# Patient Record
Sex: FEMALE | Race: WHITE | Employment: OTHER | URBAN - NONMETROPOLITAN AREA
[De-identification: names, ages, dates, MRNs, and addresses within clinical notes are randomized per-mention and may not be internally consistent; named-entity substitution may affect disease eponyms.]

---

## 2017-01-17 ENCOUNTER — TELEPHONE (OUTPATIENT)
Dept: PRIMARY CARE CLINIC | Age: 52
End: 2017-01-17

## 2017-01-19 ENCOUNTER — OFFICE VISIT (OUTPATIENT)
Dept: PRIMARY CARE CLINIC | Age: 52
End: 2017-01-19
Payer: COMMERCIAL

## 2017-01-19 VITALS
RESPIRATION RATE: 20 BRPM | HEART RATE: 99 BPM | DIASTOLIC BLOOD PRESSURE: 79 MMHG | HEIGHT: 64 IN | OXYGEN SATURATION: 98 % | SYSTOLIC BLOOD PRESSURE: 127 MMHG | WEIGHT: 161.2 LBS | BODY MASS INDEX: 27.52 KG/M2 | TEMPERATURE: 98 F

## 2017-01-19 DIAGNOSIS — R05.9 COUGH: ICD-10-CM

## 2017-01-19 DIAGNOSIS — F41.9 ANXIETY: ICD-10-CM

## 2017-01-19 DIAGNOSIS — K92.1 BLOODY STOOL: Primary | ICD-10-CM

## 2017-01-19 DIAGNOSIS — R06.2 WHEEZING: ICD-10-CM

## 2017-01-19 DIAGNOSIS — K62.5 RECTAL BLEEDING: ICD-10-CM

## 2017-01-19 DIAGNOSIS — Z12.11 SCREENING FOR COLON CANCER: Primary | ICD-10-CM

## 2017-01-19 PROCEDURE — 90471 IMMUNIZATION ADMIN: CPT | Performed by: NURSE PRACTITIONER

## 2017-01-19 PROCEDURE — 99214 OFFICE O/P EST MOD 30 MIN: CPT | Performed by: NURSE PRACTITIONER

## 2017-01-19 PROCEDURE — 90686 IIV4 VACC NO PRSV 0.5 ML IM: CPT | Performed by: NURSE PRACTITIONER

## 2017-01-19 RX ORDER — GABAPENTIN 600 MG/1
600 TABLET ORAL 3 TIMES DAILY
COMMUNITY
End: 2017-06-02 | Stop reason: SDUPTHER

## 2017-01-19 RX ORDER — PROMETHAZINE HYDROCHLORIDE 6.25 MG/5ML
6.25 SYRUP ORAL 4 TIMES DAILY PRN
Qty: 1 BOTTLE | Refills: 1 | Status: SHIPPED | OUTPATIENT
Start: 2017-01-19 | End: 2017-01-26

## 2017-01-29 ASSESSMENT — ENCOUNTER SYMPTOMS
SINUS PRESSURE: 1
CHEST TIGHTNESS: 0
BLOOD IN STOOL: 1
WHEEZING: 1
RHINORRHEA: 1
BACK PAIN: 1
APNEA: 0
COUGH: 1
DIARRHEA: 1

## 2017-01-31 ENCOUNTER — OFFICE VISIT (OUTPATIENT)
Dept: GASTROENTEROLOGY | Age: 52
End: 2017-01-31
Payer: COMMERCIAL

## 2017-01-31 VITALS
WEIGHT: 164 LBS | HEART RATE: 68 BPM | OXYGEN SATURATION: 90 % | DIASTOLIC BLOOD PRESSURE: 80 MMHG | HEIGHT: 63 IN | BODY MASS INDEX: 29.06 KG/M2 | SYSTOLIC BLOOD PRESSURE: 126 MMHG

## 2017-01-31 DIAGNOSIS — K59.09 CHRONIC CONSTIPATION: ICD-10-CM

## 2017-01-31 DIAGNOSIS — K64.8 INTERNAL HEMORRHOID, BLEEDING: ICD-10-CM

## 2017-01-31 DIAGNOSIS — K59.00 DIFFICULT BOWEL MOVEMENTS: ICD-10-CM

## 2017-01-31 DIAGNOSIS — R10.9 ABDOMINAL CRAMPING: ICD-10-CM

## 2017-01-31 DIAGNOSIS — K62.89 RECTAL PAIN: ICD-10-CM

## 2017-01-31 DIAGNOSIS — K62.5 RECTAL BLEEDING: Primary | ICD-10-CM

## 2017-01-31 PROCEDURE — 99214 OFFICE O/P EST MOD 30 MIN: CPT | Performed by: NURSE PRACTITIONER

## 2017-01-31 RX ORDER — LACTULOSE 10 G/15ML
10 SOLUTION ORAL 2 TIMES DAILY
Qty: 946 ML | Refills: 3 | Status: ON HOLD | OUTPATIENT
Start: 2017-01-31 | End: 2017-02-06 | Stop reason: HOSPADM

## 2017-01-31 ASSESSMENT — ENCOUNTER SYMPTOMS
BLOOD IN STOOL: 0
EYES NEGATIVE: 1
BACK PAIN: 0
CONSTIPATION: 1
COUGH: 0
ALLERGIC/IMMUNOLOGIC NEGATIVE: 1
NAUSEA: 0
CHEST TIGHTNESS: 0
DIARRHEA: 0
VOICE CHANGE: 0
RECTAL PAIN: 0
ANAL BLEEDING: 1
SHORTNESS OF BREATH: 0
ABDOMINAL PAIN: 1
SORE THROAT: 0
VOMITING: 0

## 2017-02-02 ENCOUNTER — ANESTHESIA EVENT (OUTPATIENT)
Dept: OPERATING ROOM | Age: 52
End: 2017-02-02

## 2017-02-06 ENCOUNTER — ANESTHESIA (OUTPATIENT)
Dept: OPERATING ROOM | Age: 52
End: 2017-02-06
Payer: COMMERCIAL

## 2017-02-06 ENCOUNTER — HOSPITAL ENCOUNTER (OUTPATIENT)
Age: 52
Setting detail: OUTPATIENT SURGERY
Discharge: HOME | End: 2017-02-06
Attending: INTERNAL MEDICINE | Admitting: INTERNAL MEDICINE
Payer: COMMERCIAL

## 2017-02-06 ENCOUNTER — SURGERY (OUTPATIENT)
Age: 52
End: 2017-02-06

## 2017-02-06 VITALS
BODY MASS INDEX: 28 KG/M2 | DIASTOLIC BLOOD PRESSURE: 73 MMHG | OXYGEN SATURATION: 95 % | SYSTOLIC BLOOD PRESSURE: 107 MMHG | TEMPERATURE: 98 F | WEIGHT: 164 LBS | RESPIRATION RATE: 16 BRPM | HEART RATE: 78 BPM | HEIGHT: 64 IN

## 2017-02-06 VITALS — SYSTOLIC BLOOD PRESSURE: 93 MMHG | OXYGEN SATURATION: 89 % | DIASTOLIC BLOOD PRESSURE: 64 MMHG

## 2017-02-06 PROCEDURE — 00810 PR ANESTH,INTESTINE,SCOPE,LOW: CPT | Performed by: NURSE ANESTHETIST, CERTIFIED REGISTERED

## 2017-02-06 PROCEDURE — 45378 DIAGNOSTIC COLONOSCOPY: CPT

## 2017-02-06 PROCEDURE — G8907 PT DOC NO EVENTS ON DISCHARG: HCPCS

## 2017-02-06 PROCEDURE — 45378 DIAGNOSTIC COLONOSCOPY: CPT | Performed by: INTERNAL MEDICINE

## 2017-02-06 PROCEDURE — G8918 PT W/O PREOP ORDER IV AB PRO: HCPCS

## 2017-02-06 RX ORDER — MEPERIDINE HYDROCHLORIDE 25 MG/ML
12.5 INJECTION INTRAMUSCULAR; INTRAVENOUS; SUBCUTANEOUS EVERY 5 MIN PRN
Status: DISCONTINUED | OUTPATIENT
Start: 2017-02-06 | End: 2017-02-06 | Stop reason: HOSPADM

## 2017-02-06 RX ORDER — HYDROMORPHONE HCL 110MG/55ML
0.25 PATIENT CONTROLLED ANALGESIA SYRINGE INTRAVENOUS EVERY 5 MIN PRN
Status: DISCONTINUED | OUTPATIENT
Start: 2017-02-06 | End: 2017-02-06 | Stop reason: HOSPADM

## 2017-02-06 RX ORDER — PROMETHAZINE HYDROCHLORIDE 25 MG/ML
12.5 INJECTION, SOLUTION INTRAMUSCULAR; INTRAVENOUS
Status: DISCONTINUED | OUTPATIENT
Start: 2017-02-06 | End: 2017-02-06 | Stop reason: HOSPADM

## 2017-02-06 RX ORDER — OXYCODONE HYDROCHLORIDE AND ACETAMINOPHEN 5; 325 MG/1; MG/1
2 TABLET ORAL PRN
Status: DISCONTINUED | OUTPATIENT
Start: 2017-02-06 | End: 2017-02-06 | Stop reason: HOSPADM

## 2017-02-06 RX ORDER — HYDRALAZINE HYDROCHLORIDE 20 MG/ML
5 INJECTION INTRAMUSCULAR; INTRAVENOUS EVERY 10 MIN PRN
Status: DISCONTINUED | OUTPATIENT
Start: 2017-02-06 | End: 2017-02-06 | Stop reason: HOSPADM

## 2017-02-06 RX ORDER — LABETALOL HYDROCHLORIDE 5 MG/ML
5 INJECTION, SOLUTION INTRAVENOUS EVERY 10 MIN PRN
Status: DISCONTINUED | OUTPATIENT
Start: 2017-02-06 | End: 2017-02-06 | Stop reason: HOSPADM

## 2017-02-06 RX ORDER — FENTANYL CITRATE 50 UG/ML
INJECTION, SOLUTION INTRAMUSCULAR; INTRAVENOUS PRN
Status: DISCONTINUED | OUTPATIENT
Start: 2017-02-06 | End: 2017-02-06 | Stop reason: SDUPTHER

## 2017-02-06 RX ORDER — OXYCODONE HYDROCHLORIDE AND ACETAMINOPHEN 5; 325 MG/1; MG/1
1 TABLET ORAL PRN
Status: DISCONTINUED | OUTPATIENT
Start: 2017-02-06 | End: 2017-02-06 | Stop reason: HOSPADM

## 2017-02-06 RX ORDER — SODIUM CHLORIDE 9 MG/ML
INJECTION, SOLUTION INTRAVENOUS CONTINUOUS
Status: DISCONTINUED | OUTPATIENT
Start: 2017-02-06 | End: 2017-02-06 | Stop reason: HOSPADM

## 2017-02-06 RX ORDER — FENTANYL CITRATE 50 UG/ML
50 INJECTION, SOLUTION INTRAMUSCULAR; INTRAVENOUS EVERY 5 MIN PRN
Status: DISCONTINUED | OUTPATIENT
Start: 2017-02-06 | End: 2017-02-06 | Stop reason: HOSPADM

## 2017-02-06 RX ORDER — PROPOFOL 10 MG/ML
INJECTION, EMULSION INTRAVENOUS PRN
Status: DISCONTINUED | OUTPATIENT
Start: 2017-02-06 | End: 2017-02-06 | Stop reason: SDUPTHER

## 2017-02-06 RX ORDER — MIDAZOLAM HYDROCHLORIDE 1 MG/ML
INJECTION INTRAMUSCULAR; INTRAVENOUS PRN
Status: DISCONTINUED | OUTPATIENT
Start: 2017-02-06 | End: 2017-02-06 | Stop reason: SDUPTHER

## 2017-02-06 RX ORDER — LIDOCAINE HYDROCHLORIDE 10 MG/ML
1 INJECTION, SOLUTION EPIDURAL; INFILTRATION; INTRACAUDAL; PERINEURAL
Status: DISCONTINUED | OUTPATIENT
Start: 2017-02-06 | End: 2017-02-06 | Stop reason: HOSPADM

## 2017-02-06 RX ORDER — LUBIPROSTONE 24 UG/1
24 CAPSULE, GELATIN COATED ORAL 2 TIMES DAILY WITH MEALS
Qty: 60 CAPSULE | Refills: 5 | Status: SHIPPED | OUTPATIENT
Start: 2017-02-06 | End: 2017-06-02 | Stop reason: SDUPTHER

## 2017-02-06 RX ORDER — DIPHENHYDRAMINE HYDROCHLORIDE 50 MG/ML
12.5 INJECTION INTRAMUSCULAR; INTRAVENOUS
Status: DISCONTINUED | OUTPATIENT
Start: 2017-02-06 | End: 2017-02-06 | Stop reason: HOSPADM

## 2017-02-06 RX ORDER — ONDANSETRON 2 MG/ML
4 INJECTION INTRAMUSCULAR; INTRAVENOUS
Status: DISCONTINUED | OUTPATIENT
Start: 2017-02-06 | End: 2017-02-06 | Stop reason: HOSPADM

## 2017-02-06 RX ORDER — SODIUM CHLORIDE, SODIUM LACTATE, POTASSIUM CHLORIDE, CALCIUM CHLORIDE 600; 310; 30; 20 MG/100ML; MG/100ML; MG/100ML; MG/100ML
INJECTION, SOLUTION INTRAVENOUS CONTINUOUS
Status: DISCONTINUED | OUTPATIENT
Start: 2017-02-06 | End: 2017-02-06 | Stop reason: HOSPADM

## 2017-02-06 RX ADMIN — FENTANYL CITRATE 50 MCG: 50 INJECTION, SOLUTION INTRAMUSCULAR; INTRAVENOUS at 08:07

## 2017-02-06 RX ADMIN — SODIUM CHLORIDE: 9 INJECTION, SOLUTION INTRAVENOUS at 07:01

## 2017-02-06 RX ADMIN — PROPOFOL 250 MG: 10 INJECTION, EMULSION INTRAVENOUS at 08:07

## 2017-02-06 RX ADMIN — MIDAZOLAM HYDROCHLORIDE 1 MG: 1 INJECTION INTRAMUSCULAR; INTRAVENOUS at 08:07

## 2017-02-06 ASSESSMENT — PAIN SCALES - GENERAL
PAINLEVEL_OUTOF10: 0
PAINLEVEL_OUTOF10: 0

## 2017-03-30 ENCOUNTER — TELEPHONE (OUTPATIENT)
Dept: PRIMARY CARE CLINIC | Age: 52
End: 2017-03-30

## 2017-04-18 RX ORDER — FAMOTIDINE 20 MG/1
20 TABLET, FILM COATED ORAL 2 TIMES DAILY
Qty: 60 TABLET | Refills: 3 | Status: SHIPPED | OUTPATIENT
Start: 2017-04-18 | End: 2017-06-02 | Stop reason: ALTCHOICE

## 2017-06-02 ENCOUNTER — HOSPITAL ENCOUNTER (OUTPATIENT)
Dept: GENERAL RADIOLOGY | Age: 52
Discharge: HOME | End: 2017-06-02
Payer: COMMERCIAL

## 2017-06-02 ENCOUNTER — OFFICE VISIT (OUTPATIENT)
Dept: PRIMARY CARE CLINIC | Age: 52
End: 2017-06-02
Payer: COMMERCIAL

## 2017-06-02 VITALS
TEMPERATURE: 97.8 F | WEIGHT: 160 LBS | HEART RATE: 110 BPM | SYSTOLIC BLOOD PRESSURE: 136 MMHG | RESPIRATION RATE: 18 BRPM | DIASTOLIC BLOOD PRESSURE: 98 MMHG | BODY MASS INDEX: 27.46 KG/M2 | OXYGEN SATURATION: 98 %

## 2017-06-02 DIAGNOSIS — F41.9 ANXIETY: ICD-10-CM

## 2017-06-02 DIAGNOSIS — K21.9 GASTROESOPHAGEAL REFLUX DISEASE WITHOUT ESOPHAGITIS: ICD-10-CM

## 2017-06-02 DIAGNOSIS — J40 BRONCHITIS: ICD-10-CM

## 2017-06-02 DIAGNOSIS — R91.1 LUNG NODULE: ICD-10-CM

## 2017-06-02 DIAGNOSIS — R91.1 LUNG NODULE: Primary | ICD-10-CM

## 2017-06-02 DIAGNOSIS — M54.2 NECK PAIN: Chronic | ICD-10-CM

## 2017-06-02 PROCEDURE — 71020 XR CHEST STANDARD TWO VW: CPT

## 2017-06-02 PROCEDURE — 99214 OFFICE O/P EST MOD 30 MIN: CPT | Performed by: NURSE PRACTITIONER

## 2017-06-02 RX ORDER — GABAPENTIN 600 MG/1
600 TABLET ORAL 3 TIMES DAILY
Qty: 90 TABLET | Refills: 2 | Status: SHIPPED | OUTPATIENT
Start: 2017-06-02 | End: 2017-11-01 | Stop reason: SDUPTHER

## 2017-06-02 RX ORDER — CITALOPRAM 40 MG/1
TABLET ORAL
Qty: 30 TABLET | Refills: 11 | Status: SHIPPED | OUTPATIENT
Start: 2017-06-02 | End: 2018-05-30 | Stop reason: SDUPTHER

## 2017-06-02 RX ORDER — AMITRIPTYLINE HYDROCHLORIDE 50 MG/1
TABLET, FILM COATED ORAL
Qty: 30 TABLET | Refills: 3 | Status: SHIPPED | OUTPATIENT
Start: 2017-06-02 | End: 2017-11-17 | Stop reason: SDUPTHER

## 2017-06-02 RX ORDER — CELECOXIB 200 MG/1
CAPSULE ORAL
Qty: 30 CAPSULE | Refills: 3 | Status: SHIPPED | OUTPATIENT
Start: 2017-06-02 | End: 2017-11-17 | Stop reason: SDUPTHER

## 2017-06-02 RX ORDER — TOPIRAMATE 50 MG/1
TABLET, FILM COATED ORAL
Qty: 60 TABLET | Refills: 3 | Status: SHIPPED | OUTPATIENT
Start: 2017-06-02 | End: 2017-06-16 | Stop reason: DRUGHIGH

## 2017-06-02 RX ORDER — OMEPRAZOLE 40 MG/1
CAPSULE, DELAYED RELEASE ORAL
Qty: 30 CAPSULE | Refills: 11 | Status: SHIPPED | OUTPATIENT
Start: 2017-06-02 | End: 2018-05-30 | Stop reason: SDUPTHER

## 2017-06-02 RX ORDER — OXYCODONE AND ACETAMINOPHEN 7.5; 325 MG/1; MG/1
1 TABLET ORAL EVERY 8 HOURS PRN
Qty: 90 TABLET | Refills: 0 | Status: CANCELLED | OUTPATIENT
Start: 2017-06-02

## 2017-06-02 RX ORDER — TIZANIDINE 4 MG/1
4 TABLET ORAL EVERY 6 HOURS PRN
COMMUNITY
End: 2018-05-30 | Stop reason: SDUPTHER

## 2017-06-02 RX ORDER — LUBIPROSTONE 24 UG/1
24 CAPSULE, GELATIN COATED ORAL 2 TIMES DAILY WITH MEALS
Qty: 60 CAPSULE | Refills: 3 | Status: SHIPPED | OUTPATIENT
Start: 2017-06-02 | End: 2017-12-13 | Stop reason: SDUPTHER

## 2017-06-02 RX ORDER — OXYCODONE AND ACETAMINOPHEN 7.5; 325 MG/1; MG/1
1 TABLET ORAL 2 TIMES DAILY PRN
COMMUNITY
Start: 2017-04-02 | End: 2018-05-30

## 2017-06-02 RX ORDER — FLUTICASONE PROPIONATE 50 MCG
1 SPRAY, SUSPENSION (ML) NASAL DAILY
Qty: 1 BOTTLE | Refills: 3 | Status: SHIPPED | OUTPATIENT
Start: 2017-06-02 | End: 2017-11-17 | Stop reason: SDUPTHER

## 2017-06-02 ASSESSMENT — ENCOUNTER SYMPTOMS
BACK PAIN: 1
EYES NEGATIVE: 1
RESPIRATORY NEGATIVE: 1
ABDOMINAL PAIN: 1

## 2017-06-16 ENCOUNTER — HOSPITAL ENCOUNTER (OUTPATIENT)
Dept: GENERAL RADIOLOGY | Age: 52
Discharge: HOME | End: 2017-06-16
Payer: COMMERCIAL

## 2017-06-16 ENCOUNTER — OFFICE VISIT (OUTPATIENT)
Dept: PRIMARY CARE CLINIC | Age: 52
End: 2017-06-16
Payer: COMMERCIAL

## 2017-06-16 VITALS
OXYGEN SATURATION: 95 % | TEMPERATURE: 97.6 F | WEIGHT: 169.4 LBS | HEART RATE: 88 BPM | SYSTOLIC BLOOD PRESSURE: 136 MMHG | BODY MASS INDEX: 29.08 KG/M2 | DIASTOLIC BLOOD PRESSURE: 84 MMHG

## 2017-06-16 DIAGNOSIS — R10.9 ABDOMINAL CRAMPING: ICD-10-CM

## 2017-06-16 DIAGNOSIS — R14.0 BLOATING: ICD-10-CM

## 2017-06-16 DIAGNOSIS — F41.9 ANXIETY: ICD-10-CM

## 2017-06-16 DIAGNOSIS — K59.00 CONSTIPATION, UNSPECIFIED CONSTIPATION TYPE: Primary | ICD-10-CM

## 2017-06-16 DIAGNOSIS — K59.00 CONSTIPATION, UNSPECIFIED CONSTIPATION TYPE: ICD-10-CM

## 2017-06-16 PROCEDURE — 74000 XR ABDOMEN LIMITED (KUB): CPT

## 2017-06-16 PROCEDURE — 99214 OFFICE O/P EST MOD 30 MIN: CPT | Performed by: NURSE PRACTITIONER

## 2017-06-16 RX ORDER — TOPIRAMATE 100 MG/1
100 TABLET, FILM COATED ORAL 2 TIMES DAILY
Qty: 60 TABLET | Refills: 3 | Status: SHIPPED | OUTPATIENT
Start: 2017-06-16 | End: 2017-07-06 | Stop reason: ALTCHOICE

## 2017-06-16 ASSESSMENT — ENCOUNTER SYMPTOMS
EYES NEGATIVE: 1
RESPIRATORY NEGATIVE: 1
ALLERGIC/IMMUNOLOGIC NEGATIVE: 1
CONSTIPATION: 1

## 2017-06-26 ENCOUNTER — OFFICE VISIT (OUTPATIENT)
Dept: GASTROENTEROLOGY | Age: 52
End: 2017-06-26
Payer: COMMERCIAL

## 2017-06-26 VITALS
WEIGHT: 169 LBS | BODY MASS INDEX: 29.95 KG/M2 | HEIGHT: 63 IN | OXYGEN SATURATION: 95 % | HEART RATE: 78 BPM | DIASTOLIC BLOOD PRESSURE: 82 MMHG | SYSTOLIC BLOOD PRESSURE: 126 MMHG

## 2017-06-26 DIAGNOSIS — K59.00 DIFFICULT BOWEL MOVEMENTS: ICD-10-CM

## 2017-06-26 DIAGNOSIS — K59.09 CHRONIC CONSTIPATION: Primary | ICD-10-CM

## 2017-06-26 DIAGNOSIS — K64.8 INTERNAL HEMORRHOID, BLEEDING: ICD-10-CM

## 2017-06-26 PROCEDURE — 99214 OFFICE O/P EST MOD 30 MIN: CPT | Performed by: NURSE PRACTITIONER

## 2017-06-26 ASSESSMENT — ENCOUNTER SYMPTOMS
EYES NEGATIVE: 1
VOICE CHANGE: 0
DIARRHEA: 0
CHEST TIGHTNESS: 0
CONSTIPATION: 1
SHORTNESS OF BREATH: 0
ALLERGIC/IMMUNOLOGIC NEGATIVE: 1
BACK PAIN: 0
SORE THROAT: 0
VOMITING: 0
COUGH: 0
NAUSEA: 0
RECTAL PAIN: 0
BLOOD IN STOOL: 0
ANAL BLEEDING: 1
ABDOMINAL PAIN: 1

## 2017-07-06 ENCOUNTER — OFFICE VISIT (OUTPATIENT)
Dept: PRIMARY CARE CLINIC | Age: 52
End: 2017-07-06
Payer: COMMERCIAL

## 2017-07-06 VITALS
WEIGHT: 163.6 LBS | TEMPERATURE: 98 F | BODY MASS INDEX: 27.26 KG/M2 | DIASTOLIC BLOOD PRESSURE: 86 MMHG | HEIGHT: 65 IN | SYSTOLIC BLOOD PRESSURE: 124 MMHG

## 2017-07-06 DIAGNOSIS — G43.809 OTHER TYPE OF MIGRAINE: Primary | ICD-10-CM

## 2017-07-06 DIAGNOSIS — M54.2 NECK PAIN: Chronic | ICD-10-CM

## 2017-07-06 DIAGNOSIS — R11.0 NAUSEA: ICD-10-CM

## 2017-07-06 PROCEDURE — 99214 OFFICE O/P EST MOD 30 MIN: CPT | Performed by: NURSE PRACTITIONER

## 2017-07-06 PROCEDURE — 96372 THER/PROPH/DIAG INJ SC/IM: CPT | Performed by: NURSE PRACTITIONER

## 2017-07-06 RX ORDER — PROMETHAZINE HYDROCHLORIDE 25 MG/ML
25 INJECTION, SOLUTION INTRAMUSCULAR; INTRAVENOUS ONCE
Status: COMPLETED | OUTPATIENT
Start: 2017-07-06 | End: 2017-07-06

## 2017-07-06 RX ORDER — SUMATRIPTAN 6 MG/.5ML
6 INJECTION, SOLUTION SUBCUTANEOUS ONCE
Status: COMPLETED | OUTPATIENT
Start: 2017-07-06 | End: 2017-07-06

## 2017-07-06 RX ORDER — SUMATRIPTAN 6 MG/.5ML
6 INJECTION, SOLUTION SUBCUTANEOUS
Qty: 0.5 ML | Refills: 0 | Status: SHIPPED | OUTPATIENT
Start: 2017-07-06 | End: 2017-11-30 | Stop reason: CLARIF

## 2017-07-06 RX ORDER — PROPRANOLOL HYDROCHLORIDE 10 MG/1
10 TABLET ORAL 3 TIMES DAILY
Qty: 90 TABLET | Refills: 3 | Status: SHIPPED | OUTPATIENT
Start: 2017-07-06 | End: 2018-01-10 | Stop reason: SDUPTHER

## 2017-07-06 RX ADMIN — SUMATRIPTAN 6 MG: 6 INJECTION, SOLUTION SUBCUTANEOUS at 16:14

## 2017-07-06 RX ADMIN — PROMETHAZINE HYDROCHLORIDE 25 MG: 25 INJECTION, SOLUTION INTRAMUSCULAR; INTRAVENOUS at 13:54

## 2017-07-06 ASSESSMENT — ENCOUNTER SYMPTOMS
NAUSEA: 1
EYES NEGATIVE: 1
RESPIRATORY NEGATIVE: 1

## 2017-07-07 ENCOUNTER — TELEPHONE (OUTPATIENT)
Dept: GASTROENTEROLOGY | Age: 52
End: 2017-07-07

## 2017-09-19 ENCOUNTER — TELEPHONE (OUTPATIENT)
Dept: PRIMARY CARE CLINIC | Age: 52
End: 2017-09-19

## 2017-11-01 ENCOUNTER — TELEPHONE (OUTPATIENT)
Dept: PRIMARY CARE CLINIC | Age: 52
End: 2017-11-01

## 2017-11-01 DIAGNOSIS — F41.9 ANXIETY: ICD-10-CM

## 2017-11-01 RX ORDER — GABAPENTIN 600 MG/1
600 TABLET ORAL 3 TIMES DAILY
Qty: 90 TABLET | Refills: 1 | OUTPATIENT
Start: 2017-11-01 | End: 2017-11-30 | Stop reason: SDUPTHER

## 2017-11-10 ENCOUNTER — OFFICE VISIT (OUTPATIENT)
Dept: PRIMARY CARE CLINIC | Age: 52
End: 2017-11-10
Payer: COMMERCIAL

## 2017-11-10 VITALS
RESPIRATION RATE: 18 BRPM | TEMPERATURE: 97.8 F | HEIGHT: 65 IN | BODY MASS INDEX: 26.92 KG/M2 | WEIGHT: 161.6 LBS | DIASTOLIC BLOOD PRESSURE: 80 MMHG | OXYGEN SATURATION: 96 % | HEART RATE: 97 BPM | SYSTOLIC BLOOD PRESSURE: 112 MMHG

## 2017-11-10 DIAGNOSIS — Z23 NEEDS FLU SHOT: ICD-10-CM

## 2017-11-10 DIAGNOSIS — G43.809 OTHER MIGRAINE WITHOUT STATUS MIGRAINOSUS, NOT INTRACTABLE: ICD-10-CM

## 2017-11-10 DIAGNOSIS — E89.41 HOT FLASHES DUE TO SURGICAL MENOPAUSE: Primary | ICD-10-CM

## 2017-11-10 DIAGNOSIS — R25.2 SPASM: ICD-10-CM

## 2017-11-10 LAB
ALBUMIN SERPL-MCNC: 3.7 G/DL (ref 3.5–5.2)
ALP BLD-CCNC: 73 U/L (ref 35–104)
ALT SERPL-CCNC: 9 U/L (ref 5–33)
ANION GAP SERPL CALCULATED.3IONS-SCNC: 11 MMOL/L (ref 7–19)
AST SERPL-CCNC: 19 U/L (ref 5–32)
BILIRUB SERPL-MCNC: <0.2 MG/DL (ref 0.2–1.2)
BUN BLDV-MCNC: 14 MG/DL (ref 6–20)
CALCIUM SERPL-MCNC: 8.9 MG/DL (ref 8.6–10)
CHLORIDE BLD-SCNC: 103 MMOL/L (ref 98–111)
CO2: 27 MMOL/L (ref 22–29)
CREAT SERPL-MCNC: 0.8 MG/DL (ref 0.5–0.9)
GFR NON-AFRICAN AMERICAN: >60
GLUCOSE BLD-MCNC: 80 MG/DL (ref 74–109)
HCT VFR BLD CALC: 42.2 % (ref 37–47)
HEMOGLOBIN: 13.7 G/DL (ref 12–16)
MAGNESIUM: 2 MG/DL (ref 1.6–2.6)
MCH RBC QN AUTO: 32.5 PG (ref 27–31)
MCHC RBC AUTO-ENTMCNC: 32.5 G/DL (ref 33–37)
MCV RBC AUTO: 100 FL (ref 81–99)
PDW BLD-RTO: 11.8 % (ref 11.5–14.5)
PLATELET # BLD: 254 K/UL (ref 130–400)
PMV BLD AUTO: 9 FL (ref 9.4–12.3)
POTASSIUM SERPL-SCNC: 4.2 MMOL/L (ref 3.5–5)
RBC # BLD: 4.22 M/UL (ref 4.2–5.4)
SODIUM BLD-SCNC: 141 MMOL/L (ref 136–145)
TOTAL PROTEIN: 6.7 G/DL (ref 6.6–8.7)
WBC # BLD: 6.1 K/UL (ref 4.8–10.8)

## 2017-11-10 PROCEDURE — 99214 OFFICE O/P EST MOD 30 MIN: CPT | Performed by: NURSE PRACTITIONER

## 2017-11-10 PROCEDURE — 4004F PT TOBACCO SCREEN RCVD TLK: CPT | Performed by: NURSE PRACTITIONER

## 2017-11-10 PROCEDURE — 3014F SCREEN MAMMO DOC REV: CPT | Performed by: NURSE PRACTITIONER

## 2017-11-10 PROCEDURE — 90471 IMMUNIZATION ADMIN: CPT | Performed by: NURSE PRACTITIONER

## 2017-11-10 PROCEDURE — 3017F COLORECTAL CA SCREEN DOC REV: CPT | Performed by: NURSE PRACTITIONER

## 2017-11-10 PROCEDURE — 90688 IIV4 VACCINE SPLT 0.5 ML IM: CPT | Performed by: NURSE PRACTITIONER

## 2017-11-10 PROCEDURE — G8427 DOCREV CUR MEDS BY ELIG CLIN: HCPCS | Performed by: NURSE PRACTITIONER

## 2017-11-10 PROCEDURE — G8484 FLU IMMUNIZE NO ADMIN: HCPCS | Performed by: NURSE PRACTITIONER

## 2017-11-10 PROCEDURE — G8417 CALC BMI ABV UP PARAM F/U: HCPCS | Performed by: NURSE PRACTITIONER

## 2017-11-10 RX ORDER — SUMATRIPTAN 6 MG/.5ML
6 INJECTION, SOLUTION SUBCUTANEOUS
Qty: 0.5 ML | Refills: 0 | Status: CANCELLED | OUTPATIENT
Start: 2017-11-10 | End: 2017-11-10

## 2017-11-10 RX ORDER — TOPIRAMATE 50 MG/1
50 TABLET, FILM COATED ORAL 2 TIMES DAILY
Qty: 60 TABLET | Refills: 3 | Status: SHIPPED | OUTPATIENT
Start: 2017-11-10 | End: 2018-03-08 | Stop reason: SDUPTHER

## 2017-11-10 ASSESSMENT — ENCOUNTER SYMPTOMS
GASTROINTESTINAL NEGATIVE: 1
RESPIRATORY NEGATIVE: 1
PHOTOPHOBIA: 1

## 2017-11-10 NOTE — PROGRESS NOTES
propranolol (INDERAL) 10 MG tablet Take 1 tablet by mouth 3 times daily 90 tablet 3    SUMAtriptan Succinate 6 MG/0.5ML SOSY injection Inject 0.5 mLs into the skin once as needed for Migraine 0.5 mL 0    oxyCODONE-acetaminophen (PERCOCET) 7.5-325 MG per tablet Take 1 tablet by mouth 2 times daily as needed  .  tiZANidine (ZANAFLEX) 4 MG tablet Take 4 mg by mouth every 6 hours as needed      citalopram (CELEXA) 40 MG tablet TAKE ONE TABLET BY MOUTH ONCE DAILY 30 tablet 11    amitriptyline (ELAVIL) 50 MG tablet TAKE ONE TABLET BY MOUTH NIGHTLY 30 tablet 3    celecoxib (CELEBREX) 200 MG capsule TAKE ONE CAPSULE BY MOUTH ONCE DAILY 30 capsule 3    fluticasone (FLONASE) 50 MCG/ACT nasal spray 1 spray by Nasal route daily 1 Bottle 3    lubiprostone (AMITIZA) 24 MCG capsule Take 1 capsule by mouth 2 times daily (with meals) 60 capsule 3    omeprazole (PRILOSEC) 40 MG delayed release capsule TAKE ONE CAPSULE BY MOUTH ONCE DAILY 30 capsule 11    oxaprozin (DAYPRO) 600 MG tablet Take 1 tablet by mouth daily 30 tablet 11     No current facility-administered medications for this visit. Allergies   Allergen Reactions    Ventolin [Albuterol] Swelling       Family History   Problem Relation Age of Onset    Colon Cancer Maternal Aunt     Colon Polyps Maternal Aunt     Liver Cancer Neg Hx     Liver Disease Neg Hx     Rectal Cancer Neg Hx     Stomach Cancer Neg Hx          Subjective:      Review of Systems   Constitutional: Negative. HENT: Negative. Eyes: Positive for photophobia. Respiratory: Negative. Cardiovascular: Negative. Gastrointestinal: Negative. Endocrine: Negative. Genitourinary:        Hot flashes and night sweats   Musculoskeletal: Negative. Skin: Negative. Neurological: Positive for headaches. Hematological: Negative. Psychiatric/Behavioral: Negative. Objective:     Physical Exam   Constitutional: She is oriented to person, place, and time.  Vital signs at 9:18 PM

## 2017-11-16 ENCOUNTER — TELEPHONE (OUTPATIENT)
Dept: PRIMARY CARE CLINIC | Age: 52
End: 2017-11-16

## 2017-11-17 RX ORDER — AMITRIPTYLINE HYDROCHLORIDE 50 MG/1
TABLET, FILM COATED ORAL
Qty: 30 TABLET | Refills: 3 | Status: SHIPPED | OUTPATIENT
Start: 2017-11-17 | End: 2018-03-08 | Stop reason: SDUPTHER

## 2017-11-17 RX ORDER — FLUTICASONE PROPIONATE 50 MCG
SPRAY, SUSPENSION (ML) NASAL
Qty: 1 BOTTLE | Refills: 3 | Status: SHIPPED | OUTPATIENT
Start: 2017-11-17 | End: 2018-04-06 | Stop reason: SDUPTHER

## 2017-11-17 RX ORDER — CELECOXIB 200 MG/1
CAPSULE ORAL
Qty: 30 CAPSULE | Refills: 3 | Status: SHIPPED | OUTPATIENT
Start: 2017-11-17 | End: 2018-03-08 | Stop reason: SDUPTHER

## 2017-11-30 ENCOUNTER — OFFICE VISIT (OUTPATIENT)
Dept: PRIMARY CARE CLINIC | Age: 52
End: 2017-11-30
Payer: COMMERCIAL

## 2017-11-30 VITALS
OXYGEN SATURATION: 98 % | DIASTOLIC BLOOD PRESSURE: 86 MMHG | WEIGHT: 160 LBS | HEIGHT: 65 IN | HEART RATE: 84 BPM | TEMPERATURE: 97.9 F | SYSTOLIC BLOOD PRESSURE: 122 MMHG | BODY MASS INDEX: 26.66 KG/M2

## 2017-11-30 DIAGNOSIS — Z79.899 DRUG THERAPY: ICD-10-CM

## 2017-11-30 DIAGNOSIS — J34.89 LESION OF NOSE: ICD-10-CM

## 2017-11-30 DIAGNOSIS — G43.809 OTHER MIGRAINE WITHOUT STATUS MIGRAINOSUS, NOT INTRACTABLE: Primary | ICD-10-CM

## 2017-11-30 DIAGNOSIS — Z71.6 TOBACCO ABUSE COUNSELING: ICD-10-CM

## 2017-11-30 DIAGNOSIS — F41.9 ANXIETY: ICD-10-CM

## 2017-11-30 DIAGNOSIS — Z72.0 TOBACCO ABUSE: ICD-10-CM

## 2017-11-30 LAB
AMPHETAMINE SCREEN, URINE: NORMAL
BARBITURATE SCREEN, URINE: NORMAL
BENZODIAZEPINE SCREEN, URINE: NORMAL
COCAINE METABOLITE SCREEN URINE: NORMAL
MDMA URINE: NORMAL
METHADONE SCREEN, URINE: NORMAL
METHAMPHETAMINE, URINE: NORMAL
OPIATE SCREEN URINE: NORMAL
OXYCODONE SCREEN URINE: NORMAL
PHENCYCLIDINE SCREEN URINE: NORMAL
PROPOXYPHENE SCREEN, URINE: NORMAL
THC: NORMAL
TRICYCLIC ANTIDEPRESSANTS, UR: NORMAL

## 2017-11-30 PROCEDURE — 3017F COLORECTAL CA SCREEN DOC REV: CPT | Performed by: NURSE PRACTITIONER

## 2017-11-30 PROCEDURE — 99406 BEHAV CHNG SMOKING 3-10 MIN: CPT | Performed by: NURSE PRACTITIONER

## 2017-11-30 PROCEDURE — 3014F SCREEN MAMMO DOC REV: CPT | Performed by: NURSE PRACTITIONER

## 2017-11-30 PROCEDURE — G8427 DOCREV CUR MEDS BY ELIG CLIN: HCPCS | Performed by: NURSE PRACTITIONER

## 2017-11-30 PROCEDURE — G8484 FLU IMMUNIZE NO ADMIN: HCPCS | Performed by: NURSE PRACTITIONER

## 2017-11-30 PROCEDURE — 99214 OFFICE O/P EST MOD 30 MIN: CPT | Performed by: NURSE PRACTITIONER

## 2017-11-30 PROCEDURE — G8417 CALC BMI ABV UP PARAM F/U: HCPCS | Performed by: NURSE PRACTITIONER

## 2017-11-30 PROCEDURE — 4004F PT TOBACCO SCREEN RCVD TLK: CPT | Performed by: NURSE PRACTITIONER

## 2017-11-30 PROCEDURE — 80305 DRUG TEST PRSMV DIR OPT OBS: CPT | Performed by: NURSE PRACTITIONER

## 2017-11-30 RX ORDER — GABAPENTIN 600 MG/1
600 TABLET ORAL 3 TIMES DAILY
Qty: 90 TABLET | Refills: 1 | Status: SHIPPED | OUTPATIENT
Start: 2017-11-30 | End: 2018-03-12 | Stop reason: SDUPTHER

## 2017-11-30 RX ORDER — SUMATRIPTAN 25 MG/1
25 TABLET, FILM COATED ORAL
Qty: 15 TABLET | Refills: 0 | Status: SHIPPED | OUTPATIENT
Start: 2017-11-30 | End: 2018-03-12 | Stop reason: SDUPTHER

## 2017-11-30 NOTE — PROGRESS NOTES
SUMAtriptan (IMITREX) 25 MG tablet Take 1 tablet by mouth once as needed for Migraine 15 tablet 0    mupirocin (BACTROBAN) 2 % ointment Apply 3 times daily. 15 g 1    amitriptyline (ELAVIL) 50 MG tablet TAKE ONE TABLET BY MOUTH ONCE NIGHTLY 30 tablet 3    fluticasone (FLONASE) 50 MCG/ACT nasal spray USE ONE SPRAY(S) IN EACH NOSTRIL ONCE DAILY 1 Bottle 3    celecoxib (CELEBREX) 200 MG capsule TAKE ONE CAPSULE BY MOUTH ONCE DAILY 30 capsule 3    topiramate (TOPAMAX) 50 MG tablet Take 1 tablet by mouth 2 times daily 60 tablet 3    propranolol (INDERAL) 10 MG tablet Take 1 tablet by mouth 3 times daily 90 tablet 3    oxyCODONE-acetaminophen (PERCOCET) 7.5-325 MG per tablet Take 1 tablet by mouth 2 times daily as needed  .  tiZANidine (ZANAFLEX) 4 MG tablet Take 4 mg by mouth every 6 hours as needed      citalopram (CELEXA) 40 MG tablet TAKE ONE TABLET BY MOUTH ONCE DAILY 30 tablet 11    omeprazole (PRILOSEC) 40 MG delayed release capsule TAKE ONE CAPSULE BY MOUTH ONCE DAILY 30 capsule 11    oxaprozin (DAYPRO) 600 MG tablet Take 1 tablet by mouth daily 30 tablet 11    lubiprostone (AMITIZA) 24 MCG capsule Take 1 capsule by mouth 2 times daily (with meals) 60 capsule 3     No current facility-administered medications for this visit. Allergies   Allergen Reactions    Ventolin [Albuterol] Swelling       Family History   Problem Relation Age of Onset    Colon Cancer Maternal Aunt     Colon Polyps Maternal Aunt     Liver Cancer Neg Hx     Liver Disease Neg Hx     Rectal Cancer Neg Hx     Stomach Cancer Neg Hx          Subjective:      Review of Systems   Constitutional: Negative. HENT: Negative for congestion, mouth sores, sinus pain, sinus pressure and sore throat. Sore in nares   Eyes: Negative. Respiratory: Negative. Cardiovascular: Negative. Gastrointestinal: Negative. Endocrine: Negative. Genitourinary: Negative. Musculoskeletal: Negative. Skin: Negative. Neurological: Positive for headaches. Negative for dizziness and weakness. Hematological: Negative. Psychiatric/Behavioral: Negative. Objective:     Physical Exam   Constitutional: She is oriented to person, place, and time. Vital signs are normal. She appears well-developed and well-nourished. HENT:   Head: Normocephalic and atraumatic. Right Ear: Hearing, tympanic membrane, external ear and ear canal normal.   Left Ear: Hearing, tympanic membrane, external ear and ear canal normal.   Nose: Nose normal.       Mouth/Throat: Uvula is midline, oropharynx is clear and moist and mucous membranes are normal.   Eyes: Conjunctivae, EOM and lids are normal. Pupils are equal, round, and reactive to light. Neck: Trachea normal and normal range of motion. Neck supple. No thyroid mass and no thyromegaly present. Cardiovascular: Normal rate, regular rhythm, normal heart sounds and normal pulses. Pulmonary/Chest: Effort normal and breath sounds normal.   Abdominal: Soft. Normal appearance and bowel sounds are normal.   Musculoskeletal: Normal range of motion. Cervical back: Normal. She exhibits normal range of motion and no tenderness. Thoracic back: Normal. She exhibits normal range of motion and no tenderness. Lumbar back: Normal. She exhibits normal range of motion and no tenderness. Neurological: She is alert and oriented to person, place, and time. She has normal strength. Skin: Skin is warm, dry and intact. Psychiatric: She has a normal mood and affect. Her speech is normal and behavior is normal. Judgment and thought content normal. Cognition and memory are normal.   Nursing note and vitals reviewed. /86   Pulse 84   Temp 97.9 °F (36.6 °C)   Ht 5' 5\" (1.651 m)   Wt 160 lb (72.6 kg)   SpO2 98%   BMI 26.63 kg/m²     Assessment:     1.  Other migraine without status migrainosus, not intractable  SUMAtriptan (IMITREX) 25 MG tablet    [facility] Neurology - Horacio Shock, JERILYN Blount   2. Anxiety  gabapentin (NEURONTIN) 600 MG tablet   3. Drug therapy  POCT Rapid Drug Screen   4. Lesion of nose  mupirocin (BACTROBAN) 2 % ointment   5. Tobacco abuse     6. Tobacco abuse counseling         Results for orders placed or performed in visit on 11/30/17   POCT Rapid Drug Screen   Result Value Ref Range    Amphetamine Screen, Urine -     Barbiturate Screen, Urine -     Benzodiazepine Screen, Urine +     COCAINE METABOLITE SCREEN URINE -     THC -     MDMA URINE -     Methadone Screen, Urine -     Opiate Scrn, Ur -     Oxycodone Screen, Ur -     PCP Scrn, Ur -     Propoxyphene Screen, Urine -     Tricyclic Antidepressants, Ur -     Methamphetamine, Urine -        Plan:     Patient was positive for benzo per UDS, but is currently on oxaprozin which can cause false positive. I am referring to neuro due to increased migraines. I am prescribing oral imitrex to take as directed. Bactroban to be applied to nares. Approximately 5 minutes of education was provided about quit smoking and the harms of tobacco.  Patient does show understanding. Patient does not have  the desire to quit smoking in the future. No Follow-up on file. Orders Placed This Encounter   Procedures   [facility] Neurology - JERILYN Eastman     Referral Priority:   Routine     Referral Type:   Consult for Advice and Opinion     Referral Reason:   Specialty Services Required     Referred to Provider:   Shai Serra RN     Requested Specialty:   Neurology     Number of Visits Requested:   1    POCT Rapid Drug Screen       Orders Placed This Encounter   Medications    gabapentin (NEURONTIN) 600 MG tablet     Sig: Take 1 tablet by mouth 3 times daily     Dispense:  90 tablet     Refill:  1    SUMAtriptan (IMITREX) 25 MG tablet     Sig: Take 1 tablet by mouth once as needed for Migraine     Dispense:  15 tablet     Refill:  0    mupirocin (BACTROBAN) 2 % ointment     Sig: Apply 3 times daily. Dispense:  15 g     Refill:  1        Patient given educational materials - see patient instructions. Discussed use, benefit, and side effects of prescribed medications. All patient questions answered. Pt voiced understanding. Reviewed health maintenance. Instructed to continue current medications, diet and exercise. Patient agreed with treatment plan. Follow up as directed.            Electronically signed by JERILYN Gutierrez on 12/1/2017 at 3:36 PM

## 2017-12-01 ASSESSMENT — ENCOUNTER SYMPTOMS
SINUS PRESSURE: 0
SORE THROAT: 0
EYES NEGATIVE: 1
GASTROINTESTINAL NEGATIVE: 1
SINUS PAIN: 0
RESPIRATORY NEGATIVE: 1

## 2017-12-04 ENCOUNTER — TELEPHONE (OUTPATIENT)
Dept: NEUROLOGY | Age: 52
End: 2017-12-04

## 2018-01-04 ENCOUNTER — CARE COORDINATOR VISIT (OUTPATIENT)
Dept: CARE COORDINATION | Age: 53
End: 2018-01-04

## 2018-01-04 ENCOUNTER — OFFICE VISIT (OUTPATIENT)
Dept: PRIMARY CARE CLINIC | Age: 53
End: 2018-01-04
Payer: COMMERCIAL

## 2018-01-04 VITALS
HEIGHT: 65 IN | BODY MASS INDEX: 26.02 KG/M2 | SYSTOLIC BLOOD PRESSURE: 124 MMHG | TEMPERATURE: 97.7 F | WEIGHT: 156.2 LBS | HEART RATE: 100 BPM | OXYGEN SATURATION: 97 % | DIASTOLIC BLOOD PRESSURE: 82 MMHG

## 2018-01-04 DIAGNOSIS — M54.12 CERVICAL RADICULOPATHY: ICD-10-CM

## 2018-01-04 DIAGNOSIS — N89.8 VAGINAL DISCHARGE: ICD-10-CM

## 2018-01-04 DIAGNOSIS — M50.90 CERVICAL DISC DISEASE: ICD-10-CM

## 2018-01-04 DIAGNOSIS — M54.2 NECK PAIN: Primary | ICD-10-CM

## 2018-01-04 PROCEDURE — 3014F SCREEN MAMMO DOC REV: CPT | Performed by: FAMILY MEDICINE

## 2018-01-04 PROCEDURE — 4004F PT TOBACCO SCREEN RCVD TLK: CPT | Performed by: FAMILY MEDICINE

## 2018-01-04 PROCEDURE — 3017F COLORECTAL CA SCREEN DOC REV: CPT | Performed by: FAMILY MEDICINE

## 2018-01-04 PROCEDURE — G8417 CALC BMI ABV UP PARAM F/U: HCPCS | Performed by: FAMILY MEDICINE

## 2018-01-04 PROCEDURE — 99213 OFFICE O/P EST LOW 20 MIN: CPT | Performed by: FAMILY MEDICINE

## 2018-01-04 PROCEDURE — G8427 DOCREV CUR MEDS BY ELIG CLIN: HCPCS | Performed by: FAMILY MEDICINE

## 2018-01-04 PROCEDURE — G8484 FLU IMMUNIZE NO ADMIN: HCPCS | Performed by: FAMILY MEDICINE

## 2018-01-04 RX ORDER — METRONIDAZOLE 500 MG/1
500 TABLET ORAL 3 TIMES DAILY
Qty: 30 TABLET | Refills: 0 | Status: SHIPPED | OUTPATIENT
Start: 2018-01-04 | End: 2018-01-09

## 2018-01-04 ASSESSMENT — ENCOUNTER SYMPTOMS
COUGH: 0
WHEEZING: 0
SHORTNESS OF BREATH: 0

## 2018-01-04 NOTE — PROGRESS NOTES
Gissell Armas is a 47 y.o. female    Chief Complaint   Patient presents with   [facility] Check-Up     work release    Vaginal Discharge     Gissell Armas presents to the office for follow up of chronic neck pain. Patient states she is unable to perform her duties at work related to her worsening neck pain. Patient states pain has been present for greater than 1 year. She has underwent cervical spine surgery and has limitations on what she can lift. Patient is having difficulty with getting assistance and is in the process of filing for disability. Vaginal Discharge   The patient's primary symptoms include genital itching and vaginal discharge. This is a new problem. The current episode started in the past 7 days. The problem occurs constantly. The problem has been gradually worsening. The pain is moderate. She is not pregnant. Pertinent negatives include no chills, flank pain, frequency, hematuria or rash. The vaginal discharge was white, thick and malodorous. There has been no bleeding. She has not been passing clots. She has not been passing tissue. She has tried nothing for the symptoms. The treatment provided no relief. She is not sexually active. Review of Systems   Constitutional: Negative for chills. Respiratory: Negative for cough, shortness of breath and wheezing. Cardiovascular: Negative for chest pain. Genitourinary: Positive for vaginal discharge. Negative for flank pain, frequency and hematuria. Musculoskeletal: Negative for myalgias. Skin: Negative for rash. Prior to Admission medications    Medication Sig Start Date End Date Taking?  Authorizing Provider   AMITIZA 24 MCG capsule TAKE ONE CAPSULE BY MOUTH TWICE DAILY WITH MEALS 12/13/17  Yes JERILYN Tabor   gabapentin (NEURONTIN) 600 MG tablet Take 1 tablet by mouth 3 times daily 11/30/17  Yes JERILYN Tabor   SUMAtriptan (IMITREX) 25 MG tablet Take 1 tablet by mouth once as needed for Migraine 11/30/17 1/4/18 Yes Charls Roche

## 2018-01-04 NOTE — CARE COORDINATION
ASKED BY DR DUARTE TO MEET WITH THE PATIENT TO DISCUSS PAPERWORK SHE RECEIVED FROM [facility]. PATIENT CAME TO MY OFFICE. PAPERWORK REVIEWED. EXPLAINED THE Count includes the [facility]'S REQUEST FOR PATIENT TO RETAIN HER STATE SERVICES - SNAP & MEDICAID. DR DUARTE COMPLETED THE FORM STATING THE PATIENT IS TEMPORARILY DISABLED. PAPERWORK SCANNED IN THE PATIENT'S CHART AND FAXED TO THE Count includes the [facility].     Submitted by Smitha/RUKHSANA

## 2018-01-09 RX ORDER — FLUCONAZOLE 100 MG/1
100 TABLET ORAL DAILY
Qty: 2 TABLET | Refills: 0 | Status: SHIPPED | OUTPATIENT
Start: 2018-01-09 | End: 2018-01-11

## 2018-01-09 RX ORDER — METRONIDAZOLE 500 MG/1
500 TABLET ORAL 2 TIMES DAILY
Qty: 14 TABLET | Refills: 0 | Status: SHIPPED | OUTPATIENT
Start: 2018-01-09 | End: 2018-01-16

## 2018-01-09 RX ORDER — DOXYCYCLINE HYCLATE 100 MG
100 TABLET ORAL 2 TIMES DAILY
Qty: 20 TABLET | Refills: 0 | Status: SHIPPED | OUTPATIENT
Start: 2018-01-09 | End: 2018-01-19

## 2018-01-09 RX ORDER — AZITHROMYCIN 250 MG/1
TABLET, FILM COATED ORAL
Qty: 1 PACKET | Refills: 0 | Status: SHIPPED | OUTPATIENT
Start: 2018-01-09 | End: 2018-01-19

## 2018-01-10 RX ORDER — PROPRANOLOL HYDROCHLORIDE 10 MG/1
TABLET ORAL
Qty: 90 TABLET | Refills: 3 | Status: SHIPPED | OUTPATIENT
Start: 2018-01-10 | End: 2018-05-04 | Stop reason: SDUPTHER

## 2018-01-16 ENCOUNTER — TELEPHONE (OUTPATIENT)
Dept: NEUROLOGY | Age: 53
End: 2018-01-16

## 2018-02-06 ENCOUNTER — HOSPITAL ENCOUNTER (EMERGENCY)
Age: 53
Discharge: HOME | End: 2018-02-06
Attending: EMERGENCY MEDICINE
Payer: COMMERCIAL

## 2018-02-06 VITALS
SYSTOLIC BLOOD PRESSURE: 108 MMHG | OXYGEN SATURATION: 95 % | TEMPERATURE: 98.4 F | DIASTOLIC BLOOD PRESSURE: 82 MMHG | BODY MASS INDEX: 24.11 KG/M2 | HEIGHT: 66 IN | RESPIRATION RATE: 18 BRPM | WEIGHT: 150 LBS | HEART RATE: 84 BPM

## 2018-02-06 DIAGNOSIS — K52.9 GASTROENTERITIS: Primary | ICD-10-CM

## 2018-02-06 LAB
ALBUMIN SERPL-MCNC: 4 G/DL (ref 3.5–5.2)
ALP BLD-CCNC: 62 U/L (ref 35–104)
ALT SERPL-CCNC: 8 U/L (ref 5–33)
ANION GAP SERPL CALCULATED.3IONS-SCNC: 15 MMOL/L (ref 7–19)
AST SERPL-CCNC: 12 U/L (ref 5–32)
BACTERIA: NEGATIVE /HPF
BASOPHILS ABSOLUTE: 0 K/UL (ref 0–0.2)
BASOPHILS RELATIVE PERCENT: 0.4 % (ref 0–1)
BILIRUB SERPL-MCNC: <0.2 MG/DL (ref 0.2–1.2)
BILIRUBIN URINE: NEGATIVE
BLOOD, URINE: NEGATIVE
BUN BLDV-MCNC: 11 MG/DL (ref 6–20)
CALCIUM SERPL-MCNC: 9.4 MG/DL (ref 8.6–10)
CHLORIDE BLD-SCNC: 103 MMOL/L (ref 98–111)
CLARITY: ABNORMAL
CO2: 21 MMOL/L (ref 22–29)
COLOR: YELLOW
CREAT SERPL-MCNC: 0.7 MG/DL (ref 0.5–0.9)
EOSINOPHILS ABSOLUTE: 0.1 K/UL (ref 0–0.6)
EOSINOPHILS RELATIVE PERCENT: 1.8 % (ref 0–5)
EPITHELIAL CELLS, UA: 13 /HPF (ref 0–5)
GFR NON-AFRICAN AMERICAN: >60
GLUCOSE BLD-MCNC: 92 MG/DL (ref 74–109)
GLUCOSE URINE: NEGATIVE MG/DL
HCT VFR BLD CALC: 42.6 % (ref 37–47)
HEMOGLOBIN: 14.2 G/DL (ref 12–16)
HYALINE CASTS: 2 /HPF (ref 0–8)
KETONES, URINE: ABNORMAL MG/DL
LEUKOCYTE ESTERASE, URINE: ABNORMAL
LIPASE: 16 U/L (ref 13–60)
LYMPHOCYTES ABSOLUTE: 2.2 K/UL (ref 1.1–4.5)
LYMPHOCYTES RELATIVE PERCENT: 39.6 % (ref 20–40)
MCH RBC QN AUTO: 32.6 PG (ref 27–31)
MCHC RBC AUTO-ENTMCNC: 33.3 G/DL (ref 33–37)
MCV RBC AUTO: 97.9 FL (ref 81–99)
MONOCYTES ABSOLUTE: 0.4 K/UL (ref 0–0.9)
MONOCYTES RELATIVE PERCENT: 6.7 % (ref 0–10)
NEUTROPHILS ABSOLUTE: 2.8 K/UL (ref 1.5–7.5)
NEUTROPHILS RELATIVE PERCENT: 51.3 % (ref 50–65)
NITRITE, URINE: NEGATIVE
PDW BLD-RTO: 13 % (ref 11.5–14.5)
PH UA: 7.5
PLATELET # BLD: 269 K/UL (ref 130–400)
PMV BLD AUTO: 8.7 FL (ref 9.4–12.3)
POTASSIUM SERPL-SCNC: 3.5 MMOL/L (ref 3.5–5)
PROTEIN UA: NEGATIVE MG/DL
RBC # BLD: 4.35 M/UL (ref 4.2–5.4)
RBC UA: 5 /HPF (ref 0–4)
SODIUM BLD-SCNC: 139 MMOL/L (ref 136–145)
SPECIFIC GRAVITY UA: 1.02
TOTAL PROTEIN: 7.3 G/DL (ref 6.6–8.7)
UROBILINOGEN, URINE: 1 E.U./DL
WBC # BLD: 5.5 K/UL (ref 4.8–10.8)
WBC UA: 5 /HPF (ref 0–5)

## 2018-02-06 PROCEDURE — 96375 TX/PRO/DX INJ NEW DRUG ADDON: CPT

## 2018-02-06 PROCEDURE — 99283 EMERGENCY DEPT VISIT LOW MDM: CPT | Performed by: EMERGENCY MEDICINE

## 2018-02-06 PROCEDURE — 99283 EMERGENCY DEPT VISIT LOW MDM: CPT

## 2018-02-06 PROCEDURE — 36415 COLL VENOUS BLD VENIPUNCTURE: CPT

## 2018-02-06 PROCEDURE — 80053 COMPREHEN METABOLIC PANEL: CPT

## 2018-02-06 PROCEDURE — 83690 ASSAY OF LIPASE: CPT

## 2018-02-06 PROCEDURE — 6360000002 HC RX W HCPCS: Performed by: EMERGENCY MEDICINE

## 2018-02-06 PROCEDURE — 2580000003 HC RX 258: Performed by: EMERGENCY MEDICINE

## 2018-02-06 PROCEDURE — 81001 URINALYSIS AUTO W/SCOPE: CPT

## 2018-02-06 PROCEDURE — 85025 COMPLETE CBC W/AUTO DIFF WBC: CPT

## 2018-02-06 PROCEDURE — 96374 THER/PROPH/DIAG INJ IV PUSH: CPT

## 2018-02-06 RX ORDER — PROMETHAZINE HYDROCHLORIDE 25 MG/1
25 TABLET ORAL EVERY 6 HOURS PRN
Qty: 15 TABLET | Refills: 0 | Status: SHIPPED | OUTPATIENT
Start: 2018-02-06 | End: 2018-02-13

## 2018-02-06 RX ORDER — METOCLOPRAMIDE HYDROCHLORIDE 5 MG/ML
10 INJECTION INTRAMUSCULAR; INTRAVENOUS ONCE
Status: COMPLETED | OUTPATIENT
Start: 2018-02-06 | End: 2018-02-06

## 2018-02-06 RX ORDER — ONDANSETRON 2 MG/ML
4 INJECTION INTRAMUSCULAR; INTRAVENOUS ONCE
Status: COMPLETED | OUTPATIENT
Start: 2018-02-06 | End: 2018-02-06

## 2018-02-06 RX ORDER — DEXAMETHASONE SODIUM PHOSPHATE 10 MG/ML
10 INJECTION, SOLUTION INTRAMUSCULAR; INTRAVENOUS ONCE
Status: COMPLETED | OUTPATIENT
Start: 2018-02-06 | End: 2018-02-06

## 2018-02-06 RX ORDER — 0.9 % SODIUM CHLORIDE 0.9 %
1000 INTRAVENOUS SOLUTION INTRAVENOUS ONCE
Status: COMPLETED | OUTPATIENT
Start: 2018-02-06 | End: 2018-02-06

## 2018-02-06 RX ORDER — DIPHENHYDRAMINE HYDROCHLORIDE 50 MG/ML
50 INJECTION INTRAMUSCULAR; INTRAVENOUS ONCE
Status: COMPLETED | OUTPATIENT
Start: 2018-02-06 | End: 2018-02-06

## 2018-02-06 RX ADMIN — DEXAMETHASONE SODIUM PHOSPHATE 10 MG: 10 INJECTION, SOLUTION INTRAMUSCULAR; INTRAVENOUS at 20:47

## 2018-02-06 RX ADMIN — DIPHENHYDRAMINE HYDROCHLORIDE 50 MG: 50 INJECTION, SOLUTION INTRAMUSCULAR; INTRAVENOUS at 20:49

## 2018-02-06 RX ADMIN — ONDANSETRON 4 MG: 2 INJECTION INTRAMUSCULAR; INTRAVENOUS at 20:45

## 2018-02-06 RX ADMIN — METOCLOPRAMIDE 10 MG: 5 INJECTION, SOLUTION INTRAMUSCULAR; INTRAVENOUS at 20:46

## 2018-02-06 RX ADMIN — SODIUM CHLORIDE 1000 ML: 9 INJECTION, SOLUTION INTRAVENOUS at 20:41

## 2018-02-06 ASSESSMENT — ENCOUNTER SYMPTOMS
COLOR CHANGE: 0
PHOTOPHOBIA: 0
NAUSEA: 1
WHEEZING: 0
RHINORRHEA: 0
BACK PAIN: 0
VOMITING: 1
EYE PAIN: 0
CONSTIPATION: 0
ABDOMINAL DISTENTION: 0
ABDOMINAL PAIN: 1
DIARRHEA: 1
COUGH: 0
CHEST TIGHTNESS: 0
SORE THROAT: 0
TROUBLE SWALLOWING: 0
SHORTNESS OF BREATH: 0

## 2018-02-06 ASSESSMENT — PAIN SCALES - GENERAL: PAINLEVEL_OUTOF10: 10

## 2018-02-06 ASSESSMENT — PAIN DESCRIPTION - LOCATION: LOCATION: ABDOMEN;HEAD

## 2018-02-06 ASSESSMENT — PAIN DESCRIPTION - PAIN TYPE: TYPE: ACUTE PAIN

## 2018-02-07 NOTE — ED PROVIDER NOTES
workup  Diagnosis management comments: Patient has not had any vomiting or diarrhea or in the ED. I will place her on some Phenergan outpatient. Told to follow up with her primary care. Instructed to return immediately if any further issues or new complaint    Patient Progress  Patient progress: stable      Reassessment      CONSULTS:  None    PROCEDURES:  Unless otherwise noted below, none     Procedures    FINAL IMPRESSION      1.  Gastroenteritis          DISPOSITION/PLAN   DISPOSITION Decision To Discharge 02/06/2018 09:51:28 PM      PATIENT REFERRED TO:  Varsha Peerz MD  [address] [place] [address] Dr De La Rosa [address] [place] [address] [phone]    In 3 days  If symptoms worsen or, As needed      DISCHARGE MEDICATIONS:  New Prescriptions    PROMETHAZINE (PHENERGAN) 25 MG TABLET    Take 1 tablet by mouth every 6 hours as needed for Nausea          (Please note that portions of this note were completed with a voice recognition program.  Efforts were made to edit the dictations but occasionally words are mis-transcribed.)    Ary Harris MD (electronically signed)  Attending Emergency Physician         Kameron Slaughter MD  02/06/18 [id number]

## 2018-03-08 RX ORDER — AMITRIPTYLINE HYDROCHLORIDE 50 MG/1
TABLET, FILM COATED ORAL
Qty: 30 TABLET | Refills: 3 | Status: SHIPPED | OUTPATIENT
Start: 2018-03-08 | End: 2018-05-30 | Stop reason: SDUPTHER

## 2018-03-08 RX ORDER — CELECOXIB 200 MG/1
CAPSULE ORAL
Qty: 30 CAPSULE | Refills: 3 | Status: SHIPPED | OUTPATIENT
Start: 2018-03-08 | End: 2018-05-30 | Stop reason: SDUPTHER

## 2018-03-08 RX ORDER — TOPIRAMATE 50 MG/1
TABLET, FILM COATED ORAL
Qty: 60 TABLET | Refills: 3 | Status: SHIPPED | OUTPATIENT
Start: 2018-03-08 | End: 2018-05-30 | Stop reason: SDUPTHER

## 2018-03-12 DIAGNOSIS — G43.809 OTHER MIGRAINE WITHOUT STATUS MIGRAINOSUS, NOT INTRACTABLE: ICD-10-CM

## 2018-03-12 DIAGNOSIS — F41.9 ANXIETY: ICD-10-CM

## 2018-03-12 RX ORDER — SUMATRIPTAN 25 MG/1
25 TABLET, FILM COATED ORAL
Qty: 15 TABLET | Refills: 0 | Status: SHIPPED | OUTPATIENT
Start: 2018-03-12 | End: 2018-07-26 | Stop reason: SDUPTHER

## 2018-03-12 NOTE — TELEPHONE ENCOUNTER
Patient called today for a refill of Gabapentin   Last visit 01/04/18 Next visit 04/04/18  Last UDS 11/30/2017  4:11 PM - Abel Owens LPN     Component Results     Component Collected Lab   Amphetamine Screen, Urine 11/30/2017  4:10 PM Unknown   -    Barbiturate Screen, Urine 11/30/2017  4:10 PM Unknown   -    Benzodiazepine Screen, Urine 11/30/2017  4:10 PM Unknown   +    Comment:   Due to [address] 11/30/2017  4:10 PM Unknown   -    THC 11/30/2017  4:10 PM Unknown   -    MDMA URINE 11/30/2017  4:10 PM Unknown   -    Methadone Screen, Urine 11/30/2017  4:10 PM Unknown   -    Opiate Scrn, Ur 11/30/2017  4:10 PM Unknown   -    Oxycodone Screen, Ur 11/30/2017  4:10 PM Unknown   -    PCP Scrn, Ur 11/30/2017  4:10 PM Unknown   -    Propoxyphene Screen, Urine 11/30/2017  4:10 PM Unknown   -    Tricyclic Antidepressants, Ur 11/30/2017  4:10 PM Unknown   -    Methamphetamine, Urine 11/30/2017  4:10 PM Unknown   -

## 2018-03-13 RX ORDER — GABAPENTIN 600 MG/1
600 TABLET ORAL 3 TIMES DAILY
Qty: 90 TABLET | Refills: 1 | Status: SHIPPED | OUTPATIENT
Start: 2018-03-13 | End: 2018-05-30 | Stop reason: SDUPTHER

## 2018-03-13 NOTE — TELEPHONE ENCOUNTER
SAMEERA was reviewed today per office protocol. Report shows No discrepancies. Fill pattern is consistent from single provider(s) at single pharmacy(s). Prescription escribed.  Patient is aware to check within the pharmacy

## 2018-03-19 ENCOUNTER — OFFICE VISIT (OUTPATIENT)
Dept: PRIMARY CARE CLINIC | Age: 53
End: 2018-03-19
Payer: COMMERCIAL

## 2018-03-19 VITALS
DIASTOLIC BLOOD PRESSURE: 95 MMHG | BODY MASS INDEX: 24.49 KG/M2 | SYSTOLIC BLOOD PRESSURE: 128 MMHG | RESPIRATION RATE: 18 BRPM | HEIGHT: 65 IN | WEIGHT: 147 LBS | HEART RATE: 101 BPM | TEMPERATURE: 97.9 F | OXYGEN SATURATION: 100 %

## 2018-03-19 DIAGNOSIS — R59.0 ANTERIOR CERVICAL LYMPHADENOPATHY: Primary | ICD-10-CM

## 2018-03-19 PROCEDURE — G8427 DOCREV CUR MEDS BY ELIG CLIN: HCPCS | Performed by: NURSE PRACTITIONER

## 2018-03-19 PROCEDURE — G8482 FLU IMMUNIZE ORDER/ADMIN: HCPCS | Performed by: NURSE PRACTITIONER

## 2018-03-19 PROCEDURE — 3014F SCREEN MAMMO DOC REV: CPT | Performed by: NURSE PRACTITIONER

## 2018-03-19 PROCEDURE — 3017F COLORECTAL CA SCREEN DOC REV: CPT | Performed by: NURSE PRACTITIONER

## 2018-03-19 PROCEDURE — 99213 OFFICE O/P EST LOW 20 MIN: CPT | Performed by: NURSE PRACTITIONER

## 2018-03-19 PROCEDURE — 4004F PT TOBACCO SCREEN RCVD TLK: CPT | Performed by: NURSE PRACTITIONER

## 2018-03-19 PROCEDURE — G8420 CALC BMI NORM PARAMETERS: HCPCS | Performed by: NURSE PRACTITIONER

## 2018-03-19 RX ORDER — HYDROCODONE BITARTRATE AND ACETAMINOPHEN 5; 325 MG/1; MG/1
1 TABLET ORAL EVERY 6 HOURS PRN
Qty: 28 TABLET | Refills: 0 | Status: SHIPPED | OUTPATIENT
Start: 2018-03-19 | End: 2018-03-22

## 2018-03-19 RX ORDER — HYDROCODONE BITARTRATE AND ACETAMINOPHEN 5; 325 MG/1; MG/1
1 TABLET ORAL EVERY 6 HOURS PRN
Qty: 28 TABLET | Refills: 0 | Status: SHIPPED | OUTPATIENT
Start: 2018-03-19 | End: 2018-03-19 | Stop reason: SDUPTHER

## 2018-03-21 ENCOUNTER — HOSPITAL ENCOUNTER (OUTPATIENT)
Dept: ULTRASOUND IMAGING | Age: 53
Discharge: HOME | End: 2018-03-21
Payer: COMMERCIAL

## 2018-03-21 DIAGNOSIS — R59.0 ANTERIOR CERVICAL LYMPHADENOPATHY: ICD-10-CM

## 2018-03-21 DIAGNOSIS — R22.1 NECK MASS: Primary | ICD-10-CM

## 2018-03-21 LAB
ALBUMIN SERPL-MCNC: 4.5 G/DL (ref 3.5–5.2)
ALP BLD-CCNC: 70 U/L (ref 35–104)
ALT SERPL-CCNC: 9 U/L (ref 5–33)
ANION GAP SERPL CALCULATED.3IONS-SCNC: 18 MMOL/L (ref 7–19)
AST SERPL-CCNC: 12 U/L (ref 5–32)
BILIRUB SERPL-MCNC: <0.2 MG/DL (ref 0.2–1.2)
BUN BLDV-MCNC: 14 MG/DL (ref 6–20)
C-REACTIVE PROTEIN: 0.18 MG/DL (ref 0–0.5)
CALCIUM SERPL-MCNC: 9.6 MG/DL (ref 8.6–10)
CHLORIDE BLD-SCNC: 108 MMOL/L (ref 98–111)
CO2: 19 MMOL/L (ref 22–29)
CREAT SERPL-MCNC: 0.7 MG/DL (ref 0.5–0.9)
GFR NON-AFRICAN AMERICAN: >60
GLUCOSE BLD-MCNC: 99 MG/DL (ref 74–109)
HCT VFR BLD CALC: 42.9 % (ref 37–47)
HEMOGLOBIN: 13.9 G/DL (ref 12–16)
MCH RBC QN AUTO: 33.3 PG (ref 27–31)
MCHC RBC AUTO-ENTMCNC: 32.4 G/DL (ref 33–37)
MCV RBC AUTO: 102.9 FL (ref 81–99)
PDW BLD-RTO: 12.2 % (ref 11.5–14.5)
PLATELET # BLD: 289 K/UL (ref 130–400)
PMV BLD AUTO: 9.2 FL (ref 9.4–12.3)
POTASSIUM SERPL-SCNC: 4.3 MMOL/L (ref 3.5–5)
RBC # BLD: 4.17 M/UL (ref 4.2–5.4)
SEDIMENTATION RATE, ERYTHROCYTE: 8 MM/HR (ref 0–25)
SODIUM BLD-SCNC: 145 MMOL/L (ref 136–145)
TOTAL PROTEIN: 7.6 G/DL (ref 6.6–8.7)
WBC # BLD: 7.6 K/UL (ref 4.8–10.8)

## 2018-03-21 PROCEDURE — 76536 US EXAM OF HEAD AND NECK: CPT

## 2018-03-22 ENCOUNTER — TELEPHONE (OUTPATIENT)
Dept: PRIMARY CARE CLINIC | Age: 53
End: 2018-03-22

## 2018-03-23 ENCOUNTER — TELEPHONE (OUTPATIENT)
Dept: PRIMARY CARE CLINIC | Age: 53
End: 2018-03-23

## 2018-03-23 NOTE — TELEPHONE ENCOUNTER
Notified patient of abnormal results. Pt has an appt on 03/26/18 @ 230 with ENT    ----- Message from JERILYN Vázquez sent at 3/21/2018  9:09 PM CDT -----      Please get the patient set up with ENT, she will probably need a CT of the neck but if we can get her in a reasonable amount of time let's see what they recommend! US Head Neck Soft Tissue Thyroid   Order: [id number]   Status:  Final result   Visible to patient:  Yes (MyChart) Dx: Anterior cervical lymphadenopathy   Notes Recorded by JERILYN Vázquez on 3/21/2018 at 9:09 PM CDT  Please notify patient of abnormal results. Please get the patient set up with ENT, she will probably need a CT of the neck but if we can get her in a reasonable amount of time let's see what they recommend! Details     Reading Physician Reading Date Result Priority   Linwood Arambula MD 3/21/2018    Narrative     Examination. US HEAD NECK SOFT TISSUE THYROID  History: History of cervical lymphadenopathy. The patient has a  painful palpable nodule on the right side of the neck below the angle  of the mandible. The ultrasound examination of the neck is performed. There is no  previous study for comparison. There is a poorly defined hyperechoic mass in the subcutaneous  location, in the area of concern, measuring 2.8 x 0.4 x 1.5 cm which  is adherent to the skin and is poorly marginated from the surrounding  subcutaneous fat. No focal fluid accumulation. Impression     An ill-defined subcutaneous echogenic mass/area in the  right upper neck. This may represent an inflammatory process? Leatha Jo The  nature of this abnormality and etiology is not certain. Further  follow-up may be obtained.   Signed by Dr Gwendolyn Jin on 3/21/2018 1:47 PM

## 2018-04-02 ENCOUNTER — OFFICE VISIT (OUTPATIENT)
Dept: OTOLARYNGOLOGY | Age: 53
End: 2018-04-02
Payer: COMMERCIAL

## 2018-04-02 VITALS
RESPIRATION RATE: 18 BRPM | BODY MASS INDEX: 26.4 KG/M2 | SYSTOLIC BLOOD PRESSURE: 138 MMHG | TEMPERATURE: 98.9 F | WEIGHT: 149 LBS | DIASTOLIC BLOOD PRESSURE: 84 MMHG | HEART RATE: 92 BPM | OXYGEN SATURATION: 99 % | HEIGHT: 63 IN

## 2018-04-02 DIAGNOSIS — M54.2 CERVICAL PAIN (NECK): ICD-10-CM

## 2018-04-02 DIAGNOSIS — R09.89 CHRONIC THROAT CLEARING: ICD-10-CM

## 2018-04-02 DIAGNOSIS — Z98.890 HISTORY OF NECK SURGERY: ICD-10-CM

## 2018-04-02 DIAGNOSIS — M77.9 STYLOHYOID TENDINITIS: ICD-10-CM

## 2018-04-02 DIAGNOSIS — R22.1 NECK MASS: Primary | ICD-10-CM

## 2018-04-02 DIAGNOSIS — K21.9 LARYNGOPHARYNGEAL REFLUX (LPR): ICD-10-CM

## 2018-04-02 DIAGNOSIS — M79.18 MYOFACIAL MUSCLE PAIN: ICD-10-CM

## 2018-04-02 PROCEDURE — 4004F PT TOBACCO SCREEN RCVD TLK: CPT | Performed by: OTOLARYNGOLOGY

## 2018-04-02 PROCEDURE — 99204 OFFICE O/P NEW MOD 45 MIN: CPT | Performed by: OTOLARYNGOLOGY

## 2018-04-02 PROCEDURE — 3017F COLORECTAL CA SCREEN DOC REV: CPT | Performed by: OTOLARYNGOLOGY

## 2018-04-02 PROCEDURE — G8417 CALC BMI ABV UP PARAM F/U: HCPCS | Performed by: OTOLARYNGOLOGY

## 2018-04-02 PROCEDURE — G8427 DOCREV CUR MEDS BY ELIG CLIN: HCPCS | Performed by: OTOLARYNGOLOGY

## 2018-04-02 PROCEDURE — 3014F SCREEN MAMMO DOC REV: CPT | Performed by: OTOLARYNGOLOGY

## 2018-04-02 PROCEDURE — 31575 DIAGNOSTIC LARYNGOSCOPY: CPT | Performed by: OTOLARYNGOLOGY

## 2018-04-06 RX ORDER — FLUTICASONE PROPIONATE 50 MCG
SPRAY, SUSPENSION (ML) NASAL
Qty: 1 BOTTLE | Refills: 0 | Status: SHIPPED | OUTPATIENT
Start: 2018-04-06 | End: 2018-05-06 | Stop reason: SDUPTHER

## 2018-05-04 RX ORDER — PROPRANOLOL HYDROCHLORIDE 10 MG/1
TABLET ORAL
Qty: 90 TABLET | Refills: 3 | Status: SHIPPED | OUTPATIENT
Start: 2018-05-04 | End: 2018-05-30 | Stop reason: SDUPTHER

## 2018-05-21 ENCOUNTER — TELEPHONE (OUTPATIENT)
Dept: OTOLARYNGOLOGY | Age: 53
End: 2018-05-21

## 2018-05-30 ENCOUNTER — OFFICE VISIT (OUTPATIENT)
Dept: PRIMARY CARE CLINIC | Age: 53
End: 2018-05-30
Payer: COMMERCIAL

## 2018-05-30 VITALS
BODY MASS INDEX: 28.17 KG/M2 | HEIGHT: 63 IN | DIASTOLIC BLOOD PRESSURE: 82 MMHG | TEMPERATURE: 98 F | OXYGEN SATURATION: 98 % | HEART RATE: 74 BPM | WEIGHT: 159 LBS | SYSTOLIC BLOOD PRESSURE: 122 MMHG

## 2018-05-30 DIAGNOSIS — F41.9 ANXIETY: Primary | ICD-10-CM

## 2018-05-30 DIAGNOSIS — G47.33 OSA (OBSTRUCTIVE SLEEP APNEA): ICD-10-CM

## 2018-05-30 DIAGNOSIS — R10.13 EPIGASTRIC PAIN: ICD-10-CM

## 2018-05-30 DIAGNOSIS — Z23 NEED FOR PROPHYLACTIC VACCINATION AND INOCULATION AGAINST VARICELLA: ICD-10-CM

## 2018-05-30 DIAGNOSIS — R60.0 LOWER EXTREMITY EDEMA: ICD-10-CM

## 2018-05-30 DIAGNOSIS — L72.3 SEBACEOUS CYST: ICD-10-CM

## 2018-05-30 DIAGNOSIS — M54.2 CHRONIC NECK PAIN: ICD-10-CM

## 2018-05-30 DIAGNOSIS — K21.9 GASTROESOPHAGEAL REFLUX DISEASE WITHOUT ESOPHAGITIS: ICD-10-CM

## 2018-05-30 DIAGNOSIS — Z12.31 ENCOUNTER FOR SCREENING MAMMOGRAM FOR BREAST CANCER: ICD-10-CM

## 2018-05-30 DIAGNOSIS — G89.29 CHRONIC NECK PAIN: ICD-10-CM

## 2018-05-30 PROCEDURE — G8417 CALC BMI ABV UP PARAM F/U: HCPCS | Performed by: FAMILY MEDICINE

## 2018-05-30 PROCEDURE — 99214 OFFICE O/P EST MOD 30 MIN: CPT | Performed by: FAMILY MEDICINE

## 2018-05-30 PROCEDURE — 3017F COLORECTAL CA SCREEN DOC REV: CPT | Performed by: FAMILY MEDICINE

## 2018-05-30 PROCEDURE — 4004F PT TOBACCO SCREEN RCVD TLK: CPT | Performed by: FAMILY MEDICINE

## 2018-05-30 PROCEDURE — G8427 DOCREV CUR MEDS BY ELIG CLIN: HCPCS | Performed by: FAMILY MEDICINE

## 2018-05-30 RX ORDER — GABAPENTIN 600 MG/1
600 TABLET ORAL 3 TIMES DAILY
Qty: 90 TABLET | Refills: 1 | Status: SHIPPED | OUTPATIENT
Start: 2018-05-30 | End: 2018-07-26 | Stop reason: SDUPTHER

## 2018-05-30 RX ORDER — CELECOXIB 200 MG/1
CAPSULE ORAL
Qty: 90 CAPSULE | Refills: 3 | Status: SHIPPED | OUTPATIENT
Start: 2018-05-30 | End: 2018-08-17

## 2018-05-30 RX ORDER — OMEPRAZOLE 40 MG/1
CAPSULE, DELAYED RELEASE ORAL
Qty: 90 CAPSULE | Refills: 3 | Status: SHIPPED | OUTPATIENT
Start: 2018-05-30 | End: 2018-09-03 | Stop reason: SDUPTHER

## 2018-05-30 RX ORDER — TIZANIDINE 4 MG/1
4 TABLET ORAL EVERY 6 HOURS PRN
Qty: 60 TABLET | Refills: 3 | Status: SHIPPED | OUTPATIENT
Start: 2018-05-30 | End: 2018-08-17 | Stop reason: SDUPTHER

## 2018-05-30 RX ORDER — CITALOPRAM 40 MG/1
TABLET ORAL
Qty: 90 TABLET | Refills: 3 | Status: SHIPPED | OUTPATIENT
Start: 2018-05-30 | End: 2019-08-06 | Stop reason: SDUPTHER

## 2018-05-30 RX ORDER — TOPIRAMATE 50 MG/1
TABLET, FILM COATED ORAL
Qty: 60 TABLET | Refills: 3 | Status: SHIPPED | OUTPATIENT
Start: 2018-05-30 | End: 2018-10-31 | Stop reason: SDUPTHER

## 2018-05-30 RX ORDER — PROPRANOLOL HYDROCHLORIDE 10 MG/1
TABLET ORAL
Qty: 270 TABLET | Refills: 3 | Status: SHIPPED | OUTPATIENT
Start: 2018-05-30 | End: 2019-08-06 | Stop reason: SDUPTHER

## 2018-05-30 RX ORDER — AMITRIPTYLINE HYDROCHLORIDE 100 MG/1
TABLET, FILM COATED ORAL
Qty: 90 TABLET | Refills: 3 | Status: SHIPPED | OUTPATIENT
Start: 2018-05-30 | End: 2019-08-06 | Stop reason: SDUPTHER

## 2018-05-30 ASSESSMENT — ENCOUNTER SYMPTOMS
COUGH: 0
ROS SKIN COMMENTS: SEBACEOUS CYST
SHORTNESS OF BREATH: 0

## 2018-05-30 ASSESSMENT — PATIENT HEALTH QUESTIONNAIRE - PHQ9
1. LITTLE INTEREST OR PLEASURE IN DOING THINGS: 0
SUM OF ALL RESPONSES TO PHQ9 QUESTIONS 1 & 2: 0
SUM OF ALL RESPONSES TO PHQ QUESTIONS 1-9: 0
2. FEELING DOWN, DEPRESSED OR HOPELESS: 0

## 2018-06-11 ENCOUNTER — HOSPITAL ENCOUNTER (OUTPATIENT)
Dept: WOMENS IMAGING | Age: 53
Discharge: HOME | End: 2018-06-11
Payer: COMMERCIAL

## 2018-06-11 DIAGNOSIS — Z12.31 ENCOUNTER FOR SCREENING MAMMOGRAM FOR BREAST CANCER: ICD-10-CM

## 2018-06-11 PROCEDURE — 77063 BREAST TOMOSYNTHESIS BI: CPT

## 2018-06-12 ENCOUNTER — TELEPHONE (OUTPATIENT)
Dept: PRIMARY CARE CLINIC | Age: 53
End: 2018-06-12

## 2018-07-16 ENCOUNTER — OFFICE VISIT (OUTPATIENT)
Dept: GASTROENTEROLOGY | Age: 53
End: 2018-07-16
Payer: COMMERCIAL

## 2018-07-16 VITALS
HEART RATE: 75 BPM | DIASTOLIC BLOOD PRESSURE: 60 MMHG | SYSTOLIC BLOOD PRESSURE: 100 MMHG | BODY MASS INDEX: 27.21 KG/M2 | OXYGEN SATURATION: 98 % | WEIGHT: 153.6 LBS | HEIGHT: 63 IN

## 2018-07-16 DIAGNOSIS — R13.19 ESOPHAGEAL DYSPHAGIA: ICD-10-CM

## 2018-07-16 DIAGNOSIS — R10.13 EPIGASTRIC PAIN: Primary | ICD-10-CM

## 2018-07-16 DIAGNOSIS — R11.2 NAUSEA AND VOMITING, INTRACTABILITY OF VOMITING NOT SPECIFIED, UNSPECIFIED VOMITING TYPE: ICD-10-CM

## 2018-07-16 DIAGNOSIS — K21.9 CHRONIC GERD: ICD-10-CM

## 2018-07-16 DIAGNOSIS — R19.8 UNCOMFORTABLE FULLNESS AFTER MEALS: ICD-10-CM

## 2018-07-16 PROCEDURE — G8427 DOCREV CUR MEDS BY ELIG CLIN: HCPCS | Performed by: NURSE PRACTITIONER

## 2018-07-16 PROCEDURE — 99214 OFFICE O/P EST MOD 30 MIN: CPT | Performed by: NURSE PRACTITIONER

## 2018-07-16 PROCEDURE — 3017F COLORECTAL CA SCREEN DOC REV: CPT | Performed by: NURSE PRACTITIONER

## 2018-07-16 PROCEDURE — G8417 CALC BMI ABV UP PARAM F/U: HCPCS | Performed by: NURSE PRACTITIONER

## 2018-07-16 PROCEDURE — 4004F PT TOBACCO SCREEN RCVD TLK: CPT | Performed by: NURSE PRACTITIONER

## 2018-07-16 ASSESSMENT — ENCOUNTER SYMPTOMS
NAUSEA: 0
DIARRHEA: 0
ABDOMINAL PAIN: 1
BACK PAIN: 0
CHEST TIGHTNESS: 0
VOMITING: 0
SHORTNESS OF BREATH: 0
BLOOD IN STOOL: 0
CONSTIPATION: 1
COUGH: 0
ALLERGIC/IMMUNOLOGIC NEGATIVE: 1
SORE THROAT: 0
EYES NEGATIVE: 1
VOICE CHANGE: 0
RECTAL PAIN: 0

## 2018-07-16 NOTE — PROGRESS NOTES
Subjective:      Patient ID: Aleda Schlatter is a 47 y.o. female  Skip Menchaca MD    [facility]  Chief Complaint   Patient presents with    Abdominal Pain     upper    Gastroesophageal Reflux       Patient referred for epigastric pain. Onset months ago. Pain radiates to all of upper abdomen. Begins as a burning and then feels pressure in upper abdomen. Very uncomfortable. Associated with nausea and vomiting. C/o dysphagia. Pills and solid foods get stuck in esophagus for short time. Gets choked on liquids easily. C/o chronic heartburn/reflux. Takes omeprazole 40 mg daily. Does not seem to work well any more. C/o fullness after eating. Abdomen feels distended / bloated and can't get any relief. She has chronic constipation. Takes Amitiza for this. Family History   Problem Relation Age of Onset    Colon Cancer Maternal Aunt     Colon Polyps Maternal Aunt     Liver Cancer Neg Hx     Liver Disease Neg Hx     Rectal Cancer Neg Hx     Stomach Cancer Neg Hx        Past Medical History:   Diagnosis Date    Anxiety     Arthritis     Chronic tension-type headache, intractable 1/8/2016    Depression     Headache(784.0)     migraine    Lupus     Neck pain 1/8/2016       Past Surgical History:   Procedure Laterality Date    COLONOSCOPY  10/23/2013    Dr Medina Sizer: internal hemorrhoids; hyperplastic polyps (5 yr recal for screening)    HYSTERECTOMY      NECK SURGERY  07/26/2016    Dr Trujillo Stain FLX DX W/COLLJ SPEC WHEN PFRMD N/A 2/6/2017    COLONOSCOPY DIAGNOSTIC OR SCREENING performed by Jules Colvin DO at [facility] OR    TUBAL LIGATION      TUMOR REMOVAL      right deltoid area    UPPER GASTROINTESTINAL ENDOSCOPY  11/06/2013    Dr Medina Sizer: normal       Current Outpatient Prescriptions   Medication Sig Dispense Refill    amitriptyline (ELAVIL) 100 MG tablet TAKE ONE TABLET BY MOUTH NIGHTLY.  90 tablet 3    celecoxib (CELEBREX) 200 MG capsule TAKE ONE CAPSULE BY MOUTH

## 2018-07-16 NOTE — PATIENT INSTRUCTIONS
Smoking can make GERD worse. If you need help quitting, talk to your doctor about stop-smoking programs and medicines. These can increase your chances of quitting for good. · If you have GERD symptoms at night, raise the head of your bed 6 to 8 inches by putting the frame on blocks or placing a foam wedge under the head of your mattress. (Adding extra pillows does not work.)  · Do not wear tight clothing around your middle. · Lose weight if you need to. Losing just 5 to 10 pounds can help. When should you call for help? Call your doctor now or seek immediate medical care if:    · You have new or different belly pain.     · Your stools are black and tarlike or have streaks of blood.    Watch closely for changes in your health, and be sure to contact your doctor if:    · Your symptoms have not improved after 2 days.     · Food seems to catch in your throat or chest.   Where can you learn more? Go to https://[employer]. org and sign in to your [employer] account. Enter E741 in the [employer] box to learn more about \"Gastroesophageal Reflux Disease (GERD): Care Instructions. \"     If you do not have an account, please click on the \"Sign Up Now\" link. Current as of: May 12, 2017  Content Version: 11.6  © 7242-7440 [employer]. Care instructions adapted under license by [facility] ([facility]). If you have questions about a medical condition or this instruction, always ask your healthcare professional. [place] [address] any warranty or liability for your use of this information. Schedule endoscopy  Nothing to eat or drink after midnight. You will not be able to drive for 24 hours after the procedure due to sedation. Bring a  with you the day of the procedure. No aspirin, ibuprofen, naproxen, fish oil or vitamin E for 5 days before procedure. Continue current medications.       If you are on blood thinners, clearance from the prescribing physician will be

## 2018-07-25 ENCOUNTER — ANESTHESIA EVENT (OUTPATIENT)
Dept: OPERATING ROOM | Age: 53
End: 2018-07-25

## 2018-07-26 ENCOUNTER — HOSPITAL ENCOUNTER (OUTPATIENT)
Age: 53
Setting detail: SPECIMEN
Discharge: HOME | End: 2018-07-26
Payer: COMMERCIAL

## 2018-07-26 ENCOUNTER — HOSPITAL ENCOUNTER (OUTPATIENT)
Age: 53
Setting detail: OUTPATIENT SURGERY
Discharge: HOME | End: 2018-07-26
Attending: INTERNAL MEDICINE | Admitting: INTERNAL MEDICINE
Payer: COMMERCIAL

## 2018-07-26 ENCOUNTER — OFFICE VISIT (OUTPATIENT)
Dept: PRIMARY CARE CLINIC | Age: 53
End: 2018-07-26
Payer: COMMERCIAL

## 2018-07-26 ENCOUNTER — ANESTHESIA (OUTPATIENT)
Dept: OPERATING ROOM | Age: 53
End: 2018-07-26

## 2018-07-26 VITALS
HEIGHT: 63 IN | TEMPERATURE: 98.1 F | BODY MASS INDEX: 27.11 KG/M2 | RESPIRATION RATE: 20 BRPM | HEART RATE: 81 BPM | WEIGHT: 153 LBS | OXYGEN SATURATION: 97 % | DIASTOLIC BLOOD PRESSURE: 76 MMHG | SYSTOLIC BLOOD PRESSURE: 120 MMHG

## 2018-07-26 VITALS
DIASTOLIC BLOOD PRESSURE: 80 MMHG | TEMPERATURE: 97.4 F | OXYGEN SATURATION: 97 % | SYSTOLIC BLOOD PRESSURE: 122 MMHG | WEIGHT: 153.6 LBS | HEART RATE: 84 BPM | BODY MASS INDEX: 27.21 KG/M2 | HEIGHT: 63 IN

## 2018-07-26 VITALS — SYSTOLIC BLOOD PRESSURE: 135 MMHG | DIASTOLIC BLOOD PRESSURE: 92 MMHG | OXYGEN SATURATION: 99 %

## 2018-07-26 DIAGNOSIS — G89.29 CHRONIC NECK PAIN: ICD-10-CM

## 2018-07-26 DIAGNOSIS — G43.809 OTHER MIGRAINE WITHOUT STATUS MIGRAINOSUS, NOT INTRACTABLE: ICD-10-CM

## 2018-07-26 DIAGNOSIS — M54.2 CHRONIC NECK PAIN: ICD-10-CM

## 2018-07-26 PROCEDURE — 43239 EGD BIOPSY SINGLE/MULTIPLE: CPT

## 2018-07-26 PROCEDURE — G8427 DOCREV CUR MEDS BY ELIG CLIN: HCPCS | Performed by: FAMILY MEDICINE

## 2018-07-26 PROCEDURE — G8907 PT DOC NO EVENTS ON DISCHARG: HCPCS

## 2018-07-26 PROCEDURE — 43248 EGD GUIDE WIRE INSERTION: CPT | Performed by: INTERNAL MEDICINE

## 2018-07-26 PROCEDURE — 43239 EGD BIOPSY SINGLE/MULTIPLE: CPT | Performed by: INTERNAL MEDICINE

## 2018-07-26 PROCEDURE — 99213 OFFICE O/P EST LOW 20 MIN: CPT | Performed by: FAMILY MEDICINE

## 2018-07-26 PROCEDURE — 43248 EGD GUIDE WIRE INSERTION: CPT

## 2018-07-26 PROCEDURE — 87077 CULTURE AEROBIC IDENTIFY: CPT

## 2018-07-26 PROCEDURE — G8417 CALC BMI ABV UP PARAM F/U: HCPCS | Performed by: FAMILY MEDICINE

## 2018-07-26 PROCEDURE — G8918 PT W/O PREOP ORDER IV AB PRO: HCPCS

## 2018-07-26 PROCEDURE — 4004F PT TOBACCO SCREEN RCVD TLK: CPT | Performed by: FAMILY MEDICINE

## 2018-07-26 PROCEDURE — 3017F COLORECTAL CA SCREEN DOC REV: CPT | Performed by: FAMILY MEDICINE

## 2018-07-26 RX ORDER — SODIUM CHLORIDE, SODIUM LACTATE, POTASSIUM CHLORIDE, CALCIUM CHLORIDE 600; 310; 30; 20 MG/100ML; MG/100ML; MG/100ML; MG/100ML
INJECTION, SOLUTION INTRAVENOUS CONTINUOUS
Status: DISCONTINUED | OUTPATIENT
Start: 2018-07-26 | End: 2018-07-26 | Stop reason: HOSPADM

## 2018-07-26 RX ORDER — LIDOCAINE HYDROCHLORIDE 10 MG/ML
INJECTION, SOLUTION INFILTRATION; PERINEURAL PRN
Status: DISCONTINUED | OUTPATIENT
Start: 2018-07-26 | End: 2018-07-26 | Stop reason: SDUPTHER

## 2018-07-26 RX ORDER — LIDOCAINE HYDROCHLORIDE 10 MG/ML
1 INJECTION, SOLUTION EPIDURAL; INFILTRATION; INTRACAUDAL; PERINEURAL
Status: DISCONTINUED | OUTPATIENT
Start: 2018-07-26 | End: 2018-07-26 | Stop reason: HOSPADM

## 2018-07-26 RX ORDER — SODIUM CHLORIDE 9 MG/ML
INJECTION, SOLUTION INTRAVENOUS CONTINUOUS
Status: DISCONTINUED | OUTPATIENT
Start: 2018-07-26 | End: 2018-07-26 | Stop reason: HOSPADM

## 2018-07-26 RX ORDER — PROPOFOL 10 MG/ML
INJECTION, EMULSION INTRAVENOUS PRN
Status: DISCONTINUED | OUTPATIENT
Start: 2018-07-26 | End: 2018-07-26 | Stop reason: SDUPTHER

## 2018-07-26 RX ADMIN — PROPOFOL 200 MG: 10 INJECTION, EMULSION INTRAVENOUS at 09:37

## 2018-07-26 RX ADMIN — SODIUM CHLORIDE, SODIUM LACTATE, POTASSIUM CHLORIDE, CALCIUM CHLORIDE: 600; 310; 30; 20 INJECTION, SOLUTION INTRAVENOUS at 09:32

## 2018-07-26 RX ADMIN — LIDOCAINE HYDROCHLORIDE 40 MG: 10 INJECTION, SOLUTION INFILTRATION; PERINEURAL at 09:37

## 2018-07-26 ASSESSMENT — PAIN - FUNCTIONAL ASSESSMENT: PAIN_FUNCTIONAL_ASSESSMENT: 0-10

## 2018-07-26 NOTE — PROGRESS NOTES
HENT:   Head: Normocephalic and atraumatic. Eyes: Conjunctivae are normal. No scleral icterus. Cardiovascular: Normal rate, regular rhythm and normal heart sounds. No murmur heard. Pulmonary/Chest: Effort normal and breath sounds normal. No respiratory distress. She has no wheezes. Musculoskeletal: She exhibits no edema. Cervical back: She exhibits decreased range of motion, tenderness and bony tenderness. Neurological: She is alert and oriented to person, place, and time. Skin: Skin is warm. No rash noted. Psychiatric: Her mood appears not anxious. She does not exhibit a depressed mood. Nursing note and vitals reviewed. Assessment    ICD-10-CM ICD-9-CM    1. Chronic neck pain M54.2 723.1 gabapentin (NEURONTIN) 600 MG tablet 1 po TID. Patient states this does help with her pain. Continue Celebrex 200 mg po daily. G89.29 338.29    2. Other migraine without status migrainosus, not intractable G43.809 346.80 SUMAtriptan (IMITREX) 25 MG tablet 1 po at start of migraines. Continue elavil 100 mg po nightly. Plan    No orders of the defined types were placed in this encounter. No orders of the defined types were placed in this encounter. Return in about 3 months (around 10/26/2018). There are no Patient Instructions on file for this visit.

## 2018-07-26 NOTE — ANESTHESIA PRE PROCEDURE
Rate Last Dose    lidocaine PF 1 % injection 1 mL  1 mL Intradermal Once PRN Lum Sebastian, APRN - CRNA        0.9 % sodium chloride infusion   Intravenous Continuous Lum Sebastian, APRN - CRNA        lactated ringers infusion   Intravenous Continuous Mookie Mandel DO           Allergies:     Allergies   Allergen Reactions    Ventolin [Albuterol] Swelling       Problem List:    Patient Active Problem List   Diagnosis Code    Rectal bleeding K62.5    Rectal pain K62.89    Lower abdominal pain R10.30    Chronic constipation K59.09    Heartburn R12    Uncomfortable fullness after meals K30    Chronic GERD K21.9    Benign colon polyp K63.5    Panic attack F41.0    Depression F32.9    Neck pain M54.2    Chronic tension-type headache, intractable G44.221    Cervical radiculopathy M54.12    Cervical disc disease M50.90    Cervical disc disease M50.90    Hypoesthesia of skin R20.1    Pain in both upper extremities M79.601, M79.602    Abdominal cramping R10.9    Difficult bowel movements K59.00    Anxiety F41.9    Internal hemorrhoid, bleeding K64.8    Epigastric pain R10.13    Nausea and vomiting R11.2    Esophageal dysphagia R13.10       Past Medical History:        Diagnosis Date    Anxiety     Arthritis     Chronic tension-type headache, intractable 1/8/2016    Depression     Headache(784.0)     migraine    Lupus     Neck pain 1/8/2016       Past Surgical History:        Procedure Laterality Date    COLONOSCOPY  10/23/2013    Dr Lela Rosenthal: internal hemorrhoids; hyperplastic polyps (5 yr recal for screening)    HYSTERECTOMY      NECK SURGERY  07/26/2016    Dr Katerina Stone FLX DX W/TARUN Zamarripa 1977 PFRMD N/A 2/6/2017    COLONOSCOPY DIAGNOSTIC OR SCREENING performed by Courtney Cross DO at [facility] OR    TUBAL LIGATION      TUMOR REMOVAL      right deltoid area    UPPER GASTROINTESTINAL ENDOSCOPY  11/06/2013    Dr Lela Rosenthal: normal       Social History:    Social History Substance Use Topics    Smoking status: Current Every Day Smoker     Packs/day: 0.50     Years: 32.00     Types: Cigarettes, E-Cigarettes    Smokeless tobacco: Never Used    Alcohol use No                                Ready to quit: Not Answered  Counseling given: Not Answered      Vital Signs (Current): There were no vitals filed for this visit. BP Readings from Last 3 Encounters:   07/26/18 122/80   07/16/18 100/60   05/30/18 122/82       NPO Status:                                                                                 BMI:   Wt Readings from Last 3 Encounters:   07/26/18 153 lb 9.6 oz (69.7 kg)   07/16/18 153 lb 9.6 oz (69.7 kg)   05/30/18 159 lb (72.1 kg)     There is no height or weight on file to calculate BMI.    CBC:   Lab Results   Component Value Date    WBC 7.6 03/21/2018    RBC 4.17 03/21/2018    HGB 13.9 03/21/2018    HCT 42.9 03/21/2018    .9 03/21/2018    RDW 12.2 03/21/2018     03/21/2018       CMP:   Lab Results   Component Value Date     06/04/2018    K 4.2 06/04/2018     06/04/2018    CO2 20 06/04/2018    BUN 14 06/04/2018    CREATININE 1.05 06/04/2018    AGRATIO 1.8 06/04/2018    LABGLOM >60 03/21/2018    GLUCOSE 93 06/04/2018    PROT 6.8 06/04/2018    CALCIUM 9.2 06/04/2018    BILITOT 0.3 06/04/2018    ALKPHOS 62 06/04/2018    AST 12 06/04/2018    ALT 7 06/04/2018       POC Tests: No results for input(s): POCGLU, POCNA, POCK, POCCL, POCBUN, POCHEMO, POCHCT in the last 72 hours.     Coags: No results found for: PROTIME, INR, APTT    HCG (If Applicable): No results found for: PREGTESTUR, PREGSERUM, HCG, HCGQUANT     ABGs: No results found for: PHART, PO2ART, FQA5LIC, NQQ9LUI, BEART, S0PLSIWM     Type & Screen (If Applicable):  No results found for: LABABO, [address]    Anesthesia Evaluation  Patient summary reviewed and Nursing notes reviewed no history of anesthetic complications:   Airway: Mallampati: II  TM

## 2018-07-26 NOTE — ANESTHESIA POSTPROCEDURE EVALUATION
Department of Anesthesiology  Postprocedure Note    Patient: Queta Underwood  MRN: [id number]  YOB: 1965  Date of evaluation: 7/26/2018  Time:  9:47 AM     Procedure Summary     Date:  07/26/18 Room / Location:  [facility] ENDO 01 / [facility] OR    Anesthesia Start:  0935 Anesthesia Stop:      Procedures:       EGD BIOPSY (N/A )      EGD DILATION SAVORY Diagnosis:  (EPIG PAIN, N/V , DYSPHAGIA, REFLUX, FULLNES AFTER EATING)    Surgeon:  Ronnie Abreu DO Responsible Provider:  JERILYN Leonard CRNA    Anesthesia Type:  TIVA, general ASA Status:  2          Anesthesia Type: TIVA, general    Staci Phase I:      Staci Phase II:      Last vitals: Reviewed and per EMR flowsheets.        Anesthesia Post Evaluation    Patient location during evaluation: bedside  Patient participation: complete - patient participated  Level of consciousness: sleepy but conscious  Pain score: 0  Airway patency: patent  Nausea & Vomiting: no nausea and no vomiting  Complications: no  Cardiovascular status: hemodynamically stable and blood pressure returned to baseline  Respiratory status: acceptable and nasal cannula  Hydration status: stable

## 2018-07-26 NOTE — OP NOTE
Post Procedure Note    Name of surgeon / : Ronnie Abreu DO    Date of Service: 07/26/18    Pre-operative Diagnosis:   Active Hospital Problems    Diagnosis Date Noted    Epigastric pain [R10.13] 07/16/2018    Esophageal dysphagia [R13.10] 07/16/2018       Post-operative Diagnosis/Findings: mild esophageal narrowing    Procedure: Procedure(s):  EGD BIOPSY  EGD DILATION CINDIORY, 51 F over wire    Anesthesia: Monitor Anesthesia Care    Surgeons/Assistants: Ronnie Abreu DO    Referring Physician: Jordan Bhatia, *    Procedure Note:  After informed consent was obtained, the patient was placed in the left lateral decubitus position and sedated per MAC. The endoscope was advanced down the oropharynx, down the esophagus, through the gastric lumen, into the duodenum. The small bowel appeared normal.  The gastric antrum was normal. \ Antral biopsies were obtained for h. Pylori. The gastric body was normal.  Retroflexion was done which showed a normal fundus. The scope was withdrawn. The GE junction was at 35 cm. There was a mild narrowing seen in the distal esophagus. The remaining esophagus was normal.  The scope was then advanced back down into the gastric antrum. A guidewire was placed through the endoscope and the endoscope was removed. A 51 French savory dilator was advanced down the length of the esophagus. The dilator and guidewire were removed. The patient tolerated the procedure well. Estimated Blood Loss: Minimal    Complications: None    Specimens:   ID Type Source Tests Collected by Time Destination   1 : MARY TEST Tissue Stomach MARY TEST Jaqueline Mandel  7/26/2018 6479        Discussion: The patient had an esophageal stricture s/p egd with dilation. Repeat egd with dilation as needed. If she is positive for h. Pylori, we will treat.     Ronnie Abreu DO  07/26/18  9:48 AM

## 2018-07-26 NOTE — H&P
NOSTRIL ONCE DAILY 5/7/18  Yes JERILYN Paiz   SUMAtriptan (IMITREX) 25 MG tablet Take 1 tablet by mouth once as needed for Migraine 3/12/18 7/26/18 Yes James mSith MD   AMITIZA 24 MCG capsule TAKE ONE CAPSULE BY MOUTH TWICE DAILY WITH MEALS 12/13/17  Yes JERILYN Paiz       Allergies:   is allergic to ventolin [albuterol]. Vital Signs: There were no vitals filed for this visit. ROS:  Cardiac:  [x]WNL  []Comments:  Pulmonary:  [x]WNL   []Comments:  Neuro/Mental Status:  [x]WNL  []Comments:  Abdominal:                   Active Hospital Problems    Diagnosis Date Noted    Epigastric pain [R10.13] 07/16/2018    Esophageal dysphagia [R13.10] 07/16/2018        All other pertinent GI symptoms negative. Physical Exam:  Cardiac:  [x]WNL  []Comments:  Pulmonary:  [x]WNL   []Comments:  Neuro/Mental Status:  [x]WNL  []Comments:  Abdominal:  [x]WNL    []Comments:  Other:   []WNL  []Comments:    Informed Consent:  The risks and benefits of the procedure have been discussed with either the patient or if they cannot consent, their representative. Assessment:  Patient examined and appropriate for planned sedation and procedure. Plan:  Proceed with planned sedation and procedure as above.     Mookie Mandel DO  9:29 AM

## 2018-07-27 LAB — CLOTEST: NEGATIVE

## 2018-08-06 RX ORDER — SUMATRIPTAN 25 MG/1
25 TABLET, FILM COATED ORAL
Qty: 15 TABLET | Refills: 0 | Status: SHIPPED | OUTPATIENT
Start: 2018-08-06 | End: 2019-09-13 | Stop reason: SDUPTHER

## 2018-08-06 RX ORDER — GABAPENTIN 600 MG/1
600 TABLET ORAL 3 TIMES DAILY
Qty: 90 TABLET | Refills: 1 | Status: SHIPPED | OUTPATIENT
Start: 2018-08-06 | End: 2018-10-29 | Stop reason: SDUPTHER

## 2018-08-06 ASSESSMENT — ENCOUNTER SYMPTOMS
SHORTNESS OF BREATH: 0
COUGH: 0
BACK PAIN: 1

## 2018-08-17 ENCOUNTER — TELEPHONE (OUTPATIENT)
Dept: PRIMARY CARE CLINIC | Age: 53
End: 2018-08-17

## 2018-08-17 ENCOUNTER — OFFICE VISIT (OUTPATIENT)
Dept: PRIMARY CARE CLINIC | Age: 53
End: 2018-08-17
Payer: COMMERCIAL

## 2018-08-17 ENCOUNTER — HOSPITAL ENCOUNTER (OUTPATIENT)
Dept: GENERAL RADIOLOGY | Age: 53
Discharge: HOME | End: 2018-08-17
Payer: COMMERCIAL

## 2018-08-17 VITALS
OXYGEN SATURATION: 96 % | HEIGHT: 63 IN | DIASTOLIC BLOOD PRESSURE: 80 MMHG | SYSTOLIC BLOOD PRESSURE: 122 MMHG | BODY MASS INDEX: 27.25 KG/M2 | HEART RATE: 105 BPM | TEMPERATURE: 98.4 F | WEIGHT: 153.8 LBS

## 2018-08-17 DIAGNOSIS — M54.2 CERVICAL PAIN (NECK): Primary | ICD-10-CM

## 2018-08-17 DIAGNOSIS — M54.2 CERVICAL PAIN (NECK): ICD-10-CM

## 2018-08-17 PROCEDURE — 4004F PT TOBACCO SCREEN RCVD TLK: CPT | Performed by: NURSE PRACTITIONER

## 2018-08-17 PROCEDURE — G8417 CALC BMI ABV UP PARAM F/U: HCPCS | Performed by: NURSE PRACTITIONER

## 2018-08-17 PROCEDURE — 72040 X-RAY EXAM NECK SPINE 2-3 VW: CPT

## 2018-08-17 PROCEDURE — 3017F COLORECTAL CA SCREEN DOC REV: CPT | Performed by: NURSE PRACTITIONER

## 2018-08-17 PROCEDURE — 99213 OFFICE O/P EST LOW 20 MIN: CPT | Performed by: NURSE PRACTITIONER

## 2018-08-17 PROCEDURE — G8427 DOCREV CUR MEDS BY ELIG CLIN: HCPCS | Performed by: NURSE PRACTITIONER

## 2018-08-17 RX ORDER — MELOXICAM 7.5 MG/1
7.5 TABLET ORAL DAILY
Qty: 30 TABLET | Refills: 3 | Status: SHIPPED | OUTPATIENT
Start: 2018-08-17 | End: 2018-10-13 | Stop reason: ALTCHOICE

## 2018-08-17 RX ORDER — TIZANIDINE 4 MG/1
4 TABLET ORAL EVERY 6 HOURS PRN
Qty: 60 TABLET | Refills: 3 | Status: SHIPPED | OUTPATIENT
Start: 2018-08-17 | End: 2018-09-26 | Stop reason: ALTCHOICE

## 2018-08-17 ASSESSMENT — ENCOUNTER SYMPTOMS
RESPIRATORY NEGATIVE: 1
GASTROINTESTINAL NEGATIVE: 1
EYES NEGATIVE: 1

## 2018-08-17 NOTE — PROGRESS NOTES
for weakness (right upper extremity) and numbness. Hematological: Negative. Psychiatric/Behavioral: Negative. Objective:     Physical Exam   Constitutional: She is oriented to person, place, and time. Vital signs are normal. She appears well-developed and well-nourished. No distress. HENT:   Head: Normocephalic and atraumatic. Eyes: Conjunctivae are normal. No scleral icterus. Cardiovascular: Normal rate, regular rhythm and normal heart sounds. No murmur heard. Pulmonary/Chest: Effort normal and breath sounds normal. No respiratory distress. She has no wheezes. Musculoskeletal: She exhibits no edema. Cervical back: She exhibits decreased range of motion, tenderness and bony tenderness. Neurological: She is alert and oriented to person, place, and time. Skin: Skin is warm. No rash noted. Psychiatric: Her mood appears not anxious. She does not exhibit a depressed mood. Nursing note and vitals reviewed. /80   Pulse 105   Temp 98.4 °F (36.9 °C)   Ht 5' 3\" (1.6 m)   Wt 153 lb 12.8 oz (69.8 kg)   SpO2 96%   BMI 27.24 kg/m²     Assessment:      Diagnosis Orders   1. Cervical pain (neck)         No results found for this visit on 08/17/18. Plan:     I am checking x-ray of the cervical spine. If this is abnormal, we will plan to order MRI of the cervical spine as discussed with the patient. She will also need referral to neurosurgery as discussed. Return for Keep follow up as scheduled. No orders of the defined types were placed in this encounter. Orders Placed This Encounter   Medications    tiZANidine (ZANAFLEX) 4 MG tablet     Sig: Take 1 tablet by mouth every 6 hours as needed (for headaches)     Dispense:  60 tablet     Refill:  3    meloxicam (MOBIC) 7.5 MG tablet     Sig: Take 1 tablet by mouth daily     Dispense:  30 tablet     Refill:  3        Patient given educational materials - see patient instructions.   Discussed use, benefit, and side

## 2018-08-17 NOTE — TELEPHONE ENCOUNTER
----- Message from JERILYN Li sent at 8/17/2018  3:20 PM CDT -----  Please let patient know that xray does show changes. I will order mri of cervical region.

## 2018-09-02 DIAGNOSIS — K21.9 GASTROESOPHAGEAL REFLUX DISEASE WITHOUT ESOPHAGITIS: ICD-10-CM

## 2018-09-03 RX ORDER — OMEPRAZOLE 40 MG/1
CAPSULE, DELAYED RELEASE ORAL
Qty: 30 CAPSULE | Refills: 11 | Status: SHIPPED | OUTPATIENT
Start: 2018-09-03 | End: 2019-08-06 | Stop reason: SDUPTHER

## 2018-09-04 ENCOUNTER — HOSPITAL ENCOUNTER (OUTPATIENT)
Dept: MRI IMAGING | Age: 53
Discharge: HOME | End: 2018-09-04
Payer: COMMERCIAL

## 2018-09-04 DIAGNOSIS — M48.02 NEUROFORAMINAL STENOSIS OF CERVICAL SPINE: Primary | ICD-10-CM

## 2018-09-04 DIAGNOSIS — M54.2 CERVICAL PAIN (NECK): ICD-10-CM

## 2018-09-04 PROCEDURE — 72141 MRI NECK SPINE W/O DYE: CPT

## 2018-09-05 ENCOUNTER — TELEPHONE (OUTPATIENT)
Dept: NEUROSURGERY | Age: 53
End: 2018-09-05

## 2018-09-05 ENCOUNTER — TELEPHONE (OUTPATIENT)
Dept: PRIMARY CARE CLINIC | Age: 53
End: 2018-09-05

## 2018-09-05 NOTE — TELEPHONE ENCOUNTER
Neurosurgical pre apt questionnaire     Referring physician? [id number] [facility]    Reason for referral?       NECK PAIN    Who is completing questionnaire? PATIENT     Has the pt had any previous spinal/brain surgeries? YES    If yes, Where was the surgery preformed? [facility]   What was the surgery? CERVICAL FUSION   Who was the surgeon? ELIZABETH   When was this surgery? 2016   Why is the pt not following up with previous surgeon? Is this a second opinion? Was a MRI preformed? YES    If not, Is there any reason a MRI cannot be performed? Is there metal anywhere in the body? If yes,  Where was the MRI preformed? [facility]   Patient agreed to bring disk? What part of the body? CERVICAL   When was it preformed? 9/4/18      Note:If  was not the facility that performed the study,    the disc will need to be brought appoint. Physical Therapy? NOT WITHIN THE LAST YEAR   If yes, where was PT completed? What is the duration of therapy? Pain Management? YES   If yes, where was pain mgt therapy? [facility]   Is the patient still under contract with pain mgt? YES   Who is the pain mgt physician? [facility]   Is the pt currently taking anything for pain control? INJECTIONS, PAIN MEDICATION     Employment Status ? UNEMPLOYED   What type of employment? Has the patient missed work due to pain? If unemployed, how long? Are you on disability? Symptoms?         NECK PAIN, ARM NUMBNESS, DROPPING OBJECTS

## 2018-09-05 NOTE — TELEPHONE ENCOUNTER
----- Message from JERILYN Prado sent at 9/4/2018  1:23 PM CDT -----  Please let patient know that MRI has returned. It did report that overall spinal alignment was normal.  There was no significant spinal stenosis, but it did show some narrowing at the c7-t1. We can refer to neurosurgery for their recommendations, but likely will be medical management.  Referral has been placed

## 2018-09-26 ENCOUNTER — OFFICE VISIT (OUTPATIENT)
Dept: NEUROSURGERY | Age: 53
End: 2018-09-26
Payer: COMMERCIAL

## 2018-09-26 VITALS
BODY MASS INDEX: 26.12 KG/M2 | OXYGEN SATURATION: 96 % | HEART RATE: 93 BPM | WEIGHT: 153 LBS | HEIGHT: 64 IN | SYSTOLIC BLOOD PRESSURE: 117 MMHG | DIASTOLIC BLOOD PRESSURE: 76 MMHG

## 2018-09-26 DIAGNOSIS — M48.02 FORAMINAL STENOSIS OF CERVICAL REGION: Primary | ICD-10-CM

## 2018-09-26 DIAGNOSIS — Z98.1 H/O CERVICAL SPINAL ARTHRODESIS: ICD-10-CM

## 2018-09-26 DIAGNOSIS — R20.0 NUMBNESS AND TINGLING IN LEFT HAND: ICD-10-CM

## 2018-09-26 DIAGNOSIS — M50.30 DDD (DEGENERATIVE DISC DISEASE), CERVICAL: ICD-10-CM

## 2018-09-26 DIAGNOSIS — R20.2 NUMBNESS AND TINGLING IN LEFT HAND: ICD-10-CM

## 2018-09-26 DIAGNOSIS — M54.2 NECK PAIN: ICD-10-CM

## 2018-09-26 DIAGNOSIS — M62.838 MUSCLE SPASMS OF NECK: ICD-10-CM

## 2018-09-26 DIAGNOSIS — R26.81 GAIT INSTABILITY: ICD-10-CM

## 2018-09-26 DIAGNOSIS — M79.602 LEFT ARM PAIN: ICD-10-CM

## 2018-09-26 PROCEDURE — G8417 CALC BMI ABV UP PARAM F/U: HCPCS | Performed by: NURSE PRACTITIONER

## 2018-09-26 PROCEDURE — 4004F PT TOBACCO SCREEN RCVD TLK: CPT | Performed by: NURSE PRACTITIONER

## 2018-09-26 PROCEDURE — 3017F COLORECTAL CA SCREEN DOC REV: CPT | Performed by: NURSE PRACTITIONER

## 2018-09-26 PROCEDURE — G8427 DOCREV CUR MEDS BY ELIG CLIN: HCPCS | Performed by: NURSE PRACTITIONER

## 2018-09-26 PROCEDURE — 99214 OFFICE O/P EST MOD 30 MIN: CPT | Performed by: NURSE PRACTITIONER

## 2018-09-26 RX ORDER — METHOCARBAMOL 750 MG/1
750 TABLET, FILM COATED ORAL 4 TIMES DAILY PRN
Qty: 90 TABLET | Refills: 1 | Status: SHIPPED | OUTPATIENT
Start: 2018-09-26 | End: 2018-10-26

## 2018-09-26 ASSESSMENT — ENCOUNTER SYMPTOMS
ORTHOPNEA: 0
DIARRHEA: 0
NAUSEA: 0
ABDOMINAL PAIN: 1
HEARTBURN: 1
VOMITING: 0
SHORTNESS OF BREATH: 1
PHOTOPHOBIA: 0
EYE DISCHARGE: 0
SINUS PAIN: 0
WHEEZING: 0
COUGH: 1
SPUTUM PRODUCTION: 0
CONSTIPATION: 0
EYE PAIN: 0
HEMOPTYSIS: 0
DOUBLE VISION: 0
BLOOD IN STOOL: 0
STRIDOR: 0
SORE THROAT: 0
EYE REDNESS: 0
BLURRED VISION: 1
BACK PAIN: 1

## 2018-09-26 NOTE — PROGRESS NOTES
spine is imaged from the posterior fossa through T1. Imaged portions of the cerebellum and brainstem are unremarkable. Normal alignment across the craniocervical junction. Alignment: Normal cervical lordosis is maintained. There is no   evidence of listhesis or subluxation. Marrow signal: No pathologic marrow infiltrate is demonstrated. Susceptibility artifact from prior C5-C7 anterior fusion. The   vertebral body heights and posterior elements are maintained. Cord/Canal: The spinal cord is normal in signal and morphology. Soft tissues: The surrounding soft tissues are unremarkable. Levels:    C2-C3: No disc herniation or significant disc bulging. No spinal   stenosis. Left-sided facet arthropathy results in moderate   neuroforaminal narrowing. No significant narrowing on the right. C3-C4: Mild diffuse disc bulging without significant spinal stenosis. Prominent right-sided facet arthropathy with severe neuroforaminal   narrowing. Only mild left-sided narrowing. C4-C5: Loss of disc height with mild diffuse disc bulging. No focal   herniation. No significant spinal stenosis. Bilateral facet   arthropathy with moderate bilateral neuroforaminal narrowing. C5-C6: No significant spinal stenosis. Facet arthropathy and   uncovertebral spurring result in moderate left-sided and mild   right-sided neuroforaminal narrowing. C6-C7: No significant spinal stenosis. Bilateral uncovertebral   spurring and facet arthropathy with moderate to severe left-sided   neuroforaminal narrowing with mild right-sided narrowing. C7-T1: No focal herniation or significant disc bulging. No significant   spinal stenosis. Bilateral facet arthropathy with moderate left-sided   and mild right-sided neuroforaminal narrowing.        Impression   1. Prior C5-C7 anterior fusion. Overall spinal alignment is normal.   2. There does not appear to be significant spinal stenosis. No   compressive myelopathy or myelomalacia.

## 2018-10-13 ENCOUNTER — HOSPITAL ENCOUNTER (EMERGENCY)
Age: 53
Discharge: HOME | End: 2018-10-13
Attending: EMERGENCY MEDICINE
Payer: COMMERCIAL

## 2018-10-13 ENCOUNTER — APPOINTMENT (OUTPATIENT)
Dept: GENERAL RADIOLOGY | Age: 53
End: 2018-10-13
Payer: COMMERCIAL

## 2018-10-13 VITALS
BODY MASS INDEX: 30.9 KG/M2 | WEIGHT: 180 LBS | RESPIRATION RATE: 18 BRPM | OXYGEN SATURATION: 92 % | DIASTOLIC BLOOD PRESSURE: 76 MMHG | HEART RATE: 90 BPM | TEMPERATURE: 99 F | SYSTOLIC BLOOD PRESSURE: 110 MMHG

## 2018-10-13 DIAGNOSIS — J18.9 PNEUMONIA DUE TO ORGANISM: Primary | ICD-10-CM

## 2018-10-13 DIAGNOSIS — R07.81 PLEURITIC CHEST PAIN: ICD-10-CM

## 2018-10-13 LAB
ALBUMIN SERPL-MCNC: 3.2 G/DL (ref 3.5–5.2)
ALP BLD-CCNC: 62 U/L (ref 35–104)
ALT SERPL-CCNC: 12 U/L (ref 5–33)
ANION GAP SERPL CALCULATED.3IONS-SCNC: 13 MMOL/L (ref 7–19)
AST SERPL-CCNC: 13 U/L (ref 5–32)
BASE EXCESS ARTERIAL: -5 MMOL/L (ref -2–2)
BASOPHILS ABSOLUTE: 0 K/UL (ref 0–0.2)
BASOPHILS RELATIVE PERCENT: 0.3 % (ref 0–1)
BILIRUB SERPL-MCNC: 0.4 MG/DL (ref 0.2–1.2)
BUN BLDV-MCNC: 9 MG/DL (ref 6–20)
CALCIUM SERPL-MCNC: 9.1 MG/DL (ref 8.6–10)
CARBOXYHEMOGLOBIN ARTERIAL: 2.5 % (ref 0–5)
CHLORIDE BLD-SCNC: 105 MMOL/L (ref 98–111)
CO2: 18 MMOL/L (ref 22–29)
CREAT SERPL-MCNC: 0.6 MG/DL (ref 0.5–0.9)
EOSINOPHILS ABSOLUTE: 0 K/UL (ref 0–0.6)
EOSINOPHILS RELATIVE PERCENT: 0.3 % (ref 0–5)
GFR NON-AFRICAN AMERICAN: >60
GLUCOSE BLD-MCNC: 98 MG/DL (ref 74–109)
HCO3 ARTERIAL: 17.7 MMOL/L (ref 22–26)
HCT VFR BLD CALC: 38.2 % (ref 37–47)
HEMOGLOBIN, ART, EXTENDED: 12.8 G/DL (ref 12–16)
HEMOGLOBIN: 12.5 G/DL (ref 12–16)
LYMPHOCYTES ABSOLUTE: 1.9 K/UL (ref 1.1–4.5)
LYMPHOCYTES RELATIVE PERCENT: 15.6 % (ref 20–40)
MCH RBC QN AUTO: 32.9 PG (ref 27–31)
MCHC RBC AUTO-ENTMCNC: 32.7 G/DL (ref 33–37)
MCV RBC AUTO: 100.5 FL (ref 81–99)
METHEMOGLOBIN ARTERIAL: 0.9 %
MONOCYTES ABSOLUTE: 1.5 K/UL (ref 0–0.9)
MONOCYTES RELATIVE PERCENT: 12.6 % (ref 0–10)
NEUTROPHILS ABSOLUTE: 8.4 K/UL (ref 1.5–7.5)
NEUTROPHILS RELATIVE PERCENT: 70.4 % (ref 50–65)
O2 CONTENT ARTERIAL: 16.7 ML/DL
O2 SAT, ARTERIAL: 92.4 %
O2 THERAPY: ABNORMAL
PCO2 ARTERIAL: 26 MMHG (ref 35–45)
PDW BLD-RTO: 12.4 % (ref 11.5–14.5)
PERFORMED ON: NORMAL
PH ARTERIAL: 7.44 (ref 7.35–7.45)
PLATELET # BLD: 260 K/UL (ref 130–400)
PMV BLD AUTO: 8.7 FL (ref 9.4–12.3)
PO2 ARTERIAL: 67 MMHG (ref 80–100)
POC TROPONIN I: 0.01 NG/ML (ref 0–0.08)
POTASSIUM REFLEX MAGNESIUM: 4.3 MMOL/L (ref 3.5–5)
POTASSIUM, WHOLE BLOOD: 3.5
RBC # BLD: 3.8 M/UL (ref 4.2–5.4)
SODIUM BLD-SCNC: 136 MMOL/L (ref 136–145)
TOTAL PROTEIN: 6.9 G/DL (ref 6.6–8.7)
TROPONIN: <0.01 NG/ML (ref 0–0.03)
WBC # BLD: 12 K/UL (ref 4.8–10.8)

## 2018-10-13 PROCEDURE — 80053 COMPREHEN METABOLIC PANEL: CPT

## 2018-10-13 PROCEDURE — 71046 X-RAY EXAM CHEST 2 VIEWS: CPT

## 2018-10-13 PROCEDURE — 96374 THER/PROPH/DIAG INJ IV PUSH: CPT

## 2018-10-13 PROCEDURE — 82803 BLOOD GASES ANY COMBINATION: CPT

## 2018-10-13 PROCEDURE — 99284 EMERGENCY DEPT VISIT MOD MDM: CPT

## 2018-10-13 PROCEDURE — 36415 COLL VENOUS BLD VENIPUNCTURE: CPT

## 2018-10-13 PROCEDURE — 84484 ASSAY OF TROPONIN QUANT: CPT

## 2018-10-13 PROCEDURE — 99285 EMERGENCY DEPT VISIT HI MDM: CPT | Performed by: EMERGENCY MEDICINE

## 2018-10-13 PROCEDURE — 6360000002 HC RX W HCPCS: Performed by: EMERGENCY MEDICINE

## 2018-10-13 PROCEDURE — 84132 ASSAY OF SERUM POTASSIUM: CPT

## 2018-10-13 PROCEDURE — 36600 WITHDRAWAL OF ARTERIAL BLOOD: CPT

## 2018-10-13 PROCEDURE — 93005 ELECTROCARDIOGRAM TRACING: CPT

## 2018-10-13 PROCEDURE — 85025 COMPLETE CBC W/AUTO DIFF WBC: CPT

## 2018-10-13 RX ORDER — LEVOFLOXACIN 500 MG/1
500 TABLET, FILM COATED ORAL DAILY
Qty: 10 TABLET | Refills: 0 | Status: SHIPPED | OUTPATIENT
Start: 2018-10-13 | End: 2018-10-23

## 2018-10-13 RX ORDER — KETOROLAC TROMETHAMINE 30 MG/ML
30 INJECTION, SOLUTION INTRAMUSCULAR; INTRAVENOUS ONCE
Status: COMPLETED | OUTPATIENT
Start: 2018-10-13 | End: 2018-10-13

## 2018-10-13 RX ADMIN — KETOROLAC TROMETHAMINE 30 MG: 30 INJECTION, SOLUTION INTRAMUSCULAR; INTRAVENOUS at 17:08

## 2018-10-13 ASSESSMENT — ENCOUNTER SYMPTOMS
SHORTNESS OF BREATH: 1
WHEEZING: 0
SORE THROAT: 0
SINUS CONGESTION: 0
RHINORRHEA: 0
COUGH: 1

## 2018-10-13 ASSESSMENT — PAIN SCALES - GENERAL
PAINLEVEL_OUTOF10: 10
PAINLEVEL_OUTOF10: 7
PAINLEVEL_OUTOF10: 10

## 2018-10-13 NOTE — ED PROVIDER NOTES
[facility] EMERGENCY DEPT  eMERGENCY dEPARTMENT eNCOUnter      Pt Name: Maria Isabel Ford  MRN: [id number]  [place] 1965  Date of evaluation: 10/13/2018  Provider: Tiffany Velazquez MD    [address] [place] [address]       Chief Complaint   Patient presents with    Chest Pain    Cough         HISTORY OF PRESENT ILLNESS   (Location/Symptom, Timing/Onset,Context/Setting, Quality, Duration, Modifying Factors, Severity)  Note limiting factors. Maria Isabel Ford is a 47 y.o. female who presents to the emergency department      The history is provided by the patient. Cough   Cough characteristics:  Non-productive  Severity:  Moderate  Onset quality:  Sudden  Duration:  1 day  Timing:  Intermittent  Chronicity:  New  Smoker: yes    Relieved by:  None tried  Worsened by:  Nothing  Ineffective treatments:  None tried  Associated symptoms: chest pain (pleuritic with cough) and shortness of breath    Associated symptoms: no chills, no diaphoresis, no fever, no headaches, no myalgias, no rhinorrhea, no sinus congestion, no sore throat and no wheezing        NursingNotes were reviewed. REVIEW OF SYSTEMS    (2-9 systems for level 4, 10 or more for level 5)     Review of Systems   Constitutional: Negative for chills, diaphoresis and fever. HENT: Negative for rhinorrhea and sore throat. Respiratory: Positive for cough and shortness of breath. Negative for wheezing. Cardiovascular: Positive for chest pain (pleuritic with cough). Musculoskeletal: Negative for myalgias. Neurological: Negative for headaches. All other systems reviewed and are negative. Except as noted above the remainder of the review of systems was reviewed and negative.        PAST MEDICAL HISTORY     Past Medical History:   Diagnosis Date    Anxiety     Arthritis     Chronic tension-type headache, intractable 1/8/2016    Depression     Headache(784.0)     migraine    Lupus     Neck pain 1/8/2016         SURGICALHISTORY       Past Surgical History: limits   TROPONIN   POTASSIUM, WHOLE BLOOD   POCT VENOUS       All other labs were within normal range or not returned as of this dictation. EMERGENCY DEPARTMENT COURSE and DIFFERENTIAL DIAGNOSIS/MDM:   Vitals:    Vitals:    10/13/18 1712 10/13/18 1720 10/13/18 1742 10/13/18 1801   BP: 126/79  117/71 110/76   Pulse:    90   Resp:    18   Temp:    99 °F (37.2 °C)   TempSrc:    Oral   SpO2: 95% 94% 94% 92%   Weight:               MDM  Number of Diagnoses or Management Options  Diagnosis management comments: ? Pneumonitis. Will cover with abx. Pt was not forthcoming that she gets 90 Percocet 7.5 per month, last filled 10/5. Drug-seeking behavior aspect to this visit. CRITICAL CARE TIME   Total Critical Care time was 0 minutes, excluding separately reportable procedures. There was a high probability of clinically significant/lifethreatening deterioration in the patient's condition which required my urgent intervention. CONSULTS:  None    PROCEDURES:  Unless otherwise noted below, none     Procedures    FINAL IMPRESSION      1. Pneumonia due to organism    2. Pleuritic chest pain          DISPOSITION/PLAN   DISPOSITION Decision To Discharge 10/13/2018 06:14:20 PM      PATIENT REFERRED TO:  Dany Holland MD  [facility] [address] [facility] [phone]    In 5 days  As needed. Home pain medicines as prescribed.       DISCHARGE MEDICATIONS:  Discharge Medication List as of 10/13/2018  6:16 PM      START taking these medications    Details   levofloxacin (LEVAQUIN) 500 MG tablet Take 1 tablet by mouth daily for 10 days, Disp-10 tablet, R-0Print      dextromethorphan-guaiFENesin (MUCINEX DM)  MG per extended release tablet Take 1 tablet by mouth every 12 hours as needed for Cough, Disp-20 tablet, R-0Print                (Please note that portions of this note were completed with a voice recognition program.  Efforts were made to edit the dictations but occasionally words are mis-transcribed.)    Ben Reeder MD (electronically signed)  Attending Emergency Physician          Ben Reeder MD  10/13/18 0845

## 2018-10-15 LAB
EKG P AXIS: 42 DEGREES
EKG P-R INTERVAL: 182 MS
EKG Q-T INTERVAL: 350 MS
EKG QRS DURATION: 84 MS
EKG QTC CALCULATION (BAZETT): 414 MS
EKG T AXIS: 51 DEGREES

## 2018-10-29 ENCOUNTER — TELEPHONE (OUTPATIENT)
Dept: PRIMARY CARE CLINIC | Age: 53
End: 2018-10-29

## 2018-10-29 ENCOUNTER — OFFICE VISIT (OUTPATIENT)
Dept: PRIMARY CARE CLINIC | Age: 53
End: 2018-10-29
Payer: COMMERCIAL

## 2018-10-29 ENCOUNTER — HOSPITAL ENCOUNTER (OUTPATIENT)
Dept: CT IMAGING | Age: 53
Discharge: HOME | End: 2018-10-29
Payer: COMMERCIAL

## 2018-10-29 VITALS
OXYGEN SATURATION: 98 % | BODY MASS INDEX: 27.93 KG/M2 | DIASTOLIC BLOOD PRESSURE: 70 MMHG | HEIGHT: 63 IN | SYSTOLIC BLOOD PRESSURE: 122 MMHG | HEART RATE: 88 BPM | TEMPERATURE: 97.1 F | WEIGHT: 157.6 LBS

## 2018-10-29 DIAGNOSIS — R06.02 SHORTNESS OF BREATH: ICD-10-CM

## 2018-10-29 DIAGNOSIS — R79.89 ELEVATED D-DIMER: Primary | ICD-10-CM

## 2018-10-29 DIAGNOSIS — M54.2 CHRONIC NECK PAIN: ICD-10-CM

## 2018-10-29 DIAGNOSIS — R04.2 HEMOPTYSIS: ICD-10-CM

## 2018-10-29 DIAGNOSIS — R05.9 COUGH: ICD-10-CM

## 2018-10-29 DIAGNOSIS — G89.29 CHRONIC NECK PAIN: ICD-10-CM

## 2018-10-29 DIAGNOSIS — R60.0 LOWER EXTREMITY EDEMA: ICD-10-CM

## 2018-10-29 DIAGNOSIS — R04.2 HEMOPTYSIS: Primary | ICD-10-CM

## 2018-10-29 LAB
ALBUMIN SERPL-MCNC: 3.5 G/DL (ref 3.5–5.2)
ALP BLD-CCNC: 125 U/L (ref 35–104)
ALT SERPL-CCNC: 9 U/L (ref 5–33)
ANION GAP SERPL CALCULATED.3IONS-SCNC: 16 MMOL/L (ref 7–19)
AST SERPL-CCNC: 14 U/L (ref 5–32)
BILIRUB SERPL-MCNC: <0.2 MG/DL (ref 0.2–1.2)
BUN BLDV-MCNC: 9 MG/DL (ref 6–20)
CALCIUM SERPL-MCNC: 9.5 MG/DL (ref 8.6–10)
CHLORIDE BLD-SCNC: 106 MMOL/L (ref 98–111)
CO2: 23 MMOL/L (ref 22–29)
CREAT SERPL-MCNC: 0.7 MG/DL (ref 0.5–0.9)
D DIMER: 2.04 UG/ML FEU (ref 0–0.48)
GFR NON-AFRICAN AMERICAN: >60
GLUCOSE BLD-MCNC: 80 MG/DL (ref 74–109)
HCT VFR BLD CALC: 39.4 % (ref 37–47)
HEMOGLOBIN: 12.5 G/DL (ref 12–16)
INR BLD: 0.91 (ref 0.88–1.18)
MCH RBC QN AUTO: 32.3 PG (ref 27–31)
MCHC RBC AUTO-ENTMCNC: 31.7 G/DL (ref 33–37)
MCV RBC AUTO: 101.8 FL (ref 81–99)
PDW BLD-RTO: 13.1 % (ref 11.5–14.5)
PLATELET # BLD: 587 K/UL (ref 130–400)
PMV BLD AUTO: 8.4 FL (ref 9.4–12.3)
POTASSIUM SERPL-SCNC: 4 MMOL/L (ref 3.5–5)
PROTHROMBIN TIME: 12.2 SEC (ref 12–14.6)
RBC # BLD: 3.87 M/UL (ref 4.2–5.4)
SODIUM BLD-SCNC: 145 MMOL/L (ref 136–145)
TOTAL PROTEIN: 7.6 G/DL (ref 6.6–8.7)
TSH SERPL DL<=0.05 MIU/L-ACNC: 2.65 UIU/ML (ref 0.27–4.2)
WBC # BLD: 8.6 K/UL (ref 4.8–10.8)

## 2018-10-29 PROCEDURE — 71250 CT THORAX DX C-: CPT

## 2018-10-29 PROCEDURE — 99214 OFFICE O/P EST MOD 30 MIN: CPT | Performed by: FAMILY MEDICINE

## 2018-10-29 PROCEDURE — 3017F COLORECTAL CA SCREEN DOC REV: CPT | Performed by: FAMILY MEDICINE

## 2018-10-29 PROCEDURE — G8484 FLU IMMUNIZE NO ADMIN: HCPCS | Performed by: FAMILY MEDICINE

## 2018-10-29 PROCEDURE — G8417 CALC BMI ABV UP PARAM F/U: HCPCS | Performed by: FAMILY MEDICINE

## 2018-10-29 PROCEDURE — G8427 DOCREV CUR MEDS BY ELIG CLIN: HCPCS | Performed by: FAMILY MEDICINE

## 2018-10-29 PROCEDURE — 4004F PT TOBACCO SCREEN RCVD TLK: CPT | Performed by: FAMILY MEDICINE

## 2018-10-29 RX ORDER — GABAPENTIN 600 MG/1
600 TABLET ORAL 3 TIMES DAILY
Qty: 90 TABLET | Refills: 1 | Status: SHIPPED | OUTPATIENT
Start: 2018-10-29 | End: 2019-09-13

## 2018-10-29 RX ORDER — GABAPENTIN 600 MG/1
600 TABLET ORAL 3 TIMES DAILY
Qty: 90 TABLET | Refills: 1 | Status: CANCELLED | OUTPATIENT
Start: 2018-10-29 | End: 2018-12-28

## 2018-10-29 ASSESSMENT — ENCOUNTER SYMPTOMS
COUGH: 1
COLOR CHANGE: 0
SHORTNESS OF BREATH: 1
HEMOPTYSIS: 1

## 2018-10-29 NOTE — TELEPHONE ENCOUNTER
----- Message from Cyndi Rondon MD sent at 10/29/2018  1:06 PM CDT -----  Please inform patient of abnormal test results. Elevated D-Dimer: Obtain CTA.

## 2018-10-29 NOTE — PROGRESS NOTES
Please inform patient of abnormal test results. 1. Walled off area in the right lower lobe without central cavitation,  suggestive of pneumonia. Short interval follow-up imaging should be  obtained to document resolution and exclude an underlying mass. 2. Noncalcified spiculated nodule in the left upper lobe measuring 9  mm for which follow-up CT or PET/CT should be considered in 3 months  per 2017 Fleischner criteria. 3. Emphysema.     Start Augmentin 875/125 mg 1 po BID, Azithromycin 250 (2 tabs day 1 then 1 tab for 4 days)

## 2018-10-30 ENCOUNTER — OFFICE VISIT (OUTPATIENT)
Dept: PRIMARY CARE CLINIC | Age: 53
End: 2018-10-30
Payer: COMMERCIAL

## 2018-10-30 ENCOUNTER — HOSPITAL ENCOUNTER (OUTPATIENT)
Dept: CT IMAGING | Age: 53
Discharge: HOME | End: 2018-10-30
Payer: COMMERCIAL

## 2018-10-30 VITALS
BODY MASS INDEX: 27.98 KG/M2 | SYSTOLIC BLOOD PRESSURE: 122 MMHG | DIASTOLIC BLOOD PRESSURE: 70 MMHG | OXYGEN SATURATION: 97 % | WEIGHT: 157.9 LBS | TEMPERATURE: 97 F | HEIGHT: 63 IN | HEART RATE: 80 BPM

## 2018-10-30 DIAGNOSIS — R79.89 ELEVATED D-DIMER: ICD-10-CM

## 2018-10-30 DIAGNOSIS — I26.99 PE (PULMONARY THROMBOEMBOLISM) (HCC): Primary | ICD-10-CM

## 2018-10-30 PROCEDURE — G8427 DOCREV CUR MEDS BY ELIG CLIN: HCPCS | Performed by: FAMILY MEDICINE

## 2018-10-30 PROCEDURE — 71275 CT ANGIOGRAPHY CHEST: CPT

## 2018-10-30 PROCEDURE — G8484 FLU IMMUNIZE NO ADMIN: HCPCS | Performed by: FAMILY MEDICINE

## 2018-10-30 PROCEDURE — 4004F PT TOBACCO SCREEN RCVD TLK: CPT | Performed by: FAMILY MEDICINE

## 2018-10-30 PROCEDURE — G8417 CALC BMI ABV UP PARAM F/U: HCPCS | Performed by: FAMILY MEDICINE

## 2018-10-30 PROCEDURE — 99214 OFFICE O/P EST MOD 30 MIN: CPT | Performed by: FAMILY MEDICINE

## 2018-10-30 PROCEDURE — 6360000004 HC RX CONTRAST MEDICATION: Performed by: FAMILY MEDICINE

## 2018-10-30 PROCEDURE — 3017F COLORECTAL CA SCREEN DOC REV: CPT | Performed by: FAMILY MEDICINE

## 2018-10-30 RX ADMIN — IOPAMIDOL 90 ML: 755 INJECTION, SOLUTION INTRAVENOUS at 08:53

## 2018-10-30 ASSESSMENT — ENCOUNTER SYMPTOMS
COUGH: 1
SHORTNESS OF BREATH: 1
HEMOPTYSIS: 1

## 2018-10-30 NOTE — PROGRESS NOTES
Shilo Patel is a 47 y.o. female    Chief Complaint   Patient presents with    Follow-up     CTA results     Shilo Patel presents to the office for follow up after CTA results. Patient was found to have new pulmonary embolus. Patient does have longstanding history of smoking. She complains of right sided chest pain. She is not having lower extremity edema. Cough   This is a new problem. The current episode started more than 1 month ago. The problem has been gradually worsening. The cough is productive of blood-tinged sputum and productive of bloody sputum. Associated symptoms include hemoptysis and shortness of breath. Pertinent negatives include no chest pain, chills, fever or rash. Nothing aggravates the symptoms. Risk factors for lung disease include smoking/tobacco exposure. Treatments tried: Seen in the ER 2 weeks ago and treated with levaquin. The treatment provided no relief. Acute pulmonary embolism of the descending branch of the   right pulmonary artery and evidence of right lower lobar posterior   segment pulmonary infarction. The previously seen small spiculated nodule is poorly visualized due   to poor lung expansion in this area/atelectatic changes. Review of Systems   Constitutional: Negative for chills and fever. Respiratory: Positive for cough, hemoptysis and shortness of breath. Hemotypsis     Cardiovascular: Negative for chest pain and leg swelling. Skin: Negative for rash. Prior to Admission medications    Medication Sig Start Date End Date Taking? Authorizing Provider   rivaroxaban (XARELTO STARTER PACK) 15 & 20 MG Starter Pack Use as directed 10/30/18  Yes Santiago Garsia MD   rivaroxaban (XARELTO) 20 MG TABS tablet Take 1 tablet by mouth daily (with breakfast) 10/30/18  Yes Santiago Garsia MD   gabapentin (NEURONTIN) 600 MG tablet Take 1 tablet by mouth 3 times daily for 60 days. . 10/29/18 12/28/18 Yes Santiago Garsia MD

## 2018-10-31 DIAGNOSIS — R51.9 NONINTRACTABLE HEADACHE, UNSPECIFIED CHRONICITY PATTERN, UNSPECIFIED HEADACHE TYPE: Primary | ICD-10-CM

## 2018-11-01 RX ORDER — TOPIRAMATE 50 MG/1
TABLET, FILM COATED ORAL
Qty: 60 TABLET | Refills: 3 | Status: SHIPPED | OUTPATIENT
Start: 2018-11-01 | End: 2019-08-06 | Stop reason: SDUPTHER

## 2018-11-05 ENCOUNTER — OFFICE VISIT (OUTPATIENT)
Dept: PRIMARY CARE CLINIC | Age: 53
End: 2018-11-05
Payer: COMMERCIAL

## 2018-11-05 ENCOUNTER — HOSPITAL ENCOUNTER (OUTPATIENT)
Dept: NON INVASIVE DIAGNOSTICS | Age: 53
Discharge: HOME | End: 2018-11-05
Payer: COMMERCIAL

## 2018-11-05 ENCOUNTER — CARE COORDINATION (OUTPATIENT)
Dept: CARE COORDINATION | Age: 53
End: 2018-11-05

## 2018-11-05 VITALS
BODY MASS INDEX: 28.42 KG/M2 | OXYGEN SATURATION: 90 % | WEIGHT: 160.4 LBS | HEART RATE: 110 BPM | SYSTOLIC BLOOD PRESSURE: 118 MMHG | HEIGHT: 63 IN | DIASTOLIC BLOOD PRESSURE: 70 MMHG | TEMPERATURE: 97.6 F

## 2018-11-05 DIAGNOSIS — M79.605 BILATERAL LEG PAIN: ICD-10-CM

## 2018-11-05 DIAGNOSIS — I26.99 PE (PULMONARY THROMBOEMBOLISM) (HCC): Primary | ICD-10-CM

## 2018-11-05 DIAGNOSIS — M79.604 BILATERAL LEG PAIN: ICD-10-CM

## 2018-11-05 DIAGNOSIS — I26.99 PE (PULMONARY THROMBOEMBOLISM) (HCC): ICD-10-CM

## 2018-11-05 PROCEDURE — 3017F COLORECTAL CA SCREEN DOC REV: CPT | Performed by: FAMILY MEDICINE

## 2018-11-05 PROCEDURE — 93970 EXTREMITY STUDY: CPT

## 2018-11-05 PROCEDURE — G8484 FLU IMMUNIZE NO ADMIN: HCPCS | Performed by: FAMILY MEDICINE

## 2018-11-05 PROCEDURE — G8417 CALC BMI ABV UP PARAM F/U: HCPCS | Performed by: FAMILY MEDICINE

## 2018-11-05 PROCEDURE — 99213 OFFICE O/P EST LOW 20 MIN: CPT | Performed by: FAMILY MEDICINE

## 2018-11-05 PROCEDURE — 4004F PT TOBACCO SCREEN RCVD TLK: CPT | Performed by: FAMILY MEDICINE

## 2018-11-05 PROCEDURE — G8427 DOCREV CUR MEDS BY ELIG CLIN: HCPCS | Performed by: FAMILY MEDICINE

## 2018-11-05 ASSESSMENT — ENCOUNTER SYMPTOMS
COUGH: 0
WHEEZING: 0
SHORTNESS OF BREATH: 0

## 2018-11-05 NOTE — PROGRESS NOTES
oriented to person, place, and time. Vital signs are normal. She appears well-developed and well-nourished. No distress. HENT:   Head: Normocephalic and atraumatic. Eyes: Conjunctivae are normal. No scleral icterus. Cardiovascular: Normal rate, regular rhythm and normal heart sounds. No murmur heard. Pulmonary/Chest: Effort normal and breath sounds normal. No respiratory distress. She has no wheezes. Musculoskeletal: She exhibits no edema. Cervical back: She exhibits decreased range of motion, tenderness and bony tenderness. Tenderness with bilateral calf squeeze. Neurological: She is alert and oriented to person, place, and time. Skin: Skin is warm. No rash noted. Psychiatric: Her mood appears not anxious. She does not exhibit a depressed mood. Nursing note and vitals reviewed. Assessment    ICD-10-CM    1. PE (pulmonary thromboembolism) (HCC) I26.99 VL EXTREMITY VENOUS BILATERAL   2. Bilateral leg pain M79.604 VL EXTREMITY VENOUS BILATERAL    M79.605            Plan    No orders of the defined types were placed in this encounter. Orders Placed This Encounter   Procedures    VL EXTREMITY VENOUS BILATERAL        Return in about 1 month (around 12/5/2018). There are no Patient Instructions on file for this visit.

## 2018-11-06 ENCOUNTER — TELEPHONE (OUTPATIENT)
Dept: PRIMARY CARE CLINIC | Age: 53
End: 2018-11-06

## 2018-11-06 NOTE — TELEPHONE ENCOUNTER
This [facility] called and gave the patients their US results. Patient stated understanding.

## 2018-11-28 NOTE — TELEPHONE ENCOUNTER
Patient called with request for refill xarelto.  Verified dosing and informed has already been sent to pharmacy and on file

## 2018-11-29 ENCOUNTER — OFFICE VISIT (OUTPATIENT)
Dept: NEUROSURGERY | Age: 53
End: 2018-11-29
Payer: COMMERCIAL

## 2018-11-29 VITALS
HEIGHT: 63 IN | OXYGEN SATURATION: 98 % | BODY MASS INDEX: 29.77 KG/M2 | DIASTOLIC BLOOD PRESSURE: 72 MMHG | SYSTOLIC BLOOD PRESSURE: 106 MMHG | WEIGHT: 168 LBS | HEART RATE: 90 BPM

## 2018-11-29 DIAGNOSIS — R20.2 NUMBNESS AND TINGLING IN LEFT HAND: ICD-10-CM

## 2018-11-29 DIAGNOSIS — M54.2 NECK PAIN: ICD-10-CM

## 2018-11-29 DIAGNOSIS — M50.30 DDD (DEGENERATIVE DISC DISEASE), CERVICAL: ICD-10-CM

## 2018-11-29 DIAGNOSIS — Z98.1 H/O CERVICAL SPINAL ARTHRODESIS: ICD-10-CM

## 2018-11-29 DIAGNOSIS — M48.02 FORAMINAL STENOSIS OF CERVICAL REGION: Primary | ICD-10-CM

## 2018-11-29 DIAGNOSIS — M62.838 MUSCLE SPASMS OF NECK: ICD-10-CM

## 2018-11-29 DIAGNOSIS — M79.602 LEFT ARM PAIN: ICD-10-CM

## 2018-11-29 DIAGNOSIS — R20.0 NUMBNESS AND TINGLING IN LEFT HAND: ICD-10-CM

## 2018-11-29 DIAGNOSIS — R26.81 GAIT INSTABILITY: ICD-10-CM

## 2018-11-29 PROCEDURE — 99213 OFFICE O/P EST LOW 20 MIN: CPT | Performed by: NEUROLOGICAL SURGERY

## 2018-11-29 PROCEDURE — G8484 FLU IMMUNIZE NO ADMIN: HCPCS | Performed by: NEUROLOGICAL SURGERY

## 2018-11-29 PROCEDURE — G8417 CALC BMI ABV UP PARAM F/U: HCPCS | Performed by: NEUROLOGICAL SURGERY

## 2018-11-29 PROCEDURE — 3017F COLORECTAL CA SCREEN DOC REV: CPT | Performed by: NEUROLOGICAL SURGERY

## 2018-11-29 PROCEDURE — 4004F PT TOBACCO SCREEN RCVD TLK: CPT | Performed by: NEUROLOGICAL SURGERY

## 2018-11-29 PROCEDURE — G8427 DOCREV CUR MEDS BY ELIG CLIN: HCPCS | Performed by: NEUROLOGICAL SURGERY

## 2018-11-29 NOTE — PROGRESS NOTES
tablet TAKE ONE TABLET BY MOUTH ONCE DAILY 90 tablet 3    propranolol (INDERAL) 10 MG tablet TAKE ONE TABLET BY MOUTH THREE TIMES DAILY 270 tablet 3    fluticasone (FLONASE) 50 MCG/ACT nasal spray USE 1 SPRAY(S) IN EACH NOSTRIL ONCE DAILY 1 Bottle 5     No current facility-administered medications for this visit. Allergies:  Ventolin [albuterol]    Social History:   History   Smoking Status    Current Every Day Smoker    Packs/day: 0.50    Years: 32.00    Types: Cigarettes, E-Cigarettes   Smokeless Tobacco    Never Used     History   Alcohol Use No         Family History:   Family History   Problem Relation Age of Onset    Colon Cancer Maternal Aunt     Colon Polyps Maternal Aunt     Liver Cancer Neg Hx     Liver Disease Neg Hx     Rectal Cancer Neg Hx     Stomach Cancer Neg Hx        REVIEW OF SYSTEMS:  ROS   Constitutional: Negative. HENT: Positive for tinnitus. Negative for congestion, ear discharge, ear pain, hearing loss, nosebleeds, sinus pain and sore throat. Eyes: Positive for blurred vision. Negative for double vision, photophobia, pain, discharge and redness. Respiratory: Positive for cough and shortness of breath. Negative for hemoptysis, sputum production, wheezing and stridor. Cardiovascular: Positive for claudication, leg swelling and PND. Negative for chest pain, palpitations and orthopnea. Gastrointestinal: Positive for abdominal pain and heartburn. Negative for blood in stool, constipation, diarrhea, melena, nausea and vomiting. Genitourinary: Negative. Negative for dysuria. Musculoskeletal: Positive for back pain, falls, joint pain, myalgias and neck pain. Skin: Negative. Neurological: Positive for tingling and headaches. Negative for dizziness, tremors, sensory change, speech change, focal weakness, seizures and loss of consciousness. Endo/Heme/Allergies: Negative. Psychiatric/Behavioral: Positive for depression and memory loss.  Negative for hallucinations, substance abuse and suicidal ideas. The patient is nervous/anxious. The patient does not have insomnia. Percocet 1 a day - \"all the time\"        PHYSICAL EXAM:  Vitals:    11/29/18 0936   BP: 106/72   Pulse: 90   SpO2: 98%     Constitutional: appears well-developed and well-nourished. Eyes - conjunctiva normal.  Pupils react to light  Ear, nose, throat -hearing intact to finger rub, No scars, masses, or lesions over external nose or ears, no atrophy of tongue  Neck-symmetric, no masses noted, no jugular vein distension  Respiration- chest wall appears symmetric, good expansion, normal effort without use of accessory muscles  Musculoskeletal - no significant wasting of muscles noted, no bony deformities, gait no gross ataxia  Extremities-no clubbing, cyanosis or edema  Skin - warm, dry, and intact. No rash, erythema, or pallor. Psychiatric - mood, affect, and behavior appear normal.     Neurologic Examination  Awake, Alert and oriented x 3  Normal speech pattern, following commands  Motor 4+/5 Right tricep, deltoid; pain limited   No deficits to light touch or pinprick sensation  Reflexes are 3+ and symmetric (could be due to SSRI)  Hoffmans sign bilaterally  No myofacial tenderness to palpation  Slow Gait pattern        DATA and IMAGING:    Nursing/pcp notes, imaging, labs, and vitals reviewed.      PT,OT and/or speech notes reviewed    Lab Results   Component Value Date    WBC 8.6 10/29/2018    HGB 12.5 10/29/2018    HCT 39.4 10/29/2018    .8 (H) 10/29/2018     (H) 10/29/2018     Lab Results   Component Value Date     10/29/2018    K 4.0 10/29/2018     10/29/2018    CO2 23 10/29/2018    BUN 9 10/29/2018    CREATININE 0.7 10/29/2018    GLUCOSE 80 10/29/2018    CALCIUM 9.5 10/29/2018    PROT 7.6 10/29/2018    LABALBU 3.5 10/29/2018    BILITOT <0.2 10/29/2018    ALKPHOS 125 (H) 10/29/2018    AST 14 10/29/2018    ALT 9 10/29/2018    LABGLOM >60 10/29/2018    AGRATIO

## 2018-12-10 ENCOUNTER — OFFICE VISIT (OUTPATIENT)
Dept: PRIMARY CARE CLINIC | Age: 53
End: 2018-12-10
Payer: COMMERCIAL

## 2018-12-10 VITALS
SYSTOLIC BLOOD PRESSURE: 110 MMHG | DIASTOLIC BLOOD PRESSURE: 68 MMHG | HEIGHT: 63 IN | HEART RATE: 90 BPM | OXYGEN SATURATION: 97 % | WEIGHT: 164.6 LBS | TEMPERATURE: 98 F | BODY MASS INDEX: 29.16 KG/M2

## 2018-12-10 DIAGNOSIS — M50.90 CERVICAL DISC DISEASE: ICD-10-CM

## 2018-12-10 DIAGNOSIS — M48.02 NEUROFORAMINAL STENOSIS OF CERVICAL SPINE: Primary | ICD-10-CM

## 2018-12-10 DIAGNOSIS — I26.99 PE (PULMONARY THROMBOEMBOLISM) (HCC): ICD-10-CM

## 2018-12-10 DIAGNOSIS — M54.2 CERVICAL PAIN (NECK): ICD-10-CM

## 2018-12-10 DIAGNOSIS — R41.0 CONFUSION: ICD-10-CM

## 2018-12-10 PROCEDURE — 99214 OFFICE O/P EST MOD 30 MIN: CPT | Performed by: FAMILY MEDICINE

## 2018-12-10 PROCEDURE — 3017F COLORECTAL CA SCREEN DOC REV: CPT | Performed by: FAMILY MEDICINE

## 2018-12-10 PROCEDURE — G8417 CALC BMI ABV UP PARAM F/U: HCPCS | Performed by: FAMILY MEDICINE

## 2018-12-10 PROCEDURE — 4004F PT TOBACCO SCREEN RCVD TLK: CPT | Performed by: FAMILY MEDICINE

## 2018-12-10 PROCEDURE — G8427 DOCREV CUR MEDS BY ELIG CLIN: HCPCS | Performed by: FAMILY MEDICINE

## 2018-12-10 PROCEDURE — G8484 FLU IMMUNIZE NO ADMIN: HCPCS | Performed by: FAMILY MEDICINE

## 2018-12-10 RX ORDER — METHOCARBAMOL 750 MG/1
TABLET, FILM COATED ORAL
COMMUNITY
Start: 2018-12-03 | End: 2021-02-02

## 2018-12-10 RX ORDER — HYDROCODONE BITARTRATE AND ACETAMINOPHEN 10; 325 MG/1; MG/1
TABLET ORAL
COMMUNITY
Start: 2018-12-03 | End: 2021-02-02

## 2018-12-10 ASSESSMENT — ENCOUNTER SYMPTOMS
COLOR CHANGE: 0
COUGH: 1
SHORTNESS OF BREATH: 0

## 2018-12-10 NOTE — PROGRESS NOTES
Apollo Rossi is a 47 y.o. female    Chief Complaint   Patient presents with    Follow-up     1 month       HPI  Apollo Rossi presents to the office for follow up of Pulmonary embolus. She is currently doing better. Patient states her chest pain has improved slightly. She is having signficant social stressors at home related to living situations. She is on xarelto 20 mg po daily. Patient denies any recent bleeding. Review of Systems   Constitutional: Negative for chills and fever. Respiratory: Positive for cough. Negative for shortness of breath. Cardiovascular: Negative for chest pain and leg swelling. Skin: Negative for color change and rash. Prior to Admission medications    Medication Sig Start Date End Date Taking? Authorizing Provider   methocarbamol (ROBAXIN) 750 MG tablet  12/3/18  Yes Historical Provider, MD   HYDROcodone-acetaminophen [facility])  MG per tablet  12/3/18  Yes Historical Provider, MD   topiramate (TOPAMAX) 50 MG tablet TAKE 1 TABLET BY MOUTH TWICE DAILY 11/1/18 12/10/18 Yes Jojo Davison MD   rivaroxaban (XARELTO) 20 MG TABS tablet Take 1 tablet by mouth daily (with breakfast) 10/30/18  Yes Jojo Davison MD   gabapentin (NEURONTIN) 600 MG tablet Take 1 tablet by mouth 3 times daily for 60 days. . 10/29/18 12/28/18 Yes Jojo Davison MD   dextromethorphan-guaiFENesin [facility] DM)  MG per extended release tablet Take 1 tablet by mouth every 12 hours as needed for Cough 10/13/18  Yes Lisa Oliva MD   omeprazole (PRILOSEC) 40 MG delayed release capsule TAKE ONE CAPSULE BY MOUTH ONCE DAILY 9/3/18  Yes JERILYN Silverman   SUMAtriptan (IMITREX) 25 MG tablet Take 1 tablet by mouth once as needed for Migraine 8/6/18 12/10/18 Yes Jojo Davison MD   amitriptyline (ELAVIL) 100 MG tablet TAKE ONE TABLET BY MOUTH NIGHTLY.  5/30/18  Yes Jojo Davison MD   citalopram (CELEXA) 40 MG tablet TAKE ONE TABLET BY MOUTH ONCE DAILY 5/30/18  Yes Sherry Felix MD   propranolol (INDERAL) 10 MG tablet TAKE ONE TABLET BY MOUTH THREE TIMES DAILY 5/30/18  Yes Sherry Felix MD   fluticasone Donna Bunde) 50 MCG/ACT nasal spray USE 1 SPRAY(S) IN EACH NOSTRIL ONCE DAILY 5/7/18  Yes Raji Clifford, APRN       Past Medical History:   Diagnosis Date    Anxiety     Arthritis     Chronic tension-type headache, intractable 1/8/2016    Depression     Headache(784.0)     migraine    Lupus     Neck pain 1/8/2016       Past Surgical History:   Procedure Laterality Date    COLONOSCOPY  10/23/2013    Dr Andrea Cazares: internal hemorrhoids; hyperplastic polyps (5 yr recal for screening)    HYSTERECTOMY      NECK SURGERY  07/26/2016    Dr Jordy Mcdaniel FLX DX W/COLLJ SPEC WHEN PFRMD N/A 2/6/2017    COLONOSCOPY DIAGNOSTIC OR SCREENING performed by Abundio Joseph DO at [address] [facility] OR    [place] EGD TRANSORAL BIOPSY SINGLE/MULTIPLE N/A 7/26/2018    Dr Roland/dilation over wire-51 [place]-mild esophageal narrowing-Yas (-)    TUBAL LIGATION      TUMOR REMOVAL      right deltoid area    UPPER GASTROINTESTINAL ENDOSCOPY  11/06/2013    Dr Andrea Cazares: normal    UPPER GASTROINTESTINAL ENDOSCOPY  7/26/2018    Dr Roland/dilation over wire-51 [place]-mild esophageal narrowing-Yas (-)       Social History     Social History    Marital status: Single     Spouse name: N/A    Number of children: N/A    Years of education: N/A     Social History Main Topics    Smoking status: Current Every Day Smoker     Packs/day: 0.50     Years: 32.00     Types: Cigarettes, E-Cigarettes    Smokeless tobacco: Never Used    Alcohol use No    Drug use: No    Sexual activity: Not Asked     Other Topics Concern    None     Social History Narrative    None       Physical Exam   Constitutional: She is oriented to person, place, and time. Vital signs are normal. She appears well-developed and well-nourished. No distress. HENT:   Head: Normocephalic and atraumatic.    Eyes: Conjunctivae are normal. No scleral icterus. Cardiovascular: Normal rate, regular rhythm and normal heart sounds. No murmur heard. Pulmonary/Chest: Effort normal and breath sounds normal. No respiratory distress. She has no wheezes. Musculoskeletal: She exhibits no edema. Cervical back: She exhibits decreased range of motion, tenderness and bony tenderness. Neurological: She is alert and oriented to person, place, and time. Skin: Skin is warm. No rash noted. Psychiatric: Her mood appears not anxious. She does not exhibit a depressed mood. Nursing note and vitals reviewed. Assessment    ICD-10-CM    1. Neuroforaminal stenosis of cervical spine M99.81 Continue to see Dr. Calvin Funk. No surgery at this time secondary to being on blood thinners. 2. PE (pulmonary thromboembolism) (HCC) I26.99 Continue Xarelto 20 mg po daily. 3. Cervical pain (neck) M54.2 Stable. 4. Cervical disc disease M50.90    5. Confusion R41.0 Will monitor closely. Symptoms have resolved currently. Plan    No orders of the defined types were placed in this encounter. No orders of the defined types were placed in this encounter. Return in about 3 months (around 3/10/2019) for DDD, Cervical Spine. There are no Patient Instructions on file for this visit.

## 2019-08-06 ENCOUNTER — OFFICE VISIT (OUTPATIENT)
Dept: PRIMARY CARE CLINIC | Age: 54
End: 2019-08-06
Payer: COMMERCIAL

## 2019-08-06 VITALS
SYSTOLIC BLOOD PRESSURE: 136 MMHG | OXYGEN SATURATION: 94 % | DIASTOLIC BLOOD PRESSURE: 86 MMHG | TEMPERATURE: 97.2 F | WEIGHT: 155 LBS | BODY MASS INDEX: 27.46 KG/M2 | HEART RATE: 117 BPM | HEIGHT: 63 IN

## 2019-08-06 DIAGNOSIS — Z76.89 ENCOUNTER TO ESTABLISH CARE: Primary | ICD-10-CM

## 2019-08-06 DIAGNOSIS — Z72.0 TOBACCO ABUSE: ICD-10-CM

## 2019-08-06 DIAGNOSIS — F41.9 ANXIETY: ICD-10-CM

## 2019-08-06 DIAGNOSIS — Z71.6 TOBACCO ABUSE COUNSELING: ICD-10-CM

## 2019-08-06 DIAGNOSIS — R51.9 NONINTRACTABLE HEADACHE, UNSPECIFIED CHRONICITY PATTERN, UNSPECIFIED HEADACHE TYPE: ICD-10-CM

## 2019-08-06 DIAGNOSIS — G89.4 CHRONIC PAIN SYNDROME: ICD-10-CM

## 2019-08-06 DIAGNOSIS — G89.29 CHRONIC NECK PAIN: ICD-10-CM

## 2019-08-06 DIAGNOSIS — R73.09 ELEVATED GLUCOSE: ICD-10-CM

## 2019-08-06 DIAGNOSIS — Z13.220 LIPID SCREENING: ICD-10-CM

## 2019-08-06 DIAGNOSIS — K21.9 GASTROESOPHAGEAL REFLUX DISEASE WITHOUT ESOPHAGITIS: ICD-10-CM

## 2019-08-06 DIAGNOSIS — M54.2 CHRONIC NECK PAIN: ICD-10-CM

## 2019-08-06 DIAGNOSIS — Z12.31 ENCOUNTER FOR SCREENING MAMMOGRAM FOR MALIGNANT NEOPLASM OF BREAST: ICD-10-CM

## 2019-08-06 DIAGNOSIS — M54.2 CERVICAL PAIN (NECK): ICD-10-CM

## 2019-08-06 DIAGNOSIS — Z79.899 DRUG THERAPY: ICD-10-CM

## 2019-08-06 LAB
AMPHETAMINE SCREEN, URINE: NORMAL
BARBITURATE SCREEN, URINE: NORMAL
BENZODIAZEPINE SCREEN, URINE: NORMAL
BUPRENORPHINE URINE: NORMAL
COCAINE METABOLITE SCREEN URINE: NORMAL
GABAPENTIN SCREEN, URINE: NORMAL
MDMA URINE: NORMAL
METHADONE SCREEN, URINE: NORMAL
METHAMPHETAMINE, URINE: NORMAL
OPIATE SCREEN URINE: NORMAL
OXYCODONE SCREEN URINE: NORMAL
PHENCYCLIDINE SCREEN URINE: NORMAL
PROPOXYPHENE SCREEN, URINE: NORMAL
THC SCREEN, URINE: NORMAL
TRICYCLIC ANTIDEPRESSANTS, UR: NORMAL

## 2019-08-06 PROCEDURE — 80305 DRUG TEST PRSMV DIR OPT OBS: CPT | Performed by: NURSE PRACTITIONER

## 2019-08-06 PROCEDURE — G8427 DOCREV CUR MEDS BY ELIG CLIN: HCPCS | Performed by: NURSE PRACTITIONER

## 2019-08-06 PROCEDURE — 4004F PT TOBACCO SCREEN RCVD TLK: CPT | Performed by: NURSE PRACTITIONER

## 2019-08-06 PROCEDURE — 99406 BEHAV CHNG SMOKING 3-10 MIN: CPT | Performed by: NURSE PRACTITIONER

## 2019-08-06 PROCEDURE — G8417 CALC BMI ABV UP PARAM F/U: HCPCS | Performed by: NURSE PRACTITIONER

## 2019-08-06 PROCEDURE — 3017F COLORECTAL CA SCREEN DOC REV: CPT | Performed by: NURSE PRACTITIONER

## 2019-08-06 PROCEDURE — 99214 OFFICE O/P EST MOD 30 MIN: CPT | Performed by: NURSE PRACTITIONER

## 2019-08-06 RX ORDER — GABAPENTIN 300 MG/1
CAPSULE ORAL
Qty: 90 CAPSULE | Refills: 0 | Status: CANCELLED | OUTPATIENT
Start: 2019-08-06 | End: 2019-09-06

## 2019-08-06 RX ORDER — BUSPIRONE HYDROCHLORIDE 10 MG/1
10 TABLET ORAL 2 TIMES DAILY
COMMUNITY
End: 2019-08-06 | Stop reason: SDUPTHER

## 2019-08-06 RX ORDER — TIZANIDINE 4 MG/1
4 TABLET ORAL 3 TIMES DAILY
Qty: 90 TABLET | Refills: 2 | Status: SHIPPED | OUTPATIENT
Start: 2019-08-06 | End: 2019-11-01 | Stop reason: SDUPTHER

## 2019-08-06 RX ORDER — TOPIRAMATE 50 MG/1
TABLET, FILM COATED ORAL
Qty: 60 TABLET | Refills: 5 | Status: SHIPPED | OUTPATIENT
Start: 2019-08-06 | End: 2020-01-15 | Stop reason: SDUPTHER

## 2019-08-06 RX ORDER — AMITRIPTYLINE HYDROCHLORIDE 100 MG/1
TABLET, FILM COATED ORAL
Qty: 90 TABLET | Refills: 1 | Status: SHIPPED | OUTPATIENT
Start: 2019-08-06 | End: 2020-01-15 | Stop reason: SDUPTHER

## 2019-08-06 RX ORDER — FLUTICASONE PROPIONATE 50 MCG
SPRAY, SUSPENSION (ML) NASAL
Qty: 1 BOTTLE | Refills: 5 | Status: SHIPPED | OUTPATIENT
Start: 2019-08-06 | End: 2020-01-15 | Stop reason: SDUPTHER

## 2019-08-06 RX ORDER — TIZANIDINE 4 MG/1
4 TABLET ORAL 3 TIMES DAILY
COMMUNITY
End: 2019-08-06 | Stop reason: SDUPTHER

## 2019-08-06 RX ORDER — BUSPIRONE HYDROCHLORIDE 10 MG/1
10 TABLET ORAL 2 TIMES DAILY
Qty: 60 TABLET | Refills: 2 | Status: SHIPPED | OUTPATIENT
Start: 2019-08-06 | End: 2020-01-02

## 2019-08-06 RX ORDER — CITALOPRAM 40 MG/1
TABLET ORAL
Qty: 90 TABLET | Refills: 1 | Status: SHIPPED | OUTPATIENT
Start: 2019-08-06 | End: 2019-09-05 | Stop reason: SDUPTHER

## 2019-08-06 RX ORDER — BUSPIRONE HYDROCHLORIDE 10 MG/1
10 TABLET ORAL 2 TIMES DAILY
Qty: 60 TABLET | Refills: 0 | Status: CANCELLED | OUTPATIENT
Start: 2019-08-06 | End: 2019-09-05

## 2019-08-06 RX ORDER — OMEPRAZOLE 40 MG/1
CAPSULE, DELAYED RELEASE ORAL
Qty: 30 CAPSULE | Refills: 5 | Status: SHIPPED | OUTPATIENT
Start: 2019-08-06 | End: 2020-01-15 | Stop reason: SDUPTHER

## 2019-08-06 RX ORDER — GABAPENTIN 300 MG/1
600 CAPSULE ORAL 3 TIMES DAILY
Qty: 180 CAPSULE | Refills: 1 | Status: ON HOLD | OUTPATIENT
Start: 2019-08-06 | End: 2021-05-28 | Stop reason: HOSPADM

## 2019-08-06 RX ORDER — PROPRANOLOL HYDROCHLORIDE 10 MG/1
TABLET ORAL
Qty: 270 TABLET | Refills: 3 | Status: SHIPPED | OUTPATIENT
Start: 2019-08-06 | End: 2020-01-15 | Stop reason: SDUPTHER

## 2019-08-06 NOTE — PROGRESS NOTES
[facility] PRIMARY [facility]ARE  [address] [address]  [address] [address]  Dept: [phone]  Dept Fax: [phone]  Loc: [phone]    Therese Booth is a 49 y.o. female who presents today for her medical conditions/complaints as noted below. Therese Booth is c/o of New Patient (Former Dr. Jessica Tran pt. - establish care); Numbness (ANSON Arms - for the last 6 months it has worsened ); Referral - General (Pt. request referral to Pain Mgmt. for neck and back pain - Dr. Mariella Best already established just needs an up to date referral ); and Rash (Pt. states she is getting red blotches that blister up. States she has \"Skin Lupus\" and wanted to know what you think this could be. )      Chief Complaint   Patient presents with   Akila Chapman Patient     Former Dr. Jessica Tran pt. - establish care    Numbness     ANSON Arms - for the last 6 months it has worsened     Referral - General     Pt. request referral to Pain Mgmt. for neck and back pain - Dr. Mariella Best already established just needs an up to date referral     Rash     Pt. states she is getting red blotches that blister up. States she has \"Skin Lupus\" and wanted to know what you think this could be. HPI:     HPI  Patient is here to reestablish care. She reports moving to [place] and then back tot  area. She was seeing Dr Mariella Best for pain management, but it has been too long and is needing another referral to his office. She is wanting our office to send in pain meds until establishing care with him. Patient has a known hx of PEs in the past and takes Xarelto for this. She has not been taking it since she returned back to [facility] for the past month. Patient has been taking Buspar for anxiety as well.      Past Medical History:   Diagnosis Date    Anxiety     Arthritis     Chronic tension-type headache, intractable 1/8/2016    Depression     Headache(784.0)     migraine    Lupus ([address]     Neck pain 1/8/2016        Past Surgical

## 2019-08-07 ASSESSMENT — ENCOUNTER SYMPTOMS
BACK PAIN: 1
EYES NEGATIVE: 1
RESPIRATORY NEGATIVE: 1
GASTROINTESTINAL NEGATIVE: 1

## 2019-08-09 ENCOUNTER — HOSPITAL ENCOUNTER (OUTPATIENT)
Dept: WOMENS IMAGING | Age: 54
Discharge: HOME | End: 2019-08-09
Payer: COMMERCIAL

## 2019-08-09 DIAGNOSIS — Z13.220 LIPID SCREENING: ICD-10-CM

## 2019-08-09 DIAGNOSIS — Z12.31 ENCOUNTER FOR SCREENING MAMMOGRAM FOR MALIGNANT NEOPLASM OF BREAST: ICD-10-CM

## 2019-08-09 DIAGNOSIS — R73.09 ELEVATED GLUCOSE: ICD-10-CM

## 2019-08-09 DIAGNOSIS — F41.9 ANXIETY: ICD-10-CM

## 2019-08-09 LAB
ALBUMIN SERPL-MCNC: 4.1 G/DL (ref 3.5–5.2)
ALP BLD-CCNC: 75 U/L (ref 35–104)
ALT SERPL-CCNC: 6 U/L (ref 5–33)
ANION GAP SERPL CALCULATED.3IONS-SCNC: 12 MMOL/L (ref 7–19)
AST SERPL-CCNC: 11 U/L (ref 5–32)
BILIRUB SERPL-MCNC: <0.2 MG/DL (ref 0.2–1.2)
BUN BLDV-MCNC: 12 MG/DL (ref 6–20)
CALCIUM SERPL-MCNC: 9 MG/DL (ref 8.6–10)
CHLORIDE BLD-SCNC: 110 MMOL/L (ref 98–111)
CHOLESTEROL, TOTAL: 170 MG/DL (ref 160–199)
CO2: 22 MMOL/L (ref 22–29)
CREAT SERPL-MCNC: 0.8 MG/DL (ref 0.5–0.9)
GFR NON-AFRICAN AMERICAN: >60
GLUCOSE BLD-MCNC: 96 MG/DL (ref 74–109)
HBA1C MFR BLD: 5.2 % (ref 4–6)
HCT VFR BLD CALC: 40.1 % (ref 37–47)
HDLC SERPL-MCNC: 52 MG/DL (ref 65–121)
HEMOGLOBIN: 12.4 G/DL (ref 12–16)
LDL CHOLESTEROL CALCULATED: 71 MG/DL
MCH RBC QN AUTO: 32.9 PG (ref 27–31)
MCHC RBC AUTO-ENTMCNC: 30.9 G/DL (ref 33–37)
MCV RBC AUTO: 106.4 FL (ref 81–99)
PDW BLD-RTO: 13.2 % (ref 11.5–14.5)
PLATELET # BLD: 309 K/UL (ref 130–400)
PMV BLD AUTO: 9.4 FL (ref 9.4–12.3)
POTASSIUM SERPL-SCNC: 4.2 MMOL/L (ref 3.5–5)
RBC # BLD: 3.77 M/UL (ref 4.2–5.4)
SODIUM BLD-SCNC: 144 MMOL/L (ref 136–145)
TOTAL PROTEIN: 7 G/DL (ref 6.6–8.7)
TRIGL SERPL-MCNC: 237 MG/DL (ref 0–149)
TSH SERPL DL<=0.05 MIU/L-ACNC: 1.24 UIU/ML (ref 0.27–4.2)
WBC # BLD: 7.3 K/UL (ref 4.8–10.8)

## 2019-08-09 PROCEDURE — 77063 BREAST TOMOSYNTHESIS BI: CPT

## 2019-08-14 ENCOUNTER — TELEPHONE (OUTPATIENT)
Dept: PRIMARY CARE CLINIC | Age: 54
End: 2019-08-14

## 2019-08-14 RX ORDER — SIMVASTATIN 10 MG
10 TABLET ORAL NIGHTLY
Qty: 90 TABLET | Refills: 1 | Status: SHIPPED | OUTPATIENT
Start: 2019-08-14 | End: 2020-02-28

## 2019-09-05 DIAGNOSIS — F41.9 ANXIETY: ICD-10-CM

## 2019-09-05 RX ORDER — CITALOPRAM 40 MG/1
TABLET ORAL
Qty: 90 TABLET | Refills: 1 | Status: SHIPPED | OUTPATIENT
Start: 2019-09-05 | End: 2020-02-28

## 2019-09-13 ENCOUNTER — OFFICE VISIT (OUTPATIENT)
Dept: PRIMARY CARE CLINIC | Age: 54
End: 2019-09-13
Payer: COMMERCIAL

## 2019-09-13 ENCOUNTER — HOSPITAL ENCOUNTER (OUTPATIENT)
Dept: GENERAL RADIOLOGY | Age: 54
Discharge: HOME | End: 2019-09-13
Payer: COMMERCIAL

## 2019-09-13 VITALS
SYSTOLIC BLOOD PRESSURE: 136 MMHG | RESPIRATION RATE: 17 BRPM | WEIGHT: 160 LBS | TEMPERATURE: 97.5 F | OXYGEN SATURATION: 98 % | HEIGHT: 63 IN | BODY MASS INDEX: 28.35 KG/M2 | HEART RATE: 76 BPM | DIASTOLIC BLOOD PRESSURE: 84 MMHG

## 2019-09-13 DIAGNOSIS — Z71.6 TOBACCO ABUSE COUNSELING: ICD-10-CM

## 2019-09-13 DIAGNOSIS — M54.12 CERVICAL RADICULOPATHY: Primary | ICD-10-CM

## 2019-09-13 DIAGNOSIS — M54.12 CERVICAL RADICULOPATHY: ICD-10-CM

## 2019-09-13 DIAGNOSIS — M54.50 LUMBAR BACK PAIN: ICD-10-CM

## 2019-09-13 DIAGNOSIS — G43.809 OTHER MIGRAINE WITHOUT STATUS MIGRAINOSUS, NOT INTRACTABLE: ICD-10-CM

## 2019-09-13 DIAGNOSIS — Z72.0 TOBACCO ABUSE: ICD-10-CM

## 2019-09-13 DIAGNOSIS — M50.90 CERVICAL DISC DISEASE: ICD-10-CM

## 2019-09-13 PROCEDURE — 72100 X-RAY EXAM L-S SPINE 2/3 VWS: CPT

## 2019-09-13 PROCEDURE — 3017F COLORECTAL CA SCREEN DOC REV: CPT | Performed by: NURSE PRACTITIONER

## 2019-09-13 PROCEDURE — 4004F PT TOBACCO SCREEN RCVD TLK: CPT | Performed by: NURSE PRACTITIONER

## 2019-09-13 PROCEDURE — 72040 X-RAY EXAM NECK SPINE 2-3 VW: CPT

## 2019-09-13 PROCEDURE — G8417 CALC BMI ABV UP PARAM F/U: HCPCS | Performed by: NURSE PRACTITIONER

## 2019-09-13 PROCEDURE — 99406 BEHAV CHNG SMOKING 3-10 MIN: CPT | Performed by: NURSE PRACTITIONER

## 2019-09-13 PROCEDURE — 99214 OFFICE O/P EST MOD 30 MIN: CPT | Performed by: NURSE PRACTITIONER

## 2019-09-13 PROCEDURE — G8427 DOCREV CUR MEDS BY ELIG CLIN: HCPCS | Performed by: NURSE PRACTITIONER

## 2019-09-13 RX ORDER — SUMATRIPTAN 25 MG/1
25 TABLET, FILM COATED ORAL
Qty: 15 TABLET | Refills: 0 | Status: SHIPPED | OUTPATIENT
Start: 2019-09-13 | End: 2021-02-02

## 2019-09-13 ASSESSMENT — ENCOUNTER SYMPTOMS
RESPIRATORY NEGATIVE: 1
EYES NEGATIVE: 1
GASTROINTESTINAL NEGATIVE: 1

## 2019-09-13 NOTE — PROGRESS NOTES
Use    Smoking status: Current Every Day Smoker     Packs/day: 0.50     Years: 32.00     Pack years: 16.00     Types: Cigarettes, E-Cigarettes    Smokeless tobacco: Never Used   Substance Use Topics    Alcohol use: No     Alcohol/week: 0.0 standard drinks        Current Outpatient Medications   Medication Sig Dispense Refill    diclofenac sodium 1 % GEL Apply 2 g topically 4 times daily 2 Tube 1    SUMAtriptan (IMITREX) 25 MG tablet Take 1 tablet by mouth once as needed for Migraine 15 tablet 0    citalopram (CELEXA) 40 MG tablet TAKE ONE TABLET BY MOUTH ONCE DAILY 90 tablet 1    simvastatin (ZOCOR) 10 MG tablet Take 1 tablet by mouth nightly 90 tablet 1    tiZANidine (ZANAFLEX) 4 MG tablet Take 1 tablet by mouth three times daily 90 tablet 2    busPIRone (BUSPAR) 10 MG tablet Take 1 tablet by mouth 2 times daily 60 tablet 2    omeprazole (PRILOSEC) 40 MG delayed release capsule TAKE ONE CAPSULE BY MOUTH ONCE DAILY 30 capsule 5    amitriptyline (ELAVIL) 100 MG tablet TAKE ONE TABLET BY MOUTH NIGHTLY. 90 tablet 1    propranolol (INDERAL) 10 MG tablet TAKE ONE TABLET BY MOUTH THREE TIMES DAILY 270 tablet 3    fluticasone (FLONASE) 50 MCG/ACT nasal spray USE 1 SPRAY(S) IN EACH NOSTRIL ONCE DAILY 1 Bottle 5    topiramate (TOPAMAX) 50 MG tablet TAKE 1 TABLET BY MOUTH TWICE DAILY 60 tablet 5    gabapentin (NEURONTIN) 300 MG capsule Take 2 capsules by mouth 3 times daily for 30 days. Intended supply: 30 days 180 capsule 1    methocarbamol (ROBAXIN) 750 MG tablet       HYDROcodone-acetaminophen (NORCO)  MG per tablet       dextromethorphan-guaiFENesin (MUCINEX DM)  MG per extended release tablet Take 1 tablet by mouth every 12 hours as needed for Cough 20 tablet 0     No current facility-administered medications for this visit.         Allergies   Allergen Reactions    Ventolin [Albuterol] Swelling       Family History   Problem Relation Age of Onset    Colon Cancer Maternal Aunt     Colon

## 2019-09-16 ENCOUNTER — TELEPHONE (OUTPATIENT)
Dept: PRIMARY CARE CLINIC | Age: 54
End: 2019-09-16

## 2019-09-18 ENCOUNTER — HOSPITAL ENCOUNTER (OUTPATIENT)
Dept: NEUROLOGY | Age: 54
Discharge: HOME | End: 2019-09-18
Payer: COMMERCIAL

## 2019-09-18 PROCEDURE — 95886 MUSC TEST DONE W/N TEST COMP: CPT | Performed by: PSYCHIATRY & NEUROLOGY

## 2019-09-18 PROCEDURE — 95886 MUSC TEST DONE W/N TEST COMP: CPT

## 2019-09-18 PROCEDURE — 95911 NRV CNDJ TEST 9-10 STUDIES: CPT

## 2019-09-18 PROCEDURE — 95911 NRV CNDJ TEST 9-10 STUDIES: CPT | Performed by: PSYCHIATRY & NEUROLOGY

## 2019-09-30 ENCOUNTER — TELEPHONE (OUTPATIENT)
Dept: PRIMARY CARE CLINIC | Age: 54
End: 2019-09-30

## 2019-10-03 ENCOUNTER — TELEPHONE (OUTPATIENT)
Dept: PRIMARY CARE CLINIC | Age: 54
End: 2019-10-03

## 2019-10-15 ENCOUNTER — OFFICE VISIT (OUTPATIENT)
Dept: PRIMARY CARE CLINIC | Age: 54
End: 2019-10-15
Payer: COMMERCIAL

## 2019-10-15 ENCOUNTER — TELEPHONE (OUTPATIENT)
Dept: NEUROSURGERY | Age: 54
End: 2019-10-15

## 2019-10-15 VITALS
TEMPERATURE: 97.5 F | OXYGEN SATURATION: 97 % | BODY MASS INDEX: 28.53 KG/M2 | SYSTOLIC BLOOD PRESSURE: 132 MMHG | WEIGHT: 161 LBS | DIASTOLIC BLOOD PRESSURE: 84 MMHG | RESPIRATION RATE: 16 BRPM | HEIGHT: 63 IN | HEART RATE: 90 BPM

## 2019-10-15 DIAGNOSIS — B00.1 COLD SORE: ICD-10-CM

## 2019-10-15 DIAGNOSIS — M54.12 CERVICAL RADICULOPATHY: Primary | ICD-10-CM

## 2019-10-15 DIAGNOSIS — Z98.1 S/P CERVICAL SPINAL FUSION: ICD-10-CM

## 2019-10-15 DIAGNOSIS — J02.9 PHARYNGITIS, UNSPECIFIED ETIOLOGY: ICD-10-CM

## 2019-10-15 PROCEDURE — G8417 CALC BMI ABV UP PARAM F/U: HCPCS | Performed by: NURSE PRACTITIONER

## 2019-10-15 PROCEDURE — 4004F PT TOBACCO SCREEN RCVD TLK: CPT | Performed by: NURSE PRACTITIONER

## 2019-10-15 PROCEDURE — G8427 DOCREV CUR MEDS BY ELIG CLIN: HCPCS | Performed by: NURSE PRACTITIONER

## 2019-10-15 PROCEDURE — G8484 FLU IMMUNIZE NO ADMIN: HCPCS | Performed by: NURSE PRACTITIONER

## 2019-10-15 PROCEDURE — 3017F COLORECTAL CA SCREEN DOC REV: CPT | Performed by: NURSE PRACTITIONER

## 2019-10-15 PROCEDURE — 99214 OFFICE O/P EST MOD 30 MIN: CPT | Performed by: NURSE PRACTITIONER

## 2019-10-15 RX ORDER — DOCOSANOL 100 MG/G
1 CREAM TOPICAL
Qty: 1 TUBE | Refills: 0 | COMMUNITY
Start: 2019-10-15 | End: 2019-10-25

## 2019-10-15 RX ORDER — AMOXICILLIN AND CLAVULANATE POTASSIUM 875; 125 MG/1; MG/1
1 TABLET, FILM COATED ORAL 2 TIMES DAILY
Qty: 20 TABLET | Refills: 0 | Status: SHIPPED | OUTPATIENT
Start: 2019-10-15 | End: 2019-10-25

## 2019-10-15 ASSESSMENT — ENCOUNTER SYMPTOMS
GASTROINTESTINAL NEGATIVE: 1
BACK PAIN: 1
RESPIRATORY NEGATIVE: 1
EYES NEGATIVE: 1
SORE THROAT: 1

## 2019-10-22 ENCOUNTER — OFFICE VISIT (OUTPATIENT)
Dept: NEUROSURGERY | Age: 54
End: 2019-10-22
Payer: COMMERCIAL

## 2019-10-22 VITALS
HEART RATE: 81 BPM | WEIGHT: 166 LBS | SYSTOLIC BLOOD PRESSURE: 113 MMHG | BODY MASS INDEX: 29.41 KG/M2 | HEIGHT: 63 IN | DIASTOLIC BLOOD PRESSURE: 73 MMHG

## 2019-10-22 DIAGNOSIS — R26.81 GAIT INSTABILITY: ICD-10-CM

## 2019-10-22 DIAGNOSIS — M48.02 FORAMINAL STENOSIS OF CERVICAL REGION: Primary | ICD-10-CM

## 2019-10-22 DIAGNOSIS — R29.898 FINE MOTOR IMPAIRMENT: ICD-10-CM

## 2019-10-22 DIAGNOSIS — R20.0 NUMBNESS AND TINGLING IN LEFT HAND: ICD-10-CM

## 2019-10-22 DIAGNOSIS — M50.30 DDD (DEGENERATIVE DISC DISEASE), CERVICAL: ICD-10-CM

## 2019-10-22 DIAGNOSIS — M54.2 NECK PAIN: ICD-10-CM

## 2019-10-22 DIAGNOSIS — M62.838 MUSCLE SPASMS OF NECK: ICD-10-CM

## 2019-10-22 DIAGNOSIS — Z98.1 H/O CERVICAL SPINAL ARTHRODESIS: ICD-10-CM

## 2019-10-22 DIAGNOSIS — M79.602 LEFT ARM PAIN: ICD-10-CM

## 2019-10-22 DIAGNOSIS — R20.2 NUMBNESS AND TINGLING IN LEFT HAND: ICD-10-CM

## 2019-10-22 DIAGNOSIS — R29.818 FINE MOTOR IMPAIRMENT: ICD-10-CM

## 2019-10-22 PROCEDURE — G8484 FLU IMMUNIZE NO ADMIN: HCPCS | Performed by: NURSE PRACTITIONER

## 2019-10-22 PROCEDURE — 4004F PT TOBACCO SCREEN RCVD TLK: CPT | Performed by: NURSE PRACTITIONER

## 2019-10-22 PROCEDURE — G8417 CALC BMI ABV UP PARAM F/U: HCPCS | Performed by: NURSE PRACTITIONER

## 2019-10-22 PROCEDURE — 99214 OFFICE O/P EST MOD 30 MIN: CPT | Performed by: NURSE PRACTITIONER

## 2019-10-22 PROCEDURE — 3017F COLORECTAL CA SCREEN DOC REV: CPT | Performed by: NURSE PRACTITIONER

## 2019-10-22 PROCEDURE — G8427 DOCREV CUR MEDS BY ELIG CLIN: HCPCS | Performed by: NURSE PRACTITIONER

## 2019-10-22 ASSESSMENT — ENCOUNTER SYMPTOMS
EYES NEGATIVE: 1
RESPIRATORY NEGATIVE: 1
GASTROINTESTINAL NEGATIVE: 1

## 2019-11-01 ENCOUNTER — HOSPITAL ENCOUNTER (OUTPATIENT)
Dept: MRI IMAGING | Age: 54
Discharge: HOME | End: 2019-11-01
Payer: COMMERCIAL

## 2019-11-01 DIAGNOSIS — M54.2 NECK PAIN: ICD-10-CM

## 2019-11-01 DIAGNOSIS — R20.2 NUMBNESS AND TINGLING IN LEFT HAND: ICD-10-CM

## 2019-11-01 DIAGNOSIS — R20.0 NUMBNESS AND TINGLING IN LEFT HAND: ICD-10-CM

## 2019-11-01 DIAGNOSIS — R29.898 FINE MOTOR IMPAIRMENT: ICD-10-CM

## 2019-11-01 DIAGNOSIS — M62.838 MUSCLE SPASMS OF NECK: ICD-10-CM

## 2019-11-01 DIAGNOSIS — R29.818 FINE MOTOR IMPAIRMENT: ICD-10-CM

## 2019-11-01 DIAGNOSIS — M50.30 DDD (DEGENERATIVE DISC DISEASE), CERVICAL: ICD-10-CM

## 2019-11-01 DIAGNOSIS — R26.81 GAIT INSTABILITY: ICD-10-CM

## 2019-11-01 DIAGNOSIS — M79.602 LEFT ARM PAIN: ICD-10-CM

## 2019-11-01 DIAGNOSIS — Z98.1 H/O CERVICAL SPINAL ARTHRODESIS: ICD-10-CM

## 2019-11-01 DIAGNOSIS — M48.02 FORAMINAL STENOSIS OF CERVICAL REGION: ICD-10-CM

## 2019-11-01 PROCEDURE — 6360000004 HC RX CONTRAST MEDICATION: Performed by: NURSE PRACTITIONER

## 2019-11-01 PROCEDURE — A9577 INJ MULTIHANCE: HCPCS | Performed by: NURSE PRACTITIONER

## 2019-11-01 PROCEDURE — 72156 MRI NECK SPINE W/O & W/DYE: CPT

## 2019-11-01 RX ORDER — TIZANIDINE 4 MG/1
4 TABLET ORAL 3 TIMES DAILY
Qty: 90 TABLET | Refills: 2 | Status: SHIPPED | OUTPATIENT
Start: 2019-11-01 | End: 2020-03-10 | Stop reason: SDUPTHER

## 2019-11-01 RX ADMIN — GADOBENATE DIMEGLUMINE 15 ML: 529 INJECTION, SOLUTION INTRAVENOUS at 09:41

## 2019-11-26 ENCOUNTER — OFFICE VISIT (OUTPATIENT)
Dept: NEUROSURGERY | Age: 54
End: 2019-11-26
Payer: COMMERCIAL

## 2019-11-26 VITALS
SYSTOLIC BLOOD PRESSURE: 120 MMHG | HEIGHT: 63 IN | WEIGHT: 180 LBS | DIASTOLIC BLOOD PRESSURE: 85 MMHG | HEART RATE: 110 BPM | BODY MASS INDEX: 31.89 KG/M2 | OXYGEN SATURATION: 98 %

## 2019-11-26 DIAGNOSIS — R20.2 NUMBNESS AND TINGLING IN LEFT HAND: ICD-10-CM

## 2019-11-26 DIAGNOSIS — M54.2 NECK PAIN: ICD-10-CM

## 2019-11-26 DIAGNOSIS — R26.81 GAIT INSTABILITY: ICD-10-CM

## 2019-11-26 DIAGNOSIS — R29.818 FINE MOTOR IMPAIRMENT: ICD-10-CM

## 2019-11-26 DIAGNOSIS — R29.898 FINE MOTOR IMPAIRMENT: ICD-10-CM

## 2019-11-26 DIAGNOSIS — Z98.1 H/O CERVICAL SPINAL ARTHRODESIS: ICD-10-CM

## 2019-11-26 DIAGNOSIS — M48.02 FORAMINAL STENOSIS OF CERVICAL REGION: Primary | ICD-10-CM

## 2019-11-26 DIAGNOSIS — R20.0 NUMBNESS AND TINGLING IN LEFT HAND: ICD-10-CM

## 2019-11-26 DIAGNOSIS — M50.30 DDD (DEGENERATIVE DISC DISEASE), CERVICAL: ICD-10-CM

## 2019-11-26 DIAGNOSIS — M79.601 BILATERAL ARM PAIN: ICD-10-CM

## 2019-11-26 DIAGNOSIS — M79.602 BILATERAL ARM PAIN: ICD-10-CM

## 2019-11-26 PROCEDURE — 4004F PT TOBACCO SCREEN RCVD TLK: CPT | Performed by: NEUROLOGICAL SURGERY

## 2019-11-26 PROCEDURE — G8484 FLU IMMUNIZE NO ADMIN: HCPCS | Performed by: NEUROLOGICAL SURGERY

## 2019-11-26 PROCEDURE — 99214 OFFICE O/P EST MOD 30 MIN: CPT | Performed by: NEUROLOGICAL SURGERY

## 2019-11-26 PROCEDURE — 3017F COLORECTAL CA SCREEN DOC REV: CPT | Performed by: NEUROLOGICAL SURGERY

## 2019-11-26 PROCEDURE — G8417 CALC BMI ABV UP PARAM F/U: HCPCS | Performed by: NEUROLOGICAL SURGERY

## 2019-11-26 PROCEDURE — G8427 DOCREV CUR MEDS BY ELIG CLIN: HCPCS | Performed by: NEUROLOGICAL SURGERY

## 2019-11-26 RX ORDER — GABAPENTIN 300 MG/1
CAPSULE ORAL
Refills: 5 | Status: ON HOLD | COMMUNITY
Start: 2019-11-01 | End: 2021-05-28 | Stop reason: HOSPADM

## 2019-11-26 RX ORDER — SUMATRIPTAN 25 MG/1
TABLET, FILM COATED ORAL
Refills: 0 | COMMUNITY
Start: 2019-11-01 | End: 2020-01-15 | Stop reason: SDUPTHER

## 2019-11-26 ASSESSMENT — ENCOUNTER SYMPTOMS
BACK PAIN: 1
EYES NEGATIVE: 1
GASTROINTESTINAL NEGATIVE: 1
RESPIRATORY NEGATIVE: 1

## 2020-01-02 RX ORDER — BUSPIRONE HYDROCHLORIDE 10 MG/1
TABLET ORAL
Qty: 60 TABLET | Refills: 0 | Status: SHIPPED | OUTPATIENT
Start: 2020-01-02 | End: 2020-01-15 | Stop reason: SDUPTHER

## 2020-01-15 ENCOUNTER — OFFICE VISIT (OUTPATIENT)
Dept: PRIMARY CARE CLINIC | Age: 55
End: 2020-01-15
Payer: COMMERCIAL

## 2020-01-15 VITALS
SYSTOLIC BLOOD PRESSURE: 132 MMHG | WEIGHT: 169 LBS | OXYGEN SATURATION: 98 % | HEART RATE: 61 BPM | TEMPERATURE: 97.5 F | DIASTOLIC BLOOD PRESSURE: 84 MMHG | HEIGHT: 66 IN | BODY MASS INDEX: 27.16 KG/M2 | RESPIRATION RATE: 17 BRPM

## 2020-01-15 PROCEDURE — 90686 IIV4 VACC NO PRSV 0.5 ML IM: CPT | Performed by: NURSE PRACTITIONER

## 2020-01-15 PROCEDURE — 90471 IMMUNIZATION ADMIN: CPT | Performed by: NURSE PRACTITIONER

## 2020-01-15 PROCEDURE — 99214 OFFICE O/P EST MOD 30 MIN: CPT | Performed by: NURSE PRACTITIONER

## 2020-01-15 RX ORDER — OMEPRAZOLE 40 MG/1
CAPSULE, DELAYED RELEASE ORAL
Qty: 30 CAPSULE | Refills: 5 | Status: SHIPPED | OUTPATIENT
Start: 2020-01-15 | End: 2021-02-02 | Stop reason: SDUPTHER

## 2020-01-15 RX ORDER — SUMATRIPTAN 25 MG/1
TABLET, FILM COATED ORAL
Qty: 30 TABLET | Refills: 0 | Status: SHIPPED | OUTPATIENT
Start: 2020-01-15 | End: 2020-02-28

## 2020-01-15 RX ORDER — AMITRIPTYLINE HYDROCHLORIDE 100 MG/1
TABLET, FILM COATED ORAL
Qty: 90 TABLET | Refills: 1 | Status: SHIPPED | OUTPATIENT
Start: 2020-01-15 | End: 2021-02-02 | Stop reason: SDUPTHER

## 2020-01-15 RX ORDER — BUSPIRONE HYDROCHLORIDE 10 MG/1
TABLET ORAL
Qty: 60 TABLET | Refills: 0 | Status: SHIPPED | OUTPATIENT
Start: 2020-01-15 | End: 2020-02-28

## 2020-01-15 RX ORDER — TOPIRAMATE 50 MG/1
TABLET, FILM COATED ORAL
Qty: 60 TABLET | Refills: 5 | Status: SHIPPED | OUTPATIENT
Start: 2020-01-15 | End: 2021-02-02 | Stop reason: SDUPTHER

## 2020-01-15 RX ORDER — FLUTICASONE PROPIONATE 50 MCG
SPRAY, SUSPENSION (ML) NASAL
Qty: 1 BOTTLE | Refills: 5 | Status: SHIPPED | OUTPATIENT
Start: 2020-01-15 | End: 2021-02-02 | Stop reason: SDUPTHER

## 2020-01-15 RX ORDER — ROPINIROLE 0.25 MG/1
0.25 TABLET, FILM COATED ORAL NIGHTLY
Qty: 30 TABLET | Refills: 3 | Status: SHIPPED | OUTPATIENT
Start: 2020-01-15 | End: 2020-03-30 | Stop reason: SDUPTHER

## 2020-01-15 RX ORDER — PROPRANOLOL HYDROCHLORIDE 10 MG/1
TABLET ORAL
Qty: 270 TABLET | Refills: 3 | Status: SHIPPED | OUTPATIENT
Start: 2020-01-15 | End: 2021-02-02 | Stop reason: SDUPTHER

## 2020-01-15 RX ORDER — GABAPENTIN 300 MG/1
CAPSULE ORAL
Qty: 90 CAPSULE | Refills: 0 | Status: CANCELLED | OUTPATIENT
Start: 2020-01-15 | End: 2020-02-14

## 2020-01-15 ASSESSMENT — ENCOUNTER SYMPTOMS
GASTROINTESTINAL NEGATIVE: 1
RESPIRATORY NEGATIVE: 1
EYES NEGATIVE: 1

## 2020-01-15 NOTE — PROGRESS NOTES
After obtaining consent, and per orders of JERILYN Munguia, an injection of Afluria was given in Left deltoid by [facility]. Patient tolerated well with no immediate adverse reaction. Patient was released from the office with no concerns.

## 2020-01-15 NOTE — PROGRESS NOTES
[facility] PRIMARY CARE  [address] [address]  [address] [address]  Dept: [phone]  Dept Fax: [phone]  Loc: [phone]    Evaristo Rooney is a 49 y.o. female who presents today for her medical conditions/complaints as noted below. Evaristo Rooney is c/o of Medication Refill (Requesting refills on all medication ); Referral - General (Pain Mangement told her to talk to [facility] about getting referral to  over a [facility] for her neck ); and Leg Pain (Pt. states she has ANSON leg pain and thinks she has RLS )      Chief Complaint   Patient presents with    Medication Refill     Requesting refills on all medication     Referral - General     Pain Mangement told her to talk to [facility] about getting referral to  over a [facility] for her neck     Leg Pain     Pt. states she has ANSON leg pain and thinks she has RLS        HPI:     HPI  Patient is here for follow-up on chronic conditions including anxiety, hypertension, depression, chronic back pain. Patient has recently seen Dr. Alexa Dong and would like to discuss referral to a different neurosurgeon. Patient is also complaining of restless leg symptoms throughout the night.   She has never had to take medication for restless legs in the past.    Past Medical History:   Diagnosis Date    Anxiety     Arthritis     Chronic tension-type headache, intractable 1/8/2016    Depression     Headache(784.0)     migraine    Lupus ([address]     Neck pain 1/8/2016        Past Surgical History:   Procedure Laterality Date    COLONOSCOPY  10/23/2013    Dr Zheng Roman: internal hemorrhoids; hyperplastic polyps (5 yr recal for screening)    HYSTERECTOMY      NECK SURGERY  07/26/2016    Dr Aung Valero FLX DX W/TARUN [place] 1978 PFRMD N/A 2/6/2017    COLONOSCOPY DIAGNOSTIC OR SCREENING performed by Gay Farr DO at [address] [facility] OR    [place] EGD TRANSORAL BIOPSY SINGLE/MULTIPLE N/A 7/26/2018    Dr Mandel-w/dilation over wire-51 [place]-mild esophageal narrowing-Yas (-)    TUBAL LIGATION      TUMOR REMOVAL      right deltoid area    UPPER GASTROINTESTINAL ENDOSCOPY  11/06/2013    Dr Alycia Castillo: normal    UPPER GASTROINTESTINAL ENDOSCOPY  7/26/2018    Dr Mandel-w/dilation over wire-51 French-mild esophageal narrowing-Yas (-)       Social History     Tobacco Use    Smoking status: Current Every Day Smoker     Packs/day: 0.50     Years: 32.00     Pack years: 16.00     Types: Cigarettes, E-Cigarettes    Smokeless tobacco: Never Used   Substance Use Topics    Alcohol use: No     Alcohol/week: 0.0 standard drinks        Current Outpatient Medications   Medication Sig Dispense Refill    busPIRone (BUSPAR) 10 MG tablet TAKE 1 TABLET BY MOUTH TWICE DAILY 60 tablet 0    SUMAtriptan (IMITREX) 25 MG tablet TAKE 1 TABLET BY MOUTH ONCE AS NEEDED FOR MIGRAINE HEADACHE 30 tablet 0    omeprazole (PRILOSEC) 40 MG delayed release capsule TAKE ONE CAPSULE BY MOUTH ONCE DAILY 30 capsule 5    amitriptyline (ELAVIL) 100 MG tablet TAKE ONE TABLET BY MOUTH NIGHTLY.  90 tablet 1    propranolol (INDERAL) 10 MG tablet TAKE ONE TABLET BY MOUTH THREE TIMES DAILY 270 tablet 3    fluticasone (FLONASE) 50 MCG/ACT nasal spray USE 1 SPRAY(S) IN EACH NOSTRIL ONCE DAILY 1 Bottle 5    topiramate (TOPAMAX) 50 MG tablet TAKE 1 TABLET BY MOUTH TWICE DAILY 60 tablet 5    zoster recombinant adjuvanted vaccine (SHINGRIX) 50 MCG/0.5ML SUSR injection Inject 0.5 mLs into the muscle See Admin Instructions 1 dose now and repeat in 2-6 months 0.5 mL 0    rOPINIRole (REQUIP) 0.25 MG tablet Take 1 tablet by mouth nightly 30 tablet 3    gabapentin (NEURONTIN) 300 MG capsule TAKE 2 CAPSULES BY MOUTH EVERY 12 HOURS  5    tiZANidine (ZANAFLEX) 4 MG tablet TAKE 1 TABLET BY MOUTH THREE TIMES DAILY 90 tablet 2    diclofenac sodium 1 % GEL Apply 2 g topically 4 times daily 2 Tube 1    citalopram (CELEXA) 40 MG tablet TAKE ONE TABLET BY MOUTH ONCE DAILY 90 tablet 1    simvastatin (ZOCOR) 10 MG tablet Take 1 tablet by mouth nightly 90 tablet 1    methocarbamol (ROBAXIN) 750 MG tablet       HYDROcodone-acetaminophen (NORCO)  MG per tablet       SUMAtriptan (IMITREX) 25 MG tablet Take 1 tablet by mouth once as needed for Migraine 15 tablet 0    gabapentin (NEURONTIN) 300 MG capsule Take 2 capsules by mouth 3 times daily for 30 days. Intended supply: 30 days 180 capsule 1     No current facility-administered medications for this visit. Allergies   Allergen Reactions    Ventolin [Albuterol] Swelling       Family History   Problem Relation Age of Onset    Colon Cancer Maternal Aunt     Colon Polyps Maternal Aunt     Liver Cancer Neg Hx     Liver Disease Neg Hx     Rectal Cancer Neg Hx     Stomach Cancer Neg Hx            Subjective:      Review of Systems   Constitutional: Negative. HENT: Negative. Eyes: Negative. Respiratory: Negative. Cardiovascular: Negative. Gastrointestinal: Negative. Endocrine: Negative. Genitourinary: Negative. Musculoskeletal: Positive for arthralgias and neck pain. Skin: Negative. Neurological: Positive for numbness and headaches. Hematological: Negative. Psychiatric/Behavioral: Negative. Objective:     Physical Exam  Vitals signs and nursing note reviewed. Constitutional:       General: She is not in acute distress. Appearance: She is well-developed. HENT:      Head: Normocephalic and atraumatic. Eyes:      General: No scleral icterus. Conjunctiva/sclera: Conjunctivae normal.   Cardiovascular:      Rate and Rhythm: Normal rate and regular rhythm. Heart sounds: Normal heart sounds. No murmur. Pulmonary:      Effort: Pulmonary effort is normal. No respiratory distress. Breath sounds: Normal breath sounds. No wheezing. Musculoskeletal:      Cervical back: She exhibits decreased range of motion, tenderness and bony tenderness.       Comments:       Skin:     General: Skin is warm. Findings: No rash. Neurological:      Mental Status: She is alert and oriented to person, place, and time. Sensory: Sensory deficit present. Psychiatric:         Mood and Affect: Mood is not anxious or depressed. /84   Pulse 61   Temp 97.5 °F (36.4 °C) (Temporal)   Resp 17   Ht 5' 6\" (1.676 m)   Wt 169 lb (76.7 kg)   SpO2 98%   BMI 27.28 kg/m²     Assessment:      Diagnosis Orders   1. Cervical radiculopathy  External Referral To Neurosurgery   2. Gastroesophageal reflux disease without esophagitis  omeprazole (PRILOSEC) 40 MG delayed release capsule   3. Anxiety  amitriptyline (ELAVIL) 100 MG tablet   4. Nonintractable headache, unspecified chronicity pattern, unspecified headache type  topiramate (TOPAMAX) 50 MG tablet       No results found for this visit on 01/15/20. Plan:     Refills sent to pharmacy. I am checking labs -we will call with these results. Referral to neurosurgery as discussed. Flu vaccine in office. Return in about 4 weeks (around 2/12/2020) for Annual Physical Exam with PAP.     Orders Placed This Encounter   Procedures    INFLUENZA, QUADV, 3 YRS AND OLDER, IM PF, PREFILL SYR OR SDV, 0.5ML (AFLURIA QUADV, PF)    External Referral To Neurosurgery     Referral Priority:   Routine     Referral Type:   Eval and Treat     Referral Reason:   Specialty Services Required     Requested Specialty:   Neurosurgery     Number of Visits Requested:   1       Orders Placed This Encounter   Medications    busPIRone (BUSPAR) 10 MG tablet     Sig: TAKE 1 TABLET BY MOUTH TWICE DAILY     Dispense:  60 tablet     Refill:  0    SUMAtriptan (IMITREX) 25 MG tablet     Sig: TAKE 1 TABLET BY MOUTH ONCE AS NEEDED FOR MIGRAINE HEADACHE     Dispense:  30 tablet     Refill:  0    omeprazole (PRILOSEC) 40 MG delayed release capsule     Sig: TAKE ONE CAPSULE BY MOUTH ONCE DAILY     Dispense:  30 capsule     Refill:  5     Please consider 90 day supplies to promote better adherence    amitriptyline (ELAVIL) 100 MG tablet     Sig: TAKE ONE TABLET BY MOUTH NIGHTLY. Dispense:  90 tablet     Refill:  1     Please consider 90 day supplies to promote better adherence    propranolol (INDERAL) 10 MG tablet     Sig: TAKE ONE TABLET BY MOUTH THREE TIMES DAILY     Dispense:  270 tablet     Refill:  3     Please consider 90 day supplies to promote better adherence    fluticasone (FLONASE) 50 MCG/ACT nasal spray     Sig: USE 1 SPRAY(S) IN EACH NOSTRIL ONCE DAILY     Dispense:  1 Bottle     Refill:  5     Please consider 90 day supplies to promote better adherence    topiramate (TOPAMAX) 50 MG tablet     Sig: TAKE 1 TABLET BY MOUTH TWICE DAILY     Dispense:  60 tablet     Refill:  5     Please consider 90 day supplies to promote better adherence    zoster recombinant adjuvanted vaccine (SHINGRIX) 50 MCG/0.5ML SUSR injection     Sig: Inject 0.5 mLs into the muscle See Admin Instructions 1 dose now and repeat in 2-6 months     Dispense:  0.5 mL     Refill:  0    rOPINIRole (REQUIP) 0.25 MG tablet     Sig: Take 1 tablet by mouth nightly     Dispense:  30 tablet     Refill:  3        Patient offered educational handouts and has had all questions answered. Patient voices understanding and agrees to plans along with risks and benefits of plan. Patient is instructed to continue prior meds, diet, and exercise plans as instructed. Patient agrees to follow up as instructed and sooner if needed. Patient agrees to go to ER if condition becomes emergent. EMR Dragon/transcription disclaimer: Some of this encounter note is an electronic transcription/translation of spoken language to printed text. The electronic translation of spoken language may permit erroneous, or at times, nonsensical words or phrases to be inadvertently transcribed.  Although I have reviewed the note for such errors, some may still exist.    Electronically signed by JERILYN Cheney on 1/15/2020 at 2:33 PM

## 2020-01-22 ENCOUNTER — TELEPHONE (OUTPATIENT)
Dept: PRIMARY CARE CLINIC | Age: 55
End: 2020-01-22

## 2020-01-28 ENCOUNTER — OFFICE VISIT (OUTPATIENT)
Dept: PRIMARY CARE CLINIC | Age: 55
End: 2020-01-28
Payer: COMMERCIAL

## 2020-01-28 VITALS
HEART RATE: 88 BPM | OXYGEN SATURATION: 98 % | HEIGHT: 63 IN | SYSTOLIC BLOOD PRESSURE: 128 MMHG | RESPIRATION RATE: 17 BRPM | TEMPERATURE: 98.4 F | WEIGHT: 162 LBS | DIASTOLIC BLOOD PRESSURE: 84 MMHG | BODY MASS INDEX: 28.7 KG/M2

## 2020-01-28 PROCEDURE — 99214 OFFICE O/P EST MOD 30 MIN: CPT | Performed by: NURSE PRACTITIONER

## 2020-01-28 ASSESSMENT — ENCOUNTER SYMPTOMS
RESPIRATORY NEGATIVE: 1
GASTROINTESTINAL NEGATIVE: 1
EYES NEGATIVE: 1

## 2020-01-28 NOTE — PROGRESS NOTES
SCREENING performed by Daniel Guzman DO at [facility] [place] [facility] OR    [place] EGD TRANSORAL BIOPSY SINGLE/MULTIPLE N/A 7/26/2018    Dr Mandel-w/dilation over wire-51 French-mild esophageal narrowing-Yas (-)    TUBAL LIGATION      TUMOR REMOVAL      right deltoid area    UPPER GASTROINTESTINAL ENDOSCOPY  11/06/2013    Dr Sae Daugherty: normal    UPPER GASTROINTESTINAL ENDOSCOPY  7/26/2018    Dr Mandel-w/dilation over wire-51 French-mild esophageal narrowing-Yas (-)       Social History     Tobacco Use    Smoking status: Current Every Day Smoker     Packs/day: 0.50     Years: 32.00     Pack years: 16.00     Types: Cigarettes, E-Cigarettes    Smokeless tobacco: Never Used   Substance Use Topics    Alcohol use: No     Alcohol/week: 0.0 standard drinks        Current Outpatient Medications   Medication Sig Dispense Refill    busPIRone (BUSPAR) 10 MG tablet TAKE 1 TABLET BY MOUTH TWICE DAILY 60 tablet 0    SUMAtriptan (IMITREX) 25 MG tablet TAKE 1 TABLET BY MOUTH ONCE AS NEEDED FOR MIGRAINE HEADACHE 30 tablet 0    omeprazole (PRILOSEC) 40 MG delayed release capsule TAKE ONE CAPSULE BY MOUTH ONCE DAILY 30 capsule 5    amitriptyline (ELAVIL) 100 MG tablet TAKE ONE TABLET BY MOUTH NIGHTLY. (Patient taking differently: 50 mg TAKE ONE TABLET BY MOUTH NIGHTLY. ) 90 tablet 1    propranolol (INDERAL) 10 MG tablet TAKE ONE TABLET BY MOUTH THREE TIMES DAILY 270 tablet 3    fluticasone (FLONASE) 50 MCG/ACT nasal spray USE 1 SPRAY(S) IN EACH NOSTRIL ONCE DAILY 1 Bottle 5    topiramate (TOPAMAX) 50 MG tablet TAKE 1 TABLET BY MOUTH TWICE DAILY 60 tablet 5    zoster recombinant adjuvanted vaccine (SHINGRIX) 50 MCG/0.5ML SUSR injection Inject 0.5 mLs into the muscle See Admin Instructions 1 dose now and repeat in 2-6 months 0.5 mL 0    rOPINIRole (REQUIP) 0.25 MG tablet Take 1 tablet by mouth nightly 30 tablet 3    gabapentin (NEURONTIN) 300 MG capsule TAKE 2 CAPSULES BY MOUTH EVERY 12 HOURS  5    tiZANidine (ZANAFLEX) 4 MG tablet TAKE 1 TABLET BY MOUTH THREE TIMES DAILY 90 tablet 2    diclofenac sodium 1 % GEL Apply 2 g topically 4 times daily 2 Tube 1    citalopram (CELEXA) 40 MG tablet TAKE ONE TABLET BY MOUTH ONCE DAILY 90 tablet 1    simvastatin (ZOCOR) 10 MG tablet Take 1 tablet by mouth nightly 90 tablet 1    methocarbamol (ROBAXIN) 750 MG tablet       HYDROcodone-acetaminophen (NORCO)  MG per tablet       SUMAtriptan (IMITREX) 25 MG tablet Take 1 tablet by mouth once as needed for Migraine 15 tablet 0    gabapentin (NEURONTIN) 300 MG capsule Take 2 capsules by mouth 3 times daily for 30 days. Intended supply: 30 days 180 capsule 1     No current facility-administered medications for this visit. Allergies   Allergen Reactions    Ventolin [Albuterol] Swelling       Family History   Problem Relation Age of Onset    Colon Cancer Maternal Aunt     Colon Polyps Maternal Aunt     Liver Cancer Neg Hx     Liver Disease Neg Hx     Rectal Cancer Neg Hx     Stomach Cancer Neg Hx                Subjective:      Review of Systems   Constitutional: Negative. HENT: Negative. Eyes: Negative. Respiratory: Negative. Cardiovascular: Negative. Gastrointestinal: Negative. Endocrine: Negative. Genitourinary: Negative. Musculoskeletal: Negative. Skin: Negative. Neurological: Positive for headaches. Hematological: Negative. Psychiatric/Behavioral: Positive for sleep disturbance. The patient is nervous/anxious. Objective:     Physical Exam  Vitals signs and nursing note reviewed. Constitutional:       Appearance: Normal appearance. She is normal weight. HENT:      Head: Normocephalic and atraumatic. Jaw: There is normal jaw occlusion. Right Ear: Hearing, tympanic membrane, ear canal and external ear normal.      Left Ear: Hearing, tympanic membrane, ear canal and external ear normal.      Nose: Nose normal.      Mouth/Throat:      Lips: Pink.       Mouth: Mucous membranes are moist. 98%   BMI 28.70 kg/m²     Assessment:      Diagnosis Orders   1. Anxiety     2. Primary insomnia     3. Nonintractable headache, unspecified chronicity pattern, unspecified headache type         No results found for this visit on 01/28/20. Plan:     Having patient increase Elavil back up to 100 mg nightly. Explained to patient this medication is helping with anxiety, headaches,  and insomnia. RTC in 1 month to monitor increase of this medication. Return for Keep follow up as scheduled. No orders of the defined types were placed in this encounter. No orders of the defined types were placed in this encounter. Patient offered educational handouts and has had all questions answered. Patient voices understanding and agrees to plans along with risks and benefits of plan. Patient is instructed to continue prior meds, diet, and exercise plans as instructed. Patient agrees to follow up as instructed and sooner if needed. Patient agrees to go to ER if condition becomes emergent. EMR Dragon/transcription disclaimer: Some of this encounter note is an electronic transcription/translation of spoken language to printed text. The electronic translation of spoken language may permit erroneous, or at times, nonsensical words or phrases to be inadvertently transcribed.  Although I have reviewed the note for such errors, some may still exist.    Electronically signed by JERILYN Wynn on 1/29/2020 at 9:28 PM

## 2020-02-04 ENCOUNTER — TELEPHONE (OUTPATIENT)
Dept: PRIMARY CARE CLINIC | Age: 55
End: 2020-02-04

## 2020-02-28 RX ORDER — CITALOPRAM 40 MG/1
TABLET ORAL
Qty: 90 TABLET | Refills: 0 | Status: SHIPPED | OUTPATIENT
Start: 2020-02-28 | End: 2021-02-02 | Stop reason: SDUPTHER

## 2020-02-28 RX ORDER — SIMVASTATIN 10 MG
10 TABLET ORAL NIGHTLY
Qty: 90 TABLET | Refills: 0 | Status: SHIPPED | OUTPATIENT
Start: 2020-02-28 | End: 2020-03-30 | Stop reason: SDUPTHER

## 2020-02-28 RX ORDER — SUMATRIPTAN 25 MG/1
TABLET, FILM COATED ORAL
Qty: 30 TABLET | Refills: 0 | Status: SHIPPED | OUTPATIENT
Start: 2020-02-28 | End: 2020-03-31

## 2020-02-28 RX ORDER — BUSPIRONE HYDROCHLORIDE 10 MG/1
TABLET ORAL
Qty: 60 TABLET | Refills: 0 | Status: SHIPPED | OUTPATIENT
Start: 2020-02-28 | End: 2020-03-10 | Stop reason: SDUPTHER

## 2020-03-10 ENCOUNTER — OFFICE VISIT (OUTPATIENT)
Dept: PRIMARY CARE CLINIC | Age: 55
End: 2020-03-10
Payer: COMMERCIAL

## 2020-03-10 VITALS
BODY MASS INDEX: 30.3 KG/M2 | TEMPERATURE: 97.8 F | RESPIRATION RATE: 18 BRPM | WEIGHT: 171 LBS | HEART RATE: 75 BPM | OXYGEN SATURATION: 96 % | SYSTOLIC BLOOD PRESSURE: 130 MMHG | DIASTOLIC BLOOD PRESSURE: 82 MMHG | HEIGHT: 63 IN

## 2020-03-10 PROCEDURE — 99396 PREV VISIT EST AGE 40-64: CPT | Performed by: NURSE PRACTITIONER

## 2020-03-10 RX ORDER — BUSPIRONE HYDROCHLORIDE 10 MG/1
TABLET ORAL
Qty: 60 TABLET | Refills: 2 | Status: SHIPPED | OUTPATIENT
Start: 2020-03-10 | End: 2020-03-30 | Stop reason: SDUPTHER

## 2020-03-10 RX ORDER — TIZANIDINE 4 MG/1
4 TABLET ORAL 3 TIMES DAILY
Qty: 90 TABLET | Refills: 2 | Status: SHIPPED | OUTPATIENT
Start: 2020-03-10 | End: 2020-03-30 | Stop reason: SDUPTHER

## 2020-03-10 ASSESSMENT — ENCOUNTER SYMPTOMS
EYES NEGATIVE: 1
RESPIRATORY NEGATIVE: 1
GASTROINTESTINAL NEGATIVE: 1

## 2020-03-10 NOTE — PROGRESS NOTES
[facility]  [address] [address]  [address] [address]  Dept: [phone]  Dept Fax: [phone]  Loc: [phone]    Laura Ace is a 49 y.o. female who presents today for her medical conditions/complaints as noted below. Laura Ace is c/o of Annual Exam (no concerns ) and Gynecologic Exam (Pt is requesting PAP today )      Chief Complaint   Patient presents with    Annual Exam     no concerns     Gynecologic Exam     Pt is requesting PAP today        HPI:     HPI   Patient here for annual physical exam with PAP. Patient denies any issues.   She has had hysterectomy in the past.      Past Medical History:   Diagnosis Date    Anxiety     Arthritis     Chronic tension-type headache, intractable 1/8/2016    Depression     Headache(784.0)     migraine    Lupus ([address]     Neck pain 1/8/2016        Past Surgical History:   Procedure Laterality Date    COLONOSCOPY  10/23/2013    Dr Jorge Labrum: internal hemorrhoids; hyperplastic polyps (5 yr recal for screening)    HYSTERECTOMY      NECK SURGERY  07/26/2016    Dr Arnel Walters FLX DX W/COLLJ SPEC WHEN PFRMD N/A 2/6/2017    COLONOSCOPY DIAGNOSTIC OR SCREENING performed by Beverley Watts DO at [address] [facility] OR    [place] EGD TRANSORAL BIOPSY SINGLE/MULTIPLE N/A 7/26/2018    Dr Mandel-gal/dilation over wire-51 Burmese-mild esophageal narrowing-Yas (-)    TUBAL LIGATION      TUMOR REMOVAL      right deltoid area    UPPER GASTROINTESTINAL ENDOSCOPY  11/06/2013    Dr Jorge Labrum: normal    UPPER GASTROINTESTINAL ENDOSCOPY  7/26/2018    Dr Roland/dilation over wire-51 Burmese-mild esophageal narrowing-Yas (-)       Social History     Tobacco Use    Smoking status: Current Every Day Smoker     Packs/day: 0.50     Years: 32.00     Pack years: 16.00     Types: Cigarettes, E-Cigarettes    Smokeless tobacco: Never Used   Substance Use Topics    Alcohol use: No     Alcohol/week: 0.0 standard drinks        Current Outpatient Medications   Medication Sig Dispense Refill    busPIRone (BUSPAR) 10 MG tablet Take 1 tablet by mouth twice daily 60 tablet 2    tiZANidine (ZANAFLEX) 4 MG tablet Take 1 tablet by mouth three times daily 90 tablet 2    citalopram (CELEXA) 40 MG tablet Take 1 tablet by mouth once daily 90 tablet 0    simvastatin (ZOCOR) 10 MG tablet Take 1 tablet by mouth nightly 90 tablet 0    SUMAtriptan (IMITREX) 25 MG tablet TAKE 1 TABLET BY MOUTH AS NEEDED FOR MIGRAINE HEADACHE 30 tablet 0    omeprazole (PRILOSEC) 40 MG delayed release capsule TAKE ONE CAPSULE BY MOUTH ONCE DAILY 30 capsule 5    amitriptyline (ELAVIL) 100 MG tablet TAKE ONE TABLET BY MOUTH NIGHTLY. (Patient taking differently: 50 mg TAKE ONE TABLET BY MOUTH NIGHTLY. ) 90 tablet 1    propranolol (INDERAL) 10 MG tablet TAKE ONE TABLET BY MOUTH THREE TIMES DAILY 270 tablet 3    fluticasone (FLONASE) 50 MCG/ACT nasal spray USE 1 SPRAY(S) IN EACH NOSTRIL ONCE DAILY 1 Bottle 5    topiramate (TOPAMAX) 50 MG tablet TAKE 1 TABLET BY MOUTH TWICE DAILY 60 tablet 5    zoster recombinant adjuvanted vaccine (SHINGRIX) 50 MCG/0.5ML SUSR injection Inject 0.5 mLs into the muscle See Admin Instructions 1 dose now and repeat in 2-6 months 0.5 mL 0    rOPINIRole (REQUIP) 0.25 MG tablet Take 1 tablet by mouth nightly 30 tablet 3    gabapentin (NEURONTIN) 300 MG capsule TAKE 2 CAPSULES BY MOUTH EVERY 12 HOURS  5    diclofenac sodium 1 % GEL Apply 2 g topically 4 times daily 2 Tube 1    methocarbamol (ROBAXIN) 750 MG tablet       HYDROcodone-acetaminophen (NORCO)  MG per tablet       SUMAtriptan (IMITREX) 25 MG tablet Take 1 tablet by mouth once as needed for Migraine 15 tablet 0    gabapentin (NEURONTIN) 300 MG capsule Take 2 capsules by mouth 3 times daily for 30 days. Intended supply: 30 days 180 capsule 1     No current facility-administered medications for this visit.         Allergies   Allergen Reactions    Ventolin [Albuterol] Swelling Packs/day: 0.50        Years: 32.00        Pack years: 16        Types: Cigarettes, E-Cigarettes      Smokeless tobacco: Never Used       Standing Status:   Future     Standing Expiration Date:   3/10/2021     Order Specific Question:   Collection Type     Answer: Thin Prep     Order Specific Question:   Prior Abnormal Pap Test     Answer:   No     Order Specific Question:   Screening or Diagnostic     Answer:   Screening     Order Specific Question:   HPV Requested? Answer:   Yes     Order Specific Question:   High Risk Patient     Answer:   N/A    Human papillomavirus (HPV) DNA Probe Thin Prep High Risk     Standing Status:   Future     Standing Expiration Date:   3/10/2021     Order Specific Question:   Specify Date & Time of Incident     Answer:   none       Orders Placed This Encounter   Medications    busPIRone (BUSPAR) 10 MG tablet     Sig: Take 1 tablet by mouth twice daily     Dispense:  60 tablet     Refill:  2    tiZANidine (ZANAFLEX) 4 MG tablet     Sig: Take 1 tablet by mouth three times daily     Dispense:  90 tablet     Refill:  2     Please consider 90 day supplies to promote better adherence        Patient offered educational handouts and has had all questions answered. Patient voices understanding and agrees to plans along with risks and benefits of plan. Patient is instructed to continue prior meds, diet, and exercise plans as instructed. Patient agrees to follow up as instructed and sooner if needed. Patient agrees to go to ER if condition becomes emergent. EMR Dragon/transcription disclaimer: Some of this encounter note is an electronic transcription/translation of spoken language to printed text. The electronic translation of spoken language may permit erroneous, or at times, nonsensical words or phrases to be inadvertently transcribed.  Although I have reviewed the note for such errors, some may still exist.    Electronically signed by JERILYN Gamboa on 3/10/2020 at 2:10 PM

## 2020-03-11 DIAGNOSIS — Z01.419 WELL WOMAN EXAM WITH ROUTINE GYNECOLOGICAL EXAM: ICD-10-CM

## 2020-03-13 ENCOUNTER — TELEPHONE (OUTPATIENT)
Dept: NEUROSURGERY | Facility: CLINIC | Age: 55
End: 2020-03-13

## 2020-03-13 LAB
HPV COMMENT: NORMAL
HPV TYPE 16: NOT DETECTED
HPV TYPE 18: NOT DETECTED
HPVOH (OTHER TYPES): NOT DETECTED

## 2020-03-18 ENCOUNTER — TELEPHONE (OUTPATIENT)
Dept: NEUROSURGERY | Facility: CLINIC | Age: 55
End: 2020-03-18

## 2020-03-18 NOTE — TELEPHONE ENCOUNTER
CALLED AND LEFT A VM FOR PATIENT TO CALL AND  RESCHEDULE NO SHOW APPT WITH JIA SPRINGER.    HUB CAN SCHEDULE FIRST AVAILABLE.

## 2020-03-30 ENCOUNTER — TELEPHONE (OUTPATIENT)
Dept: FAMILY MEDICINE CLINIC | Age: 55
End: 2020-03-30

## 2020-03-30 RX ORDER — SIMVASTATIN 10 MG
10 TABLET ORAL NIGHTLY
Qty: 90 TABLET | Refills: 0 | Status: SHIPPED | OUTPATIENT
Start: 2020-03-30 | End: 2021-02-02 | Stop reason: SDUPTHER

## 2020-03-30 RX ORDER — BUSPIRONE HYDROCHLORIDE 10 MG/1
TABLET ORAL
Qty: 60 TABLET | Refills: 2 | Status: SHIPPED | OUTPATIENT
Start: 2020-03-30 | End: 2021-02-02 | Stop reason: SDUPTHER

## 2020-03-30 RX ORDER — TIZANIDINE 4 MG/1
4 TABLET ORAL 3 TIMES DAILY
Qty: 90 TABLET | Refills: 2 | Status: SHIPPED | OUTPATIENT
Start: 2020-03-30 | End: 2021-02-02

## 2020-03-30 RX ORDER — ROPINIROLE 0.25 MG/1
0.25 TABLET, FILM COATED ORAL NIGHTLY
Qty: 30 TABLET | Refills: 3 | Status: SHIPPED | OUTPATIENT
Start: 2020-03-30 | End: 2021-02-02 | Stop reason: SDUPTHER

## 2020-03-31 RX ORDER — SUMATRIPTAN 25 MG/1
TABLET, FILM COATED ORAL
Qty: 30 TABLET | Refills: 0 | Status: SHIPPED | OUTPATIENT
Start: 2020-03-31 | End: 2021-02-02 | Stop reason: SDUPTHER

## 2020-04-03 ENCOUNTER — VIRTUAL VISIT (OUTPATIENT)
Dept: PRIMARY CARE CLINIC | Age: 55
End: 2020-04-03
Payer: COMMERCIAL

## 2020-04-03 PROCEDURE — 99423 OL DIG E/M SVC 21+ MIN: CPT | Performed by: NURSE PRACTITIONER

## 2020-04-03 PROCEDURE — 99406 BEHAV CHNG SMOKING 3-10 MIN: CPT | Performed by: NURSE PRACTITIONER

## 2020-04-03 ASSESSMENT — ENCOUNTER SYMPTOMS
BACK PAIN: 1
RESPIRATORY NEGATIVE: 1
RECTAL PAIN: 1
EYES NEGATIVE: 1

## 2020-04-03 NOTE — PROGRESS NOTES
capsule TAKE ONE CAPSULE BY MOUTH ONCE DAILY Yes Renny Like, APRN   amitriptyline (ELAVIL) 100 MG tablet TAKE ONE TABLET BY MOUTH NIGHTLY. Patient taking differently: 50 mg TAKE ONE TABLET BY MOUTH NIGHTLY. Yes Renny Like, APRN   propranolol (INDERAL) 10 MG tablet TAKE ONE TABLET BY MOUTH THREE TIMES DAILY Yes Renny Like, APRN   fluticasone (FLONASE) 50 MCG/ACT nasal spray USE 1 SPRAY(S) IN EACH NOSTRIL ONCE DAILY Yes Ernny Like, APRN   topiramate (TOPAMAX) 50 MG tablet TAKE 1 TABLET BY MOUTH TWICE DAILY Yes Renny Like, APRN   diclofenac sodium 1 % GEL Apply 2 g topically 4 times daily Yes Renny Like, APRN   SUMAtriptan (IMITREX) 25 MG tablet Take 1 tablet by mouth once as needed for Migraine Yes Renny Like, APRN   gabapentin (NEURONTIN) 300 MG capsule Take 2 capsules by mouth 3 times daily for 30 days.  Intended supply: 30 days Yes Renny Like, APRN   methocarbamol (ROBAXIN) 750 MG tablet  Yes Historical Provider, MD   HYDROcodone-acetaminophen (Fredick Sly)  MG per tablet  Yes Historical Provider, MD   zoster recombinant adjuvanted vaccine (SHINGRIX) 50 MCG/0.5ML SUSR injection Inject 0.5 mLs into the muscle See Admin Instructions 1 dose now and repeat in 2-6 months  Renny Like, APRN   gabapentin (NEURONTIN) 300 MG capsule TAKE 2 CAPSULES BY MOUTH EVERY 12 HOURS  Historical Provider, MD       Social History     Tobacco Use    Smoking status: Current Every Day Smoker     Packs/day: 0.50     Years: 32.00     Pack years: 16.00     Types: Cigarettes, E-Cigarettes    Smokeless tobacco: Never Used   Substance Use Topics    Alcohol use: No     Alcohol/week: 0.0 standard drinks    Drug use: No        Allergies   Allergen Reactions    Ventolin [Albuterol] Swelling   ,   Past Medical History:   Diagnosis Date    Anxiety     Arthritis     Chronic tension-type headache, intractable 1/8/2016    Depression     Headache(784.0)     migraine    Lupus ([address]     Neck pain 1/8/2016   ,   Past Surgical [] Abnormal       Neurological:        [x] No Facial Asymmetry (Cranial nerve 7 motor function) (limited exam to video visit)          [] No gaze palsy        [] Abnormal         Skin:        [x] No significant exanthematous lesions or discoloration noted on facial skin         [] Abnormal            Psychiatric:       [x] Normal Affect [] Abnormal        [] No Hallucinations    Other pertinent observable physical exam findings:-    Due to this being a TeleHealth encounter, evaluation of the following organ systems is limited: Vitals/Constitutional/EENT/Resp/CV/GI//MS/Neuro/Skin/Heme-Lymph-Imm. ASSESSMENT/PLAN:  1. Cervical radiculopathy      2. Lumbar back pain    - MRI LUMBAR SPINE WO CONTRAST; Future    3. Tobacco abuse      4. Tobacco abuse counseling      5. Hemorrhoids, unspecified hemorrhoid type    - hydrocortisone (ANUSOL-HC) 2.5 % rectal cream; Place rectally 2 times daily. Dispense: 28 g; Refill: 1        I am checking MRI of lumbar due to worsening pain. We will try to order this and schedule MRI after coronavirus is over. Anusol sent to pharmacy for hemorrhoids. Approximately 5 minutes of education was provided about quit smoking and the harms of tobacco.  Patient does show understanding. Patient does not have  the desire to quit smoking in the future. Return in about 3 months (around 7/3/2020) for Follow up chronic conditions. An  electronic signature was used to authenticate this note. --Lazarus Hams, APRN on 4/6/2020 at 9:44 AM        Pursuant to the emergency declaration under the [facility], [facility] waiver authority and the [place] and [employer] Act, this Virtual  Visit was conducted, with patient's consent, to reduce the patient's risk of exposure to COVID-19 and provide continuity of care for an established patient.     Services were provided through a video synchronous discussion virtually to

## 2021-02-02 ENCOUNTER — OFFICE VISIT (OUTPATIENT)
Dept: PRIMARY CARE CLINIC | Age: 56
End: 2021-02-02
Payer: COMMERCIAL

## 2021-02-02 VITALS
OXYGEN SATURATION: 99 % | RESPIRATION RATE: 18 BRPM | TEMPERATURE: 98 F | DIASTOLIC BLOOD PRESSURE: 84 MMHG | WEIGHT: 162 LBS | HEIGHT: 63 IN | SYSTOLIC BLOOD PRESSURE: 126 MMHG | BODY MASS INDEX: 28.7 KG/M2 | HEART RATE: 93 BPM

## 2021-02-02 DIAGNOSIS — S09.90XA INJURY OF HEAD, INITIAL ENCOUNTER: ICD-10-CM

## 2021-02-02 DIAGNOSIS — K21.9 GASTROESOPHAGEAL REFLUX DISEASE WITHOUT ESOPHAGITIS: ICD-10-CM

## 2021-02-02 DIAGNOSIS — Z13.220 LIPID SCREENING: ICD-10-CM

## 2021-02-02 DIAGNOSIS — R73.09 ELEVATED GLUCOSE: ICD-10-CM

## 2021-02-02 DIAGNOSIS — R51.9 ACHING HEADACHE: ICD-10-CM

## 2021-02-02 DIAGNOSIS — Z12.31 ENCOUNTER FOR SCREENING MAMMOGRAM FOR MALIGNANT NEOPLASM OF BREAST: ICD-10-CM

## 2021-02-02 DIAGNOSIS — F51.01 PRIMARY INSOMNIA: ICD-10-CM

## 2021-02-02 DIAGNOSIS — F41.9 ANXIETY: ICD-10-CM

## 2021-02-02 DIAGNOSIS — F41.9 ANXIETY: Primary | ICD-10-CM

## 2021-02-02 DIAGNOSIS — R51.9 NONINTRACTABLE HEADACHE, UNSPECIFIED CHRONICITY PATTERN, UNSPECIFIED HEADACHE TYPE: ICD-10-CM

## 2021-02-02 DIAGNOSIS — G89.4 CHRONIC PAIN SYNDROME: ICD-10-CM

## 2021-02-02 LAB
ALBUMIN SERPL-MCNC: 4.3 G/DL (ref 3.5–5.2)
ALP BLD-CCNC: 84 U/L (ref 35–104)
ALT SERPL-CCNC: 8 U/L (ref 5–33)
ANION GAP SERPL CALCULATED.3IONS-SCNC: 19 MMOL/L (ref 7–19)
AST SERPL-CCNC: 13 U/L (ref 5–32)
BASOPHILS ABSOLUTE: 0.1 K/UL (ref 0–0.2)
BASOPHILS RELATIVE PERCENT: 0.8 % (ref 0–1)
BILIRUB SERPL-MCNC: <0.2 MG/DL (ref 0.2–1.2)
BUN BLDV-MCNC: 20 MG/DL (ref 6–20)
CALCIUM SERPL-MCNC: 9.4 MG/DL (ref 8.6–10)
CHLORIDE BLD-SCNC: 110 MMOL/L (ref 98–111)
CHOLESTEROL, FASTING: 183 MG/DL (ref 160–199)
CO2: 16 MMOL/L (ref 22–29)
CREAT SERPL-MCNC: 0.8 MG/DL (ref 0.5–0.9)
EOSINOPHILS ABSOLUTE: 0.3 K/UL (ref 0–0.6)
EOSINOPHILS RELATIVE PERCENT: 4.9 % (ref 0–5)
GFR AFRICAN AMERICAN: >59
GFR NON-AFRICAN AMERICAN: >60
GLUCOSE BLD-MCNC: 98 MG/DL (ref 74–109)
HBA1C MFR BLD: 5.4 % (ref 4–6)
HCT VFR BLD CALC: 40.9 % (ref 37–47)
HDLC SERPL-MCNC: 65 MG/DL (ref 65–121)
HEMOGLOBIN: 12.9 G/DL (ref 12–16)
IMMATURE GRANULOCYTES #: 0 K/UL
LDL CHOLESTEROL CALCULATED: 84 MG/DL
LYMPHOCYTES ABSOLUTE: 2.5 K/UL (ref 1.1–4.5)
LYMPHOCYTES RELATIVE PERCENT: 40.2 % (ref 20–40)
MCH RBC QN AUTO: 32 PG (ref 27–31)
MCHC RBC AUTO-ENTMCNC: 31.5 G/DL (ref 33–37)
MCV RBC AUTO: 101.5 FL (ref 81–99)
MONOCYTES ABSOLUTE: 0.6 K/UL (ref 0–0.9)
MONOCYTES RELATIVE PERCENT: 9.1 % (ref 0–10)
NEUTROPHILS ABSOLUTE: 2.7 K/UL (ref 1.5–7.5)
NEUTROPHILS RELATIVE PERCENT: 44.7 % (ref 50–65)
PDW BLD-RTO: 13.2 % (ref 11.5–14.5)
PLATELET # BLD: 293 K/UL (ref 130–400)
PMV BLD AUTO: 9.3 FL (ref 9.4–12.3)
POTASSIUM SERPL-SCNC: 4.3 MMOL/L (ref 3.5–5)
RBC # BLD: 4.03 M/UL (ref 4.2–5.4)
SODIUM BLD-SCNC: 145 MMOL/L (ref 136–145)
TOTAL PROTEIN: 7.6 G/DL (ref 6.6–8.7)
TRIGLYCERIDE, FASTING: 172 MG/DL (ref 0–149)
TSH REFLEX FT4: 4.07 UIU/ML (ref 0.35–5.5)
WBC # BLD: 6.1 K/UL (ref 4.8–10.8)

## 2021-02-02 PROCEDURE — 99214 OFFICE O/P EST MOD 30 MIN: CPT | Performed by: NURSE PRACTITIONER

## 2021-02-02 RX ORDER — SUCRALFATE 1 G/1
1 TABLET ORAL 2 TIMES DAILY
COMMUNITY
End: 2021-02-02 | Stop reason: SDUPTHER

## 2021-02-02 RX ORDER — CITALOPRAM 40 MG/1
TABLET ORAL
Qty: 90 TABLET | Refills: 0 | Status: SHIPPED | OUTPATIENT
Start: 2021-02-02 | End: 2021-03-21

## 2021-02-02 RX ORDER — TOPIRAMATE 50 MG/1
TABLET, FILM COATED ORAL
Qty: 60 TABLET | Refills: 5 | Status: SHIPPED | OUTPATIENT
Start: 2021-02-02

## 2021-02-02 RX ORDER — SUCRALFATE 1 G/1
1 TABLET ORAL 2 TIMES DAILY
Qty: 60 TABLET | Refills: 2 | Status: SHIPPED | OUTPATIENT
Start: 2021-02-02 | End: 2021-03-21

## 2021-02-02 RX ORDER — OMEPRAZOLE 40 MG/1
CAPSULE, DELAYED RELEASE ORAL
Qty: 30 CAPSULE | Refills: 5 | Status: SHIPPED | OUTPATIENT
Start: 2021-02-02

## 2021-02-02 RX ORDER — AMITRIPTYLINE HYDROCHLORIDE 100 MG/1
TABLET, FILM COATED ORAL
Qty: 90 TABLET | Refills: 1 | Status: ON HOLD | OUTPATIENT
Start: 2021-02-02 | End: 2021-05-28 | Stop reason: HOSPADM

## 2021-02-02 RX ORDER — SUMATRIPTAN 25 MG/1
TABLET, FILM COATED ORAL
Qty: 30 TABLET | Refills: 2 | Status: SHIPPED | OUTPATIENT
Start: 2021-02-02

## 2021-02-02 RX ORDER — SIMVASTATIN 10 MG
10 TABLET ORAL NIGHTLY
Qty: 90 TABLET | Refills: 0 | Status: SHIPPED | OUTPATIENT
Start: 2021-02-02 | End: 2021-03-21

## 2021-02-02 RX ORDER — PROPRANOLOL HYDROCHLORIDE 10 MG/1
TABLET ORAL
Qty: 270 TABLET | Refills: 3 | Status: ON HOLD | OUTPATIENT
Start: 2021-02-02 | End: 2021-05-28 | Stop reason: HOSPADM

## 2021-02-02 RX ORDER — ROPINIROLE 0.25 MG/1
0.25 TABLET, FILM COATED ORAL NIGHTLY
Qty: 30 TABLET | Refills: 3 | Status: SHIPPED | OUTPATIENT
Start: 2021-02-02

## 2021-02-02 RX ORDER — BUSPIRONE HYDROCHLORIDE 10 MG/1
TABLET ORAL
Qty: 60 TABLET | Refills: 2 | Status: SHIPPED | OUTPATIENT
Start: 2021-02-02 | End: 2021-03-21

## 2021-02-02 RX ORDER — FLUTICASONE PROPIONATE 50 MCG
SPRAY, SUSPENSION (ML) NASAL
Qty: 1 BOTTLE | Refills: 5 | Status: SHIPPED | OUTPATIENT
Start: 2021-02-02

## 2021-02-02 RX ORDER — CLONAZEPAM 0.5 MG/1
0.5 TABLET ORAL 2 TIMES DAILY PRN
COMMUNITY

## 2021-02-02 SDOH — ECONOMIC STABILITY: FOOD INSECURITY: WITHIN THE PAST 12 MONTHS, THE FOOD YOU BOUGHT JUST DIDN'T LAST AND YOU DIDN'T HAVE MONEY TO GET MORE.: NEVER TRUE

## 2021-02-02 ASSESSMENT — ENCOUNTER SYMPTOMS
EYES NEGATIVE: 1
BACK PAIN: 1
GASTROINTESTINAL NEGATIVE: 1
RESPIRATORY NEGATIVE: 1

## 2021-02-02 NOTE — PROGRESS NOTES
[address] [facility]  [address] [address] [address]  [address] [address]  Dept: [phone]  Dept Fax: [phone]  Loc: [phone]    Funmilayo Hamilton is a 48 y.o. female who presents today for her medical conditions/complaints as noted below. Funmilayo Hamilton is c/o of Referral - General (Pain Management would not explain any thing else said she would talk to provider about it ), Insomnia (Pt states she was on Elavil and that she fell asleep on the toliet and hurt her face she states she is now scared to fall asleep and is not sleeping at all. ), and Headache (Pt states she has knot on her head happened 1 month ago and she has not had it checked out it happened due to a fall off the toliet while in [facility]. )      Chief Complaint   Patient presents with    Referral - General     Pain Management would not explain any thing else said she would talk to provider about it     Insomnia     Pt states she was on Elavil and that she fell asleep on the toliet and hurt her face she states she is now scared to fall asleep and is not sleeping at all.  Headache     Pt states she has knot on her head happened 1 month ago and she has not had it checked out it happened due to a fall off the toliet while in [place]. HPI:     HPI   Patient is here for follow up on chronic conditions including insomnia, anxiety, pain. Patient reports having to go to [place] and while she was there she ended up falling asleep on the toilet and hit her head. She reports that this did occur 1 month ago. She reports not going to the hospital for this episode. She reports that headaches have worsened since hitting her head 1 month ago. She reports seeing pain management, but ended up getting thrown out due to abnormal UDS. She was positive for Fentanyl and reports that it was her mothers meds.      Past Medical History:   Diagnosis Date    Anxiety     Arthritis  Chronic tension-type headache, intractable 1/8/2016    Depression     Headache(784.0)     migraine    Lupus ([address]     Neck pain 1/8/2016        Past Surgical History:   Procedure Laterality Date    COLONOSCOPY  10/23/2013    Dr Boles Later: internal hemorrhoids; hyperplastic polyps (5 yr recal for screening)    HYSTERECTOMY      NECK SURGERY  07/26/2016    Dr Jerry Serra FLX DX W/COLLJ SPEC WHEN PFRMD N/A 2/6/2017    COLONOSCOPY DIAGNOSTIC OR SCREENING performed by Skylar Barrera DO at [address] [facility] [facility] OR    MI EGD TRANSORAL BIOPSY SINGLE/MULTIPLE N/A 7/26/2018    Dr Watkinsw/dilation over wire-51 French-mild esophageal narrowing-Yas (-)    TUBAL LIGATION      TUMOR REMOVAL      right deltoid area    UPPER GASTROINTESTINAL ENDOSCOPY  11/06/2013    Dr Boles Later: normal    UPPER GASTROINTESTINAL ENDOSCOPY  7/26/2018    Dr Roland/dilation over wire-51 French-mild esophageal narrowing-Yas (-)       Social History     Tobacco Use    Smoking status: Current Every Day Smoker     Packs/day: 0.50     Years: 32.00     Pack years: 16.00     Types: Cigarettes, E-Cigarettes    Smokeless tobacco: Never Used   Substance Use Topics    Alcohol use: No     Alcohol/week: 0.0 standard drinks        Current Outpatient Medications   Medication Sig Dispense Refill    clonazePAM (KLONOPIN) 0.5 MG tablet Take 0.5 mg by mouth 2 times daily as needed.       sucralfate (CARAFATE) 1 GM tablet Take 1 tablet by mouth 2 times daily 60 tablet 2    SUMAtriptan (IMITREX) 25 MG tablet Take 1 tablet prn migraine 30 tablet 2    busPIRone (BUSPAR) 10 MG tablet Take 1 tablet by mouth twice daily 60 tablet 2    simvastatin (ZOCOR) 10 MG tablet Take 1 tablet by mouth nightly 90 tablet 0    rOPINIRole (REQUIP) 0.25 MG tablet Take 1 tablet by mouth nightly 30 tablet 3    citalopram (CELEXA) 40 MG tablet Take 1 tablet by mouth once daily 90 tablet 0  omeprazole (PRILOSEC) 40 MG delayed release capsule TAKE ONE CAPSULE BY MOUTH ONCE DAILY 30 capsule 5    amitriptyline (ELAVIL) 100 MG tablet TAKE ONE TABLET BY MOUTH NIGHTLY. 90 tablet 1    propranolol (INDERAL) 10 MG tablet TAKE ONE TABLET BY MOUTH THREE TIMES DAILY 270 tablet 3    fluticasone (FLONASE) 50 MCG/ACT nasal spray USE 1 SPRAY(S) IN EACH NOSTRIL ONCE DAILY 1 Bottle 5    topiramate (TOPAMAX) 50 MG tablet TAKE 1 TABLET BY MOUTH TWICE DAILY 60 tablet 5    gabapentin (NEURONTIN) 300 MG capsule TAKE 2 CAPSULES BY MOUTH EVERY 12 HOURS  5    diclofenac sodium 1 % GEL Apply 2 g topically 4 times daily 2 Tube 1    gabapentin (NEURONTIN) 300 MG capsule Take 2 capsules by mouth 3 times daily for 30 days. Intended supply: 30 days 180 capsule 1     No current facility-administered medications for this visit. Allergies   Allergen Reactions    Ventolin [Albuterol] Swelling       Family History   Problem Relation Age of Onset    Colon Cancer Maternal Aunt     Colon Polyps Maternal Aunt     Liver Cancer Neg Hx     Liver Disease Neg Hx     Rectal Cancer Neg Hx     Stomach Cancer Neg Hx                Subjective:      Review of Systems   Constitutional: Negative. HENT: Negative. Eyes: Negative. Respiratory: Negative. Cardiovascular: Negative. Gastrointestinal: Negative. Endocrine: Negative. Genitourinary: Negative. Musculoskeletal: Positive for back pain. Skin: Negative. Neurological: Positive for dizziness and headaches. Hematological: Negative. Psychiatric/Behavioral: Positive for sleep disturbance. Negative for self-injury and suicidal ideas. The patient is nervous/anxious. Objective:     Physical Exam  Vitals signs and nursing note reviewed. Constitutional:       Appearance: Normal appearance. HENT:      Head: Normocephalic and atraumatic. Jaw: There is normal jaw occlusion. Thought Content: Thought content normal.         Cognition and Memory: Cognition normal.         Judgment: Judgment normal.         /84   Pulse 93   Temp 98 °F (36.7 °C) (Temporal)   Resp 18   Ht 5' 3\" (1.6 m)   Wt 162 lb (73.5 kg)   SpO2 99%   BMI 28.70 kg/m²     Assessment:      Diagnosis Orders   1. Anxiety  CBC Auto Differential    Comprehensive Metabolic Panel    TSH WITH REFLEX TO FT4    citalopram (CELEXA) 40 MG tablet    amitriptyline (ELAVIL) 100 MG tablet   2. Primary insomnia  TSH WITH REFLEX TO FT4   3. Nonintractable headache, unspecified chronicity pattern, unspecified headache type  topiramate (TOPAMAX) 50 MG tablet   4. Lipid screening  Lipid, Fasting   5. Elevated glucose  Hemoglobin A1C   6. Encounter for screening mammogram for malignant neoplasm of breast  RAFAEL DIGITAL SCREEN W OR WO CAD BILATERAL   7. Aching headache  CT HEAD WO CONTRAST   8. Injury of head, initial encounter  CT HEAD WO CONTRAST   9. Chronic pain syndrome  External Referral To Pain Clinic   10. Gastroesophageal reflux disease without esophagitis  omeprazole (PRILOSEC) 40 MG delayed release capsule       No results found for this visit on 02/02/21. Plan:     I am checking labs - we will call with these results. Mammogram ordered  CT of head ordered. Will call once completed. Refill on all meds  Referral to pain management. Return in about 3 months (around 5/2/2021) for Office Visit, Follow up chronic conditions.     Orders Placed This Encounter   Procedures    RAFAEL DIGITAL SCREEN W OR WO CAD BILATERAL     Standing Status:   Future     Standing Expiration Date:   4/2/2022    CT HEAD WO CONTRAST     Standing Status:   Future     Standing Expiration Date:   2/2/2022    CBC Auto Differential     Standing Status:   Future     Number of Occurrences:   1     Standing Expiration Date:   2/2/2022    Comprehensive Metabolic Panel     Standing Status:   Future     Number of Occurrences:   1 Standing Expiration Date:   2/2/2022    TSH WITH REFLEX TO FT4     Standing Status:   Future     Number of Occurrences:   1     Standing Expiration Date:   2/2/2022    Hemoglobin A1C     Standing Status:   Future     Number of Occurrences:   1     Standing Expiration Date:   2/2/2022    Lipid, Fasting     Standing Status:   Future     Number of Occurrences:   1     Standing Expiration Date:   2/2/2022    External Referral To Pain Clinic     Referral Priority:   Routine     Referral Type:   Eval and Treat     Referral Reason:   Specialty Services Required     Requested Specialty:   Pain Management     Number of Visits Requested:   1       Orders Placed This Encounter   Medications    sucralfate (CARAFATE) 1 GM tablet     Sig: Take 1 tablet by mouth 2 times daily     Dispense:  60 tablet     Refill:  2    SUMAtriptan (IMITREX) 25 MG tablet     Sig: Take 1 tablet prn migraine     Dispense:  30 tablet     Refill:  2    busPIRone (BUSPAR) 10 MG tablet     Sig: Take 1 tablet by mouth twice daily     Dispense:  60 tablet     Refill:  2    simvastatin (ZOCOR) 10 MG tablet     Sig: Take 1 tablet by mouth nightly     Dispense:  90 tablet     Refill:  0    rOPINIRole (REQUIP) 0.25 MG tablet     Sig: Take 1 tablet by mouth nightly     Dispense:  30 tablet     Refill:  3    citalopram (CELEXA) 40 MG tablet     Sig: Take 1 tablet by mouth once daily     Dispense:  90 tablet     Refill:  0    omeprazole (PRILOSEC) 40 MG delayed release capsule     Sig: TAKE ONE CAPSULE BY MOUTH ONCE DAILY     Dispense:  30 capsule     Refill:  5     Please consider 90 day supplies to promote better adherence    amitriptyline (ELAVIL) 100 MG tablet     Sig: TAKE ONE TABLET BY MOUTH NIGHTLY.      Dispense:  90 tablet     Refill:  1     Please consider 90 day supplies to promote better adherence    propranolol (INDERAL) 10 MG tablet     Sig: TAKE ONE TABLET BY MOUTH THREE TIMES DAILY     Dispense:  270 tablet     Refill:  3 Please consider 90 day supplies to promote better adherence    fluticasone (FLONASE) 50 MCG/ACT nasal spray     Sig: USE 1 SPRAY(S) IN EACH NOSTRIL ONCE DAILY     Dispense:  1 Bottle     Refill:  5     Please consider 90 day supplies to promote better adherence    topiramate (TOPAMAX) 50 MG tablet     Sig: TAKE 1 TABLET BY MOUTH TWICE DAILY     Dispense:  60 tablet     Refill:  5     Please consider 90 day supplies to promote better adherence        Patient offered educational handouts and has had all questions answered. Patient voices understanding and agrees to plans along with risks and benefits of plan. Patient is instructed to continue prior meds, diet, and exercise plans as instructed. Patient agrees to follow up as instructed and sooner if needed. Patient agrees to go to ER if condition becomes emergent. EMR Dragon/transcription disclaimer: Some of this encounter note is an electronic transcription/translation of spoken language to printed text. The electronic translation of spoken language may permit erroneous, or at times, nonsensical words or phrases to be inadvertently transcribed.  Although I have reviewed the note for such errors, some may still exist.    Electronically signed by JERILYN Campbell on 2/2/2021 at 2:03 PM

## 2021-02-05 ENCOUNTER — HOSPITAL ENCOUNTER (OUTPATIENT)
Dept: CT IMAGING | Age: 56
Discharge: HOME | End: 2021-02-05
Payer: MEDICAID

## 2021-02-05 ENCOUNTER — HOSPITAL ENCOUNTER (OUTPATIENT)
Dept: WOMENS IMAGING | Age: 56
Discharge: HOME | End: 2021-02-05
Payer: MEDICAID

## 2021-02-05 DIAGNOSIS — Z12.31 ENCOUNTER FOR SCREENING MAMMOGRAM FOR MALIGNANT NEOPLASM OF BREAST: ICD-10-CM

## 2021-02-05 DIAGNOSIS — S09.90XA INJURY OF HEAD, INITIAL ENCOUNTER: ICD-10-CM

## 2021-02-05 DIAGNOSIS — R51.9 ACHING HEADACHE: ICD-10-CM

## 2021-02-05 PROCEDURE — 70450 CT HEAD/BRAIN W/O DYE: CPT

## 2021-02-05 PROCEDURE — 77067 SCR MAMMO BI INCL CAD: CPT

## 2021-02-10 ENCOUNTER — TELEPHONE (OUTPATIENT)
Dept: PRIMARY CARE CLINIC | Age: 56
End: 2021-02-10

## 2021-02-10 NOTE — TELEPHONE ENCOUNTER
----- Message from JERILYN Lewis sent at 2/9/2021  9:34 PM CST -----  Please let patient know that ct of head results were normal.  Thanks!

## 2021-02-19 ENCOUNTER — TELEPHONE (OUTPATIENT)
Dept: PRIMARY CARE CLINIC | Age: 56
End: 2021-02-19

## 2021-02-19 NOTE — TELEPHONE ENCOUNTER
Pt calls today because she is still having issues with migraines, she says the imitrex is not helping at all and she is having an awful time sleeping due to migraine pain. She states it hurts in the back of her head and radiates to the neck. Pt wanting to know what to do next.  Please advise
Set her up for in office appointment and we will start Emgality or Aimovig for migraines.
n/a

## 2021-02-23 ENCOUNTER — OFFICE VISIT (OUTPATIENT)
Dept: PRIMARY CARE CLINIC | Age: 56
End: 2021-02-23
Payer: MEDICAID

## 2021-02-23 VITALS
BODY MASS INDEX: 29.77 KG/M2 | HEIGHT: 63 IN | SYSTOLIC BLOOD PRESSURE: 138 MMHG | DIASTOLIC BLOOD PRESSURE: 84 MMHG | HEART RATE: 113 BPM | RESPIRATION RATE: 18 BRPM | OXYGEN SATURATION: 98 % | TEMPERATURE: 98.3 F | WEIGHT: 168 LBS

## 2021-02-23 DIAGNOSIS — Z79.899 DRUG THERAPY: ICD-10-CM

## 2021-02-23 DIAGNOSIS — M54.12 CERVICAL RADICULOPATHY: ICD-10-CM

## 2021-02-23 DIAGNOSIS — R51.9 NONINTRACTABLE HEADACHE, UNSPECIFIED CHRONICITY PATTERN, UNSPECIFIED HEADACHE TYPE: Primary | ICD-10-CM

## 2021-02-23 LAB
ALCOHOL URINE: ABNORMAL
AMPHETAMINE SCREEN, URINE: ABNORMAL
BARBITURATE SCREEN, URINE: ABNORMAL
BENZODIAZEPINE SCREEN, URINE: ABNORMAL
BUPRENORPHINE URINE: ABNORMAL
COCAINE METABOLITE SCREEN URINE: ABNORMAL
FENTANYL SCREEN, URINE: ABNORMAL
GABAPENTIN SCREEN, URINE: ABNORMAL
MDMA URINE: ABNORMAL
METHADONE SCREEN, URINE: ABNORMAL
METHAMPHETAMINE, URINE: ABNORMAL
OPIATE SCREEN URINE: ABNORMAL
OXYCODONE SCREEN URINE: ABNORMAL
PHENCYCLIDINE SCREEN URINE: ABNORMAL
PROPOXYPHENE SCREEN, URINE: ABNORMAL
SYNTHETIC CANNABINOIDS(K2) SCREEN, URINE: ABNORMAL
THC SCREEN, URINE: ABNORMAL
TRAMADOL SCREEN URINE: ABNORMAL
TRICYCLIC ANTIDEPRESSANTS, UR: ABNORMAL

## 2021-02-23 PROCEDURE — 80305 DRUG TEST PRSMV DIR OPT OBS: CPT | Performed by: NURSE PRACTITIONER

## 2021-02-23 PROCEDURE — 99214 OFFICE O/P EST MOD 30 MIN: CPT | Performed by: NURSE PRACTITIONER

## 2021-02-23 RX ORDER — KETOROLAC TROMETHAMINE 30 MG/ML
30 INJECTION, SOLUTION INTRAMUSCULAR; INTRAVENOUS ONCE
Status: COMPLETED | OUTPATIENT
Start: 2021-02-23 | End: 2021-02-23

## 2021-02-23 RX ORDER — GALCANEZUMAB 120 MG/ML
INJECTION, SOLUTION SUBCUTANEOUS
Qty: 1 PEN | Refills: 3 | Status: SHIPPED | OUTPATIENT
Start: 2021-02-23 | End: 2021-06-20

## 2021-02-23 RX ORDER — KETOROLAC TROMETHAMINE 30 MG/ML
30 INJECTION, SOLUTION INTRAMUSCULAR; INTRAVENOUS ONCE
Qty: 1 ML | Refills: 0
Start: 2021-02-23 | End: 2021-02-23 | Stop reason: CLARIF

## 2021-02-23 RX ADMIN — KETOROLAC TROMETHAMINE 30 MG: 30 INJECTION, SOLUTION INTRAMUSCULAR; INTRAVENOUS at 11:46

## 2021-02-23 ASSESSMENT — ENCOUNTER SYMPTOMS
RESPIRATORY NEGATIVE: 1
EYES NEGATIVE: 1
GASTROINTESTINAL NEGATIVE: 1

## 2021-02-23 NOTE — PROGRESS NOTES
Pt was provided with sample of Emgality in office today and educated on proper usage. 1 loading dose box Lot # [id number] Exp 4/2022.

## 2021-02-23 NOTE — PROGRESS NOTES
After obtaining consent, and per orders of JERILYN Nash, an injection of Toradol 30mg was given in Right Buttock by [facility]. Patient tolerated well with no immediate adverse reaction. Patient was released with no concerns.

## 2021-02-23 NOTE — PROGRESS NOTES
[address] [facility]  [address] [address] [address]  [address] [address]  Dept: [phone]  Dept Fax: [phone]  Loc: [phone]    Beverley Benites is a 48 y.o. female who presents today for her medical conditions/complaints as noted below. Beverley Benites is c/o of Migraine (2 weeks states her mother sent her pain pills Norco 5mg in the mail and they helped her pt reports her neck is keeing the migraines. Pt was normal in appearance in the lobby until I called her name and she burst into tears patient is grasping head and rocking back and forth today and rubbing her neck. )      Chief Complaint   Patient presents with    Migraine     2 weeks states her mother sent her pain pills Norco 5mg in the mail and they helped her pt reports her neck is keeing the migraines. Pt was normal in appearance in the lobby until I called her name and she burst into tears patient is grasping head and rocking back and forth today and rubbing her neck. HPI:     HPI   Patient is herewith complaints of migraines. She does report that this is the worse pain she has ever had. Patient is rocking back and forth in the room. She reports that none of her meds are helping with the migraine pain. She also believes that her symptoms are likely related to cervical radiculopathy.        Past Medical History:   Diagnosis Date    Anxiety     Arthritis     Chronic tension-type headache, intractable 1/8/2016    Depression     Headache(784.0)     migraine    Lupus ([address]     Neck pain 1/8/2016        Past Surgical History:   Procedure Laterality Date    COLONOSCOPY  10/23/2013    Dr Randi Gross: internal hemorrhoids; hyperplastic polyps (5 yr recal for screening)    HYSTERECTOMY      NECK SURGERY  07/26/2016    Dr Rodriguez Brought FLX DX W/COLLANN Zamarripa 1977 PFRMD N/A 2/6/2017    COLONOSCOPY DIAGNOSTIC OR SCREENING performed by Carmel Muniz DO at [facility]  gabapentin (NEURONTIN) 300 MG capsule TAKE 2 CAPSULES BY MOUTH EVERY 12 HOURS  5    diclofenac sodium 1 % GEL Apply 2 g topically 4 times daily 2 Tube 1    gabapentin (NEURONTIN) 300 MG capsule Take 2 capsules by mouth 3 times daily for 30 days. Intended supply: 30 days 180 capsule 1     No current facility-administered medications for this visit. Allergies   Allergen Reactions    Ventolin [Albuterol] Swelling       Family History   Problem Relation Age of Onset    Colon Cancer Maternal Aunt     Colon Polyps Maternal Aunt     Liver Cancer Neg Hx     Liver Disease Neg Hx     Rectal Cancer Neg Hx     Stomach Cancer Neg Hx                Subjective:      Review of Systems   Constitutional: Negative. HENT: Negative. Eyes: Negative. Respiratory: Negative. Cardiovascular: Negative. Gastrointestinal: Negative. Endocrine: Negative. Genitourinary: Negative. Musculoskeletal: Positive for neck pain. Skin: Negative. Neurological: Positive for headaches. Hematological: Negative. Psychiatric/Behavioral: Negative. Objective:     Physical Exam  Vitals signs and nursing note reviewed. Constitutional:       Appearance: Normal appearance. HENT:      Head: Normocephalic and atraumatic. Jaw: There is normal jaw occlusion. Right Ear: Hearing, tympanic membrane, ear canal and external ear normal.      Left Ear: Hearing, tympanic membrane, ear canal and external ear normal.      Nose: Nose normal.      Mouth/Throat:      Lips: Pink. Mouth: Mucous membranes are moist.      Pharynx: Oropharynx is clear. Eyes:      General: Lids are normal.      Extraocular Movements: Extraocular movements intact. Conjunctiva/sclera: Conjunctivae normal.      Pupils: Pupils are equal, round, and reactive to light. Neck:      Musculoskeletal: Full passive range of motion without pain, normal range of motion and neck supple. Thyroid: No thyromegaly. Trachea: Trachea normal.   Cardiovascular:      Rate and Rhythm: Normal rate and regular rhythm. Pulses: Normal pulses. Dorsalis pedis pulses are 2+ on the right side and 2+ on the left side. Posterior tibial pulses are 2+ on the right side and 2+ on the left side. Heart sounds: Normal heart sounds. No murmur. Pulmonary:      Effort: Pulmonary effort is normal.      Breath sounds: Normal breath sounds and air entry. Abdominal:      General: Bowel sounds are normal.      Palpations: Abdomen is soft. Musculoskeletal:      Cervical back: She exhibits normal range of motion. Thoracic back: She exhibits normal range of motion. Lumbar back: She exhibits normal range of motion. Right lower leg: No edema. Left lower leg: No edema. Lymphadenopathy:      Cervical: No cervical adenopathy. Skin:     General: Skin is warm and dry. Capillary Refill: Capillary refill takes less than 2 seconds. Neurological:      General: No focal deficit present. Mental Status: She is alert and oriented to person, place, and time. Mental status is at baseline. Psychiatric:         Attention and Perception: Attention normal.         Mood and Affect: Mood is depressed. Affect is tearful. Speech: Speech normal.         Behavior: Behavior normal.         Thought Content: Thought content normal.         Cognition and Memory: Cognition normal.         Judgment: Judgment normal.      Comments: Rocking back and forth during exam         /84   Pulse 113   Temp 98.3 °F (36.8 °C) (Temporal)   Resp 18   Ht 5' 3\" (1.6 m)   Wt 168 lb (76.2 kg)   SpO2 98%   BMI 29.76 kg/m²     Assessment:      Diagnosis Orders   1. Nonintractable headache, unspecified chronicity pattern, unspecified headache type  ketorolac (TORADOL) injection 30 mg   2. Drug therapy  POCT Rapid Drug Screen   3.  Cervical radiculopathy  External Referral To Neurosurgery Results for orders placed or performed in visit on 21   POCT Rapid Drug Screen   Result Value Ref Range    Alcohol, Urine -     Amphetamine Screen, Urine -     Barbiturate Screen, Urine -     Benzodiazepine Screen, Urine +     Buprenorphine Urine -     Cocaine Metabolite Screen, Urine -     FENTANYL SCREEN, URINE -     Gabapentin Screen, Urine -     MDMA, Urine -     Methadone Screen, Urine -     Methamphetamine, Urine -     Opiate Scrn, Ur +     Oxycodone Screen, Ur -     PCP Screen, Urine -     Propoxyphene Screen, Urine -     Synthetic Cannabinoids (K2) Screen, Urine -     THC Screen, Urine -     Tramadol Scrn, Ur -     Tricyclic Antidepressants, Urine -        Plan:     Referral to neurosurgery per patient request.   Toradol injectio in office for migraine pain. Return if symptoms worsen or fail to improve. Orders Placed This Encounter   Procedures    External Referral To Neurosurgery     Referral Priority:   Routine     Referral Type:   Eval and Treat     Referral Reason:   Specialty Services Required     Requested Specialty:   Neurosurgery     Number of Visits Requested:   1    POCT Rapid Drug Screen       Orders Placed This Encounter   Medications    DISCONTD: ketorolac (TORADOL) 30 MG/ML injection     Sig: Inject 1 mL into the muscle once for 1 dose     Dispense:  1 mL     Refill:  0    Galcanezumab-gnlm (EMGALITY) 120 MG/ML SOAJ     Si injection every 30 days     Dispense:  1 pen     Refill:  3    ketorolac (TORADOL) injection 30 mg        Patient offered educational handouts and has had all questions answered. Patient voices understanding and agrees to plans along with risks and benefits of plan. Patient is instructed to continue prior meds, diet, and exercise plans as instructed. Patient agrees to follow up as instructed and sooner if needed. Patient agrees to go to ER if condition becomes emergent. EMR Dragon/transcription disclaimer: Some of this encounter note is an electronic transcription/translation of spoken language to printed text. The electronic translation of spoken language may permit erroneous, or at times, nonsensical words or phrases to be inadvertently transcribed.  Although I have reviewed the note for such errors, some may still exist.    Electronically signed by JERILYN Moore on 2/23/2021 at 2:14 PM

## 2021-03-09 ENCOUNTER — TELEPHONE (OUTPATIENT)
Dept: PRIMARY CARE CLINIC | Age: 56
End: 2021-03-09

## 2021-03-09 NOTE — TELEPHONE ENCOUNTER
Pt called requesting PA be submitted to insurance for [facility]. I have attempted to submit PA to [facility] that we have on file and it states pt is inactive. I have attempted to reach pt and was unable to LM.

## 2021-03-09 NOTE — TELEPHONE ENCOUNTER
Pt called back and has been notified she got ugly and said yes I have insurance I told the pt not to get angry with me that I have submitted the PA 4 different ways for her and each time the results were the same she did not have active coverage. I told pt she would need to reach out to insurance to find out what is going on.

## 2021-03-20 DIAGNOSIS — F41.9 ANXIETY: ICD-10-CM

## 2021-03-21 RX ORDER — SUCRALFATE 1 G/1
TABLET ORAL
Qty: 180 TABLET | Refills: 0 | Status: SHIPPED | OUTPATIENT
Start: 2021-03-21

## 2021-03-21 RX ORDER — CITALOPRAM 40 MG/1
TABLET ORAL
Qty: 90 TABLET | Refills: 0 | Status: SHIPPED | OUTPATIENT
Start: 2021-03-21

## 2021-03-21 RX ORDER — BUSPIRONE HYDROCHLORIDE 10 MG/1
TABLET ORAL
Qty: 180 TABLET | Refills: 0 | Status: SHIPPED | OUTPATIENT
Start: 2021-03-21

## 2021-03-21 RX ORDER — SIMVASTATIN 10 MG
10 TABLET ORAL NIGHTLY
Qty: 90 TABLET | Refills: 0 | Status: SHIPPED | OUTPATIENT
Start: 2021-03-21

## 2021-04-06 ENCOUNTER — TRANSCRIBE ORDERS (OUTPATIENT)
Dept: LAB | Facility: HOSPITAL | Age: 56
End: 2021-04-06

## 2021-04-06 ENCOUNTER — LAB (OUTPATIENT)
Dept: LAB | Facility: HOSPITAL | Age: 56
End: 2021-04-06

## 2021-04-06 DIAGNOSIS — Z01.818 PREOPERATIVE TESTING: ICD-10-CM

## 2021-04-06 DIAGNOSIS — Z01.818 PREOPERATIVE TESTING: Primary | ICD-10-CM

## 2021-04-06 LAB — SARS-COV-2 ORF1AB RESP QL NAA+PROBE: NOT DETECTED

## 2021-04-06 PROCEDURE — U0004 COV-19 TEST NON-CDC HGH THRU: HCPCS

## 2021-04-06 PROCEDURE — C9803 HOPD COVID-19 SPEC COLLECT: HCPCS

## 2021-04-16 ENCOUNTER — TELEPHONE (OUTPATIENT)
Dept: NEUROLOGY | Age: 56
End: 2021-04-16

## 2021-04-16 NOTE — TELEPHONE ENCOUNTER
Received a referral from Dr. Nato Logan office for this patient. Called and left pt a VM regarding the appointment time/date/location/phone #.

## 2021-04-21 ENCOUNTER — TRANSCRIBE ORDERS (OUTPATIENT)
Dept: ADMINISTRATIVE | Facility: HOSPITAL | Age: 56
End: 2021-04-21

## 2021-04-21 DIAGNOSIS — Z01.818 PREOP TESTING: Primary | ICD-10-CM

## 2021-04-23 ENCOUNTER — LAB (OUTPATIENT)
Dept: LAB | Facility: HOSPITAL | Age: 56
End: 2021-04-23

## 2021-04-23 LAB — SARS-COV-2 ORF1AB RESP QL NAA+PROBE: NOT DETECTED

## 2021-04-23 PROCEDURE — C9803 HOPD COVID-19 SPEC COLLECT: HCPCS | Performed by: PAIN MEDICINE

## 2021-04-23 PROCEDURE — U0004 COV-19 TEST NON-CDC HGH THRU: HCPCS | Performed by: PAIN MEDICINE

## 2021-05-05 ENCOUNTER — TELEPHONE (OUTPATIENT)
Dept: NEUROSURGERY | Facility: CLINIC | Age: 56
End: 2021-05-05

## 2021-05-05 NOTE — TELEPHONE ENCOUNTER
PT CONFIRMED APT WITH JIA LACY FOR 05/06/21 @ 2:45    PT IMAGING TO BE SHARED FROM [facility]

## 2021-05-06 ENCOUNTER — OFFICE VISIT (OUTPATIENT)
Dept: NEUROSURGERY | Facility: CLINIC | Age: 56
End: 2021-05-06

## 2021-05-06 ENCOUNTER — HOSPITAL ENCOUNTER (OUTPATIENT)
Dept: GENERAL RADIOLOGY | Facility: HOSPITAL | Age: 56
Discharge: HOME | End: 2021-05-06
Admitting: NURSE PRACTITIONER

## 2021-05-06 VITALS
BODY MASS INDEX: 31.18 KG/M2 | DIASTOLIC BLOOD PRESSURE: 74 MMHG | WEIGHT: 176 LBS | SYSTOLIC BLOOD PRESSURE: 120 MMHG | HEIGHT: 63 IN

## 2021-05-06 DIAGNOSIS — Z78.9 ELECTRONIC CIGARETTE USE: ICD-10-CM

## 2021-05-06 DIAGNOSIS — M54.2 CERVICALGIA: Primary | ICD-10-CM

## 2021-05-06 DIAGNOSIS — E66.09 CLASS 1 OBESITY DUE TO EXCESS CALORIES WITH BODY MASS INDEX (BMI) OF 31.0 TO 31.9 IN ADULT, UNSPECIFIED WHETHER SERIOUS COMORBIDITY PRESENT: ICD-10-CM

## 2021-05-06 PROBLEM — Z87.891 FORMER SMOKER: Status: ACTIVE | Noted: 2021-05-06

## 2021-05-06 PROCEDURE — 99204 OFFICE O/P NEW MOD 45 MIN: CPT | Performed by: NURSE PRACTITIONER

## 2021-05-06 PROCEDURE — 72052 X-RAY EXAM NECK SPINE 6/>VWS: CPT

## 2021-05-06 NOTE — PROGRESS NOTES
Chief complaint:   Chief Complaint   Patient presents with   • Neck Pain     Concepcion has been referred here today for follow up for long history of neck pain with bilateral arm and bilateral leg pain.  She had imaging done at [facility] and they have been called to upload for review.  She has been treating with Dr. Patricia Garcia for pain management but is not happy with her treatment.  She has no recent physical therapy.        Subjective     HPI: This is a 55-year-old female patient who had previously been seen by Dr. Gilmar Cameron.  The patient had a C5-6, 6-7 ACDF by Dr. Cameron in July 2016 for neck and arm pain.  She states that after that surgery she had 80% improvement in her pain issues.  She was noted to see Dr. Major in 2018 for neck issues.  She says the pain in her neck is intermittent.  It is worse with certain positions and sometimes getting her neck in the wrong position can cause her pain for the rest of the month.  Nothing makes it better.  She has pain in her arms bilaterally that goes down in a radicular fashion to her hands.  She says it is worse in her hands.  She says her neck is 60% in her arms is 40%.  Denies any bowel or bladder incontinence.  She has not done any recent physical therapy or chiropractic care.  She is working with Dr. Patricia Garcia and states that she has had 1 injection in her neck that only lasted for about 1 day for helping improve her neck pain.  She says she is only getting Norco 7.53 a day.  She is right-hand dominant.  She has not worked in 2 years and is currently on disability for her neck issues.  She does vape.  Denies any alcohol or illicit drug use.  Rates her pain on a scale 0-10 at an 8.  She says it does interfere with actives of daily living    Review of Systems   Neurological: Positive for headaches.   All other systems reviewed and are negative.       Past Medical History:   Diagnosis Date   • Anxiety    • Anxiety disorder    • Cervical pain    •  "Cervical radiculopathy    • Cervicalgia    • Cigarette nicotine dependence, uncomplicated    • DDD (degenerative disc disease), cervical    • H/O screening mammography    • Headache    • Lipid screening    • Low back pain    • Migraines    • Obesity      Past Surgical History:   Procedure Laterality Date   • NECK SURGERY      Dr. Gilmar Cameron      Family History   Problem Relation Age of Onset   • No Known Problems Mother    • No Known Problems Father    • No Known Problems Brother    • No Known Problems Brother      Social History     Tobacco Use   • Smoking status: Former Smoker     Packs/day: 1.00   • Smokeless tobacco: Never Used   Substance Use Topics   • Alcohol use: No   • Drug use: Never     (Not in a hospital admission)    Allergies:  Albuterol    Objective      Vital Signs  /74   Ht 160 cm (63\")   Wt 79.8 kg (176 lb)   BMI 31.18 kg/m²     Physical Exam  Constitutional:       Appearance: Normal appearance. She is well-developed.   HENT:      Head: Normocephalic.   Eyes:      General: Lids are normal.      Conjunctiva/sclera: Conjunctivae normal.      Pupils: Pupils are equal, round, and reactive to light.   Cardiovascular:      Rate and Rhythm: Normal rate and regular rhythm.   Pulmonary:      Effort: Pulmonary effort is normal.      Breath sounds: Normal breath sounds.   Musculoskeletal:         General: Normal range of motion.      Cervical back: Normal range of motion.   Skin:     General: Skin is warm.   Neurological:      Mental Status: She is alert and oriented to person, place, and time.      GCS: GCS eye subscore is 4. GCS verbal subscore is 5. GCS motor subscore is 6.      Cranial Nerves: No cranial nerve deficit.      Sensory: No sensory deficit.      Deep Tendon Reflexes: Reflexes are normal and symmetric. Reflexes normal.   Psychiatric:         Speech: Speech normal.         Behavior: Behavior normal.         Thought Content: Thought content normal.         Neurologic Exam     Mental " Status   Oriented to person, place, and time.   Speech: speech is normal     Cranial Nerves     CN III, IV, VI   Pupils are equal, round, and reactive to light.      Results Review: MRI of the cervical spine that was done on November 2019 shows the patient has had a previous ACDF from C5-C7.  There is right-sided foraminal narrowing at C4-5 and C3-4.  No cord signal change.  No malalignment        Assessment/Plan: I am going to send the patient for set of x-rays of the cervical spine to include standing flexion and extension  For first line conservative care of cervical pain, I would like to send Ms. Sanabria for a dedicated course of physician directed physical therapy consisting of 2-3 times a week for 4-6 weeks.    Return for reassessment with me after 6-8 weeks after physical therapy.     Should Ms. Sanabria not have any improvement from physical therapy, I think it would be prudent to send the patient for an MRI of the cervical spine to see if there is anything from a surgical standpoint that needs to be addressed.     I advised the patient to call and return sooner for new or worsening complaints of weakness, paresthesias, gait disturbances, or any additional concerns.  Treatment options discussed in detail with Concepcion and the patient voiced understanding.  Ms. Sanabria agrees with this plan of care.     Patient is a smoker. Smoking cessation classes given to the patient  The patient's Body mass index is 31.18 kg/m².. BMI is above normal parameters. Recommendations include: educational material and nutrition counseling    Diagnoses and all orders for this visit:    1. Cervicalgia (Primary)  -     XR Spine Cervical Complete With Obli Flex Ext  -     Ambulatory Referral to Physical Therapy Neuro, Evaluate and treat; Strengthening, Stretching; Full weight bearing    2. Electronic cigarette use    3. Class 1 obesity due to excess calories with body mass index (BMI) of 31.0 to 31.9 in adult, unspecified whether serious  comorbidity present          I discussed the patients findings and my recommendations with patient    Joey Alvarado, APRN  05/06/21  15:48 CDT

## 2021-05-06 NOTE — PATIENT INSTRUCTIONS
"BMI for Adults  What is BMI?  Body mass index (BMI) is a number that is calculated from a person's weight and height. BMI can help estimate how much of a person's weight is composed of fat. BMI does not measure body fat directly. Rather, it is an alternative to procedures that directly measure body fat, which can be difficult and expensive.  BMI can help identify people who may be at higher risk for certain medical problems.  What are BMI measurements used for?  BMI is used as a screening tool to identify possible weight problems. It helps determine whether a person is obese, overweight, a healthy weight, or underweight.  BMI is useful for:  · Identifying a weight problem that may be related to a medical condition or may increase the risk for medical problems.  · Promoting changes, such as changes in diet and exercise, to help reach a healthy weight. BMI screening can be repeated to see if these changes are working.  How is BMI calculated?  BMI involves measuring your weight in relation to your height. Both height and weight are measured, and the BMI is calculated from those numbers. This can be done either in English (U[place] or metric measurements. Note that charts and online BMI calculators are available to help you find your BMI quickly and easily without having to do these calculations yourself.  To calculate your BMI in English (U.S[place] measurements:    1. Measure your weight in pounds (lb).  2. Multiply the number of pounds by 703.  ? For example, for a person who weighs 180 lb, multiply that number by 703, which equals 126,540.  3. Measure your height in inches. Then multiply that number by itself to get a measurement called \"inches squared.\"  ? For example, for a person who is 70 inches tall, the \"inches squared\" measurement is 70 inches x 70 inches, which equals 4,900 inches squared.  4. Divide the total from step 2 (number of lb x 703) by the total from step 3 (inches squared): 126,540 ÷ 4,900 = 25.8. This is " "your BMI.  To calculate your BMI in metric measurements:  1. Measure your weight in kilograms (kg).  2. Measure your height in meters (m). Then multiply that number by itself to get a measurement called \"meters squared.\"  ? For example, for a person who is 1.75 m tall, the \"meters squared\" measurement is 1.75 m x 1.75 m, which is equal to 3.1 meters squared.  3. Divide the number of kilograms (your weight) by the meters squared number. In this example: 70 ÷ 3.1 = 22.6. This is your BMI.  What do the results mean?  BMI charts are used to identify whether you are underweight, normal weight, overweight, or obese. The following guidelines will be used:  · Underweight: BMI less than 18.5.  · Normal weight: BMI between 18.5 and 24.9.  · Overweight: BMI between 25 and 29.9.  · Obese: BMI of 30 or above.  Keep these notes in mind:  · Weight includes both fat and muscle, so someone with a muscular build, such as an athlete, may have a BMI that is higher than 24.9. In cases like these, BMI is not an accurate measure of body fat.  · To determine if excess body fat is the cause of a BMI of 25 or higher, further assessments may need to be done by a health care provider.  · BMI is usually interpreted in the same way for men and women.  Where to find more information  For more information about BMI, including tools to quickly calculate your BMI, go to these websites:  · Centers for Disease Control and Prevention: www.cdc.gov  · American Heart Association: www.heart.org  · National Heart, Lung, and Blood [place]: www.nhlbi.nih.gov  Summary  · Body mass index (BMI) is a number that is calculated from a person's weight and height.  · BMI may help estimate how much of a person's weight is composed of fat. BMI can help identify those who may be at higher risk for certain medical problems.  · BMI can be measured using English measurements or metric measurements.  · BMI charts are used to identify whether you are underweight, normal " "weight, overweight, or obese.  This information is not intended to replace advice given to you by your health care provider. Make sure you discuss any questions you have with your health care provider.  Document Revised: 09/09/2020 Document Reviewed: 07/17/2020  Andrea Patient Education © 2021 [employer]      https://www.nhlbi.nih.gov/files/docs/public/heart/dash_brief.pdf\">   DASH Eating Plan  DASH stands for Dietary Approaches to Stop Hypertension. The DASH eating plan is a healthy eating plan that has been shown to:  · Reduce high blood pressure (hypertension).  · Reduce your risk for type 2 diabetes, heart disease, and stroke.  · Help with weight loss.  What are tips for following this plan?  Reading food labels  · Check food labels for the amount of salt (sodium) per serving. Choose foods with less than 5 percent of the Daily Value of sodium. Generally, foods with less than 300 milligrams (mg) of sodium per serving fit into this eating plan.  · To find whole grains, look for the word \"whole\" as the first word in the ingredient list.  Shopping  · Buy products labeled as \"low-sodium\" or \"no salt added.\"  · Buy fresh foods. Avoid canned foods and pre-made or frozen meals.  Cooking  · Avoid adding salt when cooking. Use salt-free seasonings or herbs instead of table salt or sea salt. Check with your health care provider or pharmacist before using salt substitutes.  · Do not lozoya foods. Cook foods using healthy methods such as baking, boiling, grilling, roasting, and broiling instead.  · Cook with heart-healthy oils, such as olive, canola, avocado, soybean, or sunflower oil.  Meal planning    · Eat a balanced diet that includes:  ? 4 or more servings of fruits and 4 or more servings of vegetables each day. Try to fill one-half of your plate with fruits and vegetables.  ? 6-8 servings of whole grains each day.  ? Less than 6 oz (170 g) of lean meat, poultry, or fish each day. A 3-oz (85-g) serving of meat is " about the same size as a deck of cards. One egg equals 1 oz (28 g).  ? 2-3 servings of low-fat dairy each day. One serving is 1 cup (237 mL).  ? 1 serving of nuts, seeds, or beans 5 times each week.  ? 2-3 servings of heart-healthy fats. Healthy fats called omega-3 fatty acids are found in foods such as walnuts, flaxseeds, fortified milks, and eggs. These fats are also found in cold-water fish, such as sardines, salmon, and mackerel.  · Limit how much you eat of:  ? Canned or prepackaged foods.  ? Food that is high in trans fat, such as some fried foods.  ? Food that is high in saturated fat, such as fatty meat.  ? Desserts and other sweets, sugary drinks, and other foods with added sugar.  ? Full-fat dairy products.  · Do not salt foods before eating.  · Do not eat more than 4 egg yolks a week.  · Try to eat at least 2 vegetarian meals a week.  · Eat more home-cooked food and less restaurant, buffet, and fast food.  Lifestyle  · When eating at a restaurant, ask that your food be prepared with less salt or no salt, if possible.  · If you drink alcohol:  ? Limit how much you use to:  § 0-1 drink a day for women who are not pregnant.  § 0-2 drinks a day for men.  ? Be aware of how much alcohol is in your drink. In the [place] one drink equals one 12 oz bottle of beer (355 mL), one 5 oz glass of wine (148 mL), or one 1½ oz glass of hard liquor (44 mL).  General information  · Avoid eating more than 2,300 mg of salt a day. If you have hypertension, you may need to reduce your sodium intake to 1,500 mg a day.  · Work with your health care provider to maintain a healthy body weight or to lose weight. Ask what an ideal weight is for you.  · Get at least 30 minutes of exercise that causes your heart to beat faster (aerobic exercise) most days of the week. Activities may include walking, swimming, or biking.  · Work with your health care provider or dietitian to adjust your eating plan to your individual calorie needs.  What  foods should I eat?  Fruits  All fresh, dried, or frozen fruit. Canned fruit in natural juice (without added sugar).  Vegetables  Fresh or frozen vegetables (raw, steamed, roasted, or grilled). Low-sodium or reduced-sodium tomato and vegetable juice. Low-sodium or reduced-sodium tomato sauce and tomato paste. Low-sodium or reduced-sodium canned vegetables.  Grains  Whole-grain or whole-wheat bread. Whole-grain or whole-wheat pasta. Brown rice. Oatmeal. Quinoa. Bulgur. Whole-grain and low-sodium cereals. Tavia bread. Low-fat, low-sodium crackers. Whole-wheat flour tortillas.  Meats and other proteins  Skinless chicken or turkey. Ground chicken or turkey. Pork with fat trimmed off. Fish and seafood. Egg whites. Dried beans, peas, or lentils. Unsalted nuts, nut butters, and seeds. Unsalted canned beans. Lean cuts of beef with fat trimmed off. Low-sodium, lean precooked or cured meat, such as sausages or meat loaves.  Dairy  Low-fat (1%) or fat-free (skim) milk. Reduced-fat, low-fat, or fat-free cheeses. Nonfat, low-sodium ricotta or cottage cheese. Low-fat or nonfat yogurt. Low-fat, low-sodium cheese.  Fats and oils  Soft margarine without trans fats. Vegetable oil. Reduced-fat, low-fat, or light mayonnaise and salad dressings (reduced-sodium). Canola, safflower, olive, avocado, soybean, and sunflower oils. Avocado.  Seasonings and condiments  Herbs. Spices. Seasoning mixes without salt.  Other foods  Unsalted popcorn and pretzels. Fat-free sweets.  The items listed above may not be a complete list of foods and beverages you can eat. Contact a dietitian for more information.  What foods should I avoid?  Fruits  Canned fruit in a light or heavy syrup. Fried fruit. Fruit in cream or butter sauce.  Vegetables  Creamed or fried vegetables. Vegetables in a cheese sauce. Regular canned vegetables (not low-sodium or reduced-sodium). Regular canned tomato sauce and paste (not low-sodium or reduced-sodium). Regular tomato and  vegetable juice (not low-sodium or reduced-sodium). Pickles. Olives.  Grains  Baked goods made with fat, such as croissants, muffins, or some breads. Dry pasta or rice meal packs.  Meats and other proteins  Fatty cuts of meat. Ribs. Fried meat. Knox. Bologna, salami, and other precooked or cured meats, such as sausages or meat loaves. Fat from the back of a pig (fatback). Bratwurst. Salted nuts and seeds. Canned beans with added salt. Canned or smoked fish. Whole eggs or egg yolks. Chicken or turkey with skin.  Dairy  Whole or 2% milk, cream, and half-and-half. Whole or full-fat cream cheese. Whole-fat or sweetened yogurt. Full-fat cheese. Nondairy creamers. Whipped toppings. Processed cheese and cheese spreads.  Fats and oils  Butter. Stick margarine. Lard. Shortening. Ghee. Knox fat. Tropical oils, such as coconut, palm kernel, or palm oil.  Seasonings and condiments  Onion salt, garlic salt, seasoned salt, table salt, and sea salt. Worcestershire sauce. Tartar sauce. Barbecue sauce. Teriyaki sauce. Soy sauce, including reduced-sodium. Steak sauce. Canned and packaged gravies. Fish sauce. Oyster sauce. Cocktail sauce. Store-bought horseradish. Ketchup. Mustard. Meat flavorings and tenderizers. Bouillon cubes. Hot sauces. Pre-made or packaged marinades. Pre-made or packaged taco seasonings. Relishes. Regular salad dressings.  Other foods  Salted popcorn and pretzels.  The items listed above may not be a complete list of foods and beverages you should avoid. Contact a dietitian for more information.  Where to find more information  · National Heart, Lung, and Blood [place]: www.nhlbi.nih.gov  · American Heart Association: www.heart.org  · Academy of Nutrition and Dietetics: www.eatright.org  · National Kidney Foundation: www.kidney.org  Summary  · The DASH eating plan is a healthy eating plan that has been shown to reduce high blood pressure (hypertension). It may also reduce your risk for type 2 diabetes, heart  disease, and stroke.  · When on the DASH eating plan, aim to eat more fresh fruits and vegetables, whole grains, lean proteins, low-fat dairy, and heart-healthy fats.  · With the DASH eating plan, you should limit salt (sodium) intake to 2,300 mg a day. If you have hypertension, you may need to reduce your sodium intake to 1,500 mg a day.  · Work with your health care provider or dietitian to adjust your eating plan to your individual calorie needs.  This information is not intended to replace advice given to you by your health care provider. Make sure you discuss any questions you have with your health care provider.  Document Revised: 11/20/2020 Document Reviewed: 11/20/2020  Elsevier Patient Education © 2021 [employer] Inc.

## 2021-05-07 ENCOUNTER — TELEPHONE (OUTPATIENT)
Dept: NEUROSURGERY | Facility: CLINIC | Age: 56
End: 2021-05-07

## 2021-05-07 ENCOUNTER — VIRTUAL VISIT (OUTPATIENT)
Dept: PRIMARY CARE CLINIC | Age: 56
End: 2021-05-07
Payer: MEDICAID

## 2021-05-07 DIAGNOSIS — J18.9 PNEUMONIA OF LEFT UPPER LOBE DUE TO INFECTIOUS ORGANISM: ICD-10-CM

## 2021-05-07 DIAGNOSIS — R07.89 LEFT-SIDED CHEST WALL PAIN: Primary | ICD-10-CM

## 2021-05-07 DIAGNOSIS — J18.9 ATYPICAL PNEUMONIA: Primary | ICD-10-CM

## 2021-05-07 PROCEDURE — 99443 PR PHYS/QHP TELEPHONE EVALUATION 21-30 MIN: CPT | Performed by: NURSE PRACTITIONER

## 2021-05-07 RX ORDER — PREDNISONE 10 MG/1
10 TABLET ORAL DAILY
Qty: 7 TABLET | Refills: 0 | Status: SHIPPED | OUTPATIENT
Start: 2021-05-07 | End: 2021-05-14

## 2021-05-07 RX ORDER — AMOXICILLIN AND CLAVULANATE POTASSIUM 875; 125 MG/1; MG/1
1 TABLET, FILM COATED ORAL 2 TIMES DAILY
Qty: 20 TABLET | Refills: 0 | Status: SHIPPED | OUTPATIENT
Start: 2021-05-07 | End: 2021-05-17

## 2021-05-07 ASSESSMENT — ENCOUNTER SYMPTOMS
RESPIRATORY NEGATIVE: 1
ABDOMINAL PAIN: 1
EYES NEGATIVE: 1

## 2021-05-07 NOTE — PROGRESS NOTES
[facility]   [address] [place] [address]            Phone:  [phone]  Fax:  [phone]    Pepito Nguyen is a 48 y.o. female evaluated via telephone on 5/7/2021. Consent:    She and/or health care decision maker is aware that that she may receive a bill for this telephone service, depending on her insurance coverage, and has provided verbal consent to proceed: Yes      HPI  Chief Complaint   Patient presents with    Hip Pain    Other       I communicated with the patient and/or health care decision maker about Pt reports L sided pain. She states she was seen in [facility] ED for this and she was still hurting when she scheduled this appointment pain is still present. Patient is having issues sleeping at night because of it. Pt states that she also had an xray recently and was told that she possibly had pneumonia and needed to mention that today during her visit. Pt is very tearful over the phone and states she does not have transportation to get to her visits because her son is mad at her and will not allow his girlfriend to give her a ride anywhere. Pt was instructed to reach out to transportation services with her insurance and they will help her get to and from her appointments. She reports being told by the ER that they could not find out what was wrong with her and gave her azithromycin and medrol dose pack. Review of Systems   Constitutional: Positive for fatigue. HENT: Negative. Eyes: Negative. Respiratory: Negative. Cardiovascular: Negative. Gastrointestinal: Positive for abdominal pain. Endocrine: Negative. Genitourinary: Negative. Musculoskeletal: Negative. Skin: Negative. Neurological: Negative. Hematological: Negative. Psychiatric/Behavioral: Negative. PLAN    Details of this discussion including any medical advice provided:     1. Left-sided chest wall pain      2.  Pneumonia of left upper lobe due to infectious organism    - amoxicillin-clavulanate (AUGMENTIN) 875-125 MG per tablet; Take 1 tablet by mouth 2 times daily for 10 days  Dispense: 20 tablet; Refill: 0  - predniSONE (DELTASONE) 10 MG tablet; Take 1 tablet by mouth daily for 7 days  Dispense: 7 tablet; Refill: 0       Patient states that she is unable to come in for a true chest x-ray. I am going to treat her like she has been based on the cervical spine x-ray that was completed at [facility] and they question pneumonia. I explained to the patient the importance of following through with this imaging and if symptoms worsen she needs to call 911 since getting to her appointments is difficult. I affirm this is a Patient Initiated Episode with an Established Patient who has not had a related appointment within my department in the past 7 days or scheduled within the next 24 hours. Total Time: minutes: 21-30 minutes      Note: not billable if this call serves to triage the patient into an appointment for the relevant concern      Bonny Hyman to the emergency declaration under the [facility] waiver authority and the Car reviews and [employer] Act, this Virtual  Visit was conducted, with patient's consent, to reduce the patient's risk of exposure to COVID-19 and provide continuity of care for an established patient. Services were provided through a telephone discussion  to substitute for in-person clinic visit. Parties involved during telephone call include myself, JERILYN Nevarez and patient listed.

## 2021-05-07 NOTE — TELEPHONE ENCOUNTER
Call patient and gave her the results of the x-rays of her cervical spine.  Instructed the patient that she needs to have a chest x-ray done.  She said that she will try and get the x-ray done and will follow up with her with the results.  Patient denies any fevers chills or night sweats or coughing

## 2021-05-13 ENCOUNTER — TELEPHONE (OUTPATIENT)
Dept: PRIMARY CARE CLINIC | Age: 56
End: 2021-05-13

## 2021-05-13 ENCOUNTER — TELEPHONE (OUTPATIENT)
Dept: NEUROSURGERY | Facility: CLINIC | Age: 56
End: 2021-05-13

## 2021-05-13 DIAGNOSIS — R09.89 CHEST CONGESTION: Primary | ICD-10-CM

## 2021-05-13 NOTE — TELEPHONE ENCOUNTER
----- Message from REGINO Figueredo sent at 5/13/2021  9:25 AM CDT -----  I ordered a chest x-ray for her and called her to have it done.  I do not see where it has been done yet.  Can you call and talk to her and see when she is planning on having the x-ray done  ----- Message -----  From: Chelsey [facility] [place] [facility]  Sent: 5/6/2021   4:51 PM CDT  To: REGINO Figueredo

## 2021-05-13 NOTE — TELEPHONE ENCOUNTER
Joey had recently ordered a chest x-ray for the patient but has not yet had it done, I called patient and spoke with her, she states that on the advise of her PCP Chloé Miller, she is to first complete the antibiotics she has her on and then she will see her for follow up.

## 2021-05-13 NOTE — TELEPHONE ENCOUNTER
Pt called and states she has been taking abx but feels she needs a chest xray. Please advise. Thank you.

## 2021-05-17 ENCOUNTER — HOSPITAL ENCOUNTER (OUTPATIENT)
Dept: GENERAL RADIOLOGY | Age: 56
Discharge: HOME | End: 2021-05-17
Payer: MEDICAID

## 2021-05-17 ENCOUNTER — TELEPHONE (OUTPATIENT)
Dept: PRIMARY CARE CLINIC | Age: 56
End: 2021-05-17

## 2021-05-17 DIAGNOSIS — R09.89 CHEST CONGESTION: ICD-10-CM

## 2021-05-17 PROCEDURE — 71046 X-RAY EXAM CHEST 2 VIEWS: CPT

## 2021-05-17 NOTE — TELEPHONE ENCOUNTER
Pt called back and LM on VM stating she was feeling nauseated today and wanted to know if that had anything to do with her having pneumonia. I attempted to reach pt back to see if she is still taking abx and if she is eating prior to taking them and LM requesting CB.

## 2021-05-17 NOTE — TELEPHONE ENCOUNTER
Pt states she was supposed to go help some people today and is requesting a drs excuse since she is going to get her xray completed today due to pneumonia. I informed pt I would have to discuss this with provider as we typically do not provide excuses unless the pt was seen in the office the same day and pt GIOVANY.

## 2021-05-18 RX ORDER — ONDANSETRON 4 MG/1
4 TABLET, ORALLY DISINTEGRATING ORAL EVERY 8 HOURS PRN
Qty: 20 TABLET | Refills: 0 | Status: SHIPPED | OUTPATIENT
Start: 2021-05-18

## 2021-05-18 RX ORDER — CIPROFLOXACIN 500 MG/1
500 TABLET, FILM COATED ORAL 2 TIMES DAILY
Qty: 14 TABLET | Refills: 0 | Status: ON HOLD | OUTPATIENT
Start: 2021-05-18 | End: 2021-05-28 | Stop reason: HOSPADM

## 2021-05-18 NOTE — TELEPHONE ENCOUNTER
It appears she still has the pneumonia based on the xray results. I am sending in another oral abx. Her symptoms are likely related to that. Also sending in Zofran for her.

## 2021-05-19 ENCOUNTER — TELEPHONE (OUTPATIENT)
Dept: PRIMARY CARE CLINIC | Age: 56
End: 2021-05-19

## 2021-05-19 NOTE — TELEPHONE ENCOUNTER
Pt called and left message on VM wanting to know why Vinetta Soulier has not put her in the hospital due to the pneumonia. Per JERILYN Walden pt was notified if she felt she needed to be in the hospital she needed to go to the ED to be evaluated and they would determine if pt needs to be admitted. I called pt and notified her of this and she VU.

## 2021-05-23 ENCOUNTER — HOSPITAL ENCOUNTER (INPATIENT)
Age: 56
LOS: 5 days | Discharge: HOME HEALTH | DRG: 189 | End: 2021-05-28
Attending: EMERGENCY MEDICINE | Admitting: INTERNAL MEDICINE
Payer: MEDICAID

## 2021-05-23 ENCOUNTER — APPOINTMENT (OUTPATIENT)
Dept: CT IMAGING | Age: 56
DRG: 189 | End: 2021-05-23
Payer: MEDICAID

## 2021-05-23 ENCOUNTER — APPOINTMENT (OUTPATIENT)
Dept: GENERAL RADIOLOGY | Age: 56
DRG: 189 | End: 2021-05-23
Payer: MEDICAID

## 2021-05-23 DIAGNOSIS — J96.01 ACUTE RESPIRATORY FAILURE WITH HYPOXIA (HCC): ICD-10-CM

## 2021-05-23 DIAGNOSIS — J90 PLEURAL EFFUSION: ICD-10-CM

## 2021-05-23 DIAGNOSIS — R93.89 ABNORMAL CHEST CT: ICD-10-CM

## 2021-05-23 DIAGNOSIS — G89.29 OTHER CHRONIC PAIN: ICD-10-CM

## 2021-05-23 DIAGNOSIS — J18.9 PNEUMONIA DUE TO ORGANISM: Primary | ICD-10-CM

## 2021-05-23 LAB
ADENOVIRUS BY PCR: NOT DETECTED
ALBUMIN SERPL-MCNC: 3.2 G/DL (ref 3.5–5.2)
ALP BLD-CCNC: 107 U/L (ref 35–104)
ALT SERPL-CCNC: 6 U/L (ref 5–33)
ANION GAP SERPL CALCULATED.3IONS-SCNC: 13 MMOL/L (ref 7–19)
APPEARANCE FLUID: NORMAL
APTT: 37.6 SEC (ref 26–36.2)
AST SERPL-CCNC: 15 U/L (ref 5–32)
BASE EXCESS ARTERIAL: 0 MMOL/L (ref -2–2)
BASOPHILS ABSOLUTE: 0 K/UL (ref 0–0.2)
BASOPHILS RELATIVE PERCENT: 0.2 % (ref 0–1)
BILIRUB SERPL-MCNC: 0.3 MG/DL (ref 0.2–1.2)
BORDETELLA PARAPERTUSSIS BY PCR: NOT DETECTED
BORDETELLA PERTUSSIS BY PCR: NOT DETECTED
BUN BLDV-MCNC: 9 MG/DL (ref 6–20)
CALCIUM SERPL-MCNC: 8.8 MG/DL (ref 8.6–10)
CARBOXYHEMOGLOBIN ARTERIAL: 4.9 % (ref 0–5)
CELL COUNT FLUID TYPE: NORMAL
CHLAMYDOPHILIA PNEUMONIAE BY PCR: NOT DETECTED
CHLORIDE BLD-SCNC: 100 MMOL/L (ref 98–111)
CLOT EVALUATION: NORMAL
CO2: 26 MMOL/L (ref 22–29)
COLOR FLUID: NORMAL
CORONAVIRUS 229E BY PCR: NOT DETECTED
CORONAVIRUS HKU1 BY PCR: NOT DETECTED
CORONAVIRUS NL63 BY PCR: NOT DETECTED
CORONAVIRUS OC43 BY PCR: NOT DETECTED
CREAT SERPL-MCNC: 0.7 MG/DL (ref 0.5–0.9)
D DIMER: 4.78 UG/ML FEU (ref 0–0.48)
EOSINOPHILS ABSOLUTE: 0.1 K/UL (ref 0–0.6)
EOSINOPHILS RELATIVE PERCENT: 0.4 % (ref 0–5)
FLUID TYPE: NORMAL
GFR AFRICAN AMERICAN: >59
GFR NON-AFRICAN AMERICAN: >60
GLUCOSE BLD-MCNC: 98 MG/DL (ref 74–109)
GLUCOSE, FLUID: 122
HCO3 ARTERIAL: 23.8 MMOL/L (ref 22–26)
HCT VFR BLD CALC: 36.1 % (ref 37–47)
HEMOGLOBIN, ART, EXTENDED: 11.7 G/DL (ref 12–16)
HEMOGLOBIN: 11.8 G/DL (ref 12–16)
HUMAN METAPNEUMOVIRUS BY PCR: NOT DETECTED
HUMAN RHINOVIRUS/ENTEROVIRUS BY PCR: NOT DETECTED
IMMATURE GRANULOCYTES #: 0 K/UL
INFLUENZA A BY PCR: NOT DETECTED
INFLUENZA B BY PCR: NOT DETECTED
INR BLD: 1.12 (ref 0.88–1.18)
LACTIC ACID: 1.4 MMOL/L (ref 0.5–1.9)
LD, FLUID: 332 U/L
LYMPHOCYTES ABSOLUTE: 1.4 K/UL (ref 1.1–4.5)
LYMPHOCYTES RELATIVE PERCENT: 11.3 % (ref 20–40)
LYMPHOCYTES, BODY FLUID: 63 %
MACROPHAGE FLUID: 2 %
MCH RBC QN AUTO: 31.2 PG (ref 27–31)
MCHC RBC AUTO-ENTMCNC: 32.7 G/DL (ref 33–37)
MCV RBC AUTO: 95.5 FL (ref 81–99)
MESOTHELIAL FLUID: 5 %
METHEMOGLOBIN ARTERIAL: 1 %
MONOCYTE, FLUID: 14 %
MONOCYTES ABSOLUTE: 0.8 K/UL (ref 0–0.9)
MONOCYTES RELATIVE PERCENT: 6.5 % (ref 0–10)
MYCOPLASMA PNEUMONIAE BY PCR: NOT DETECTED
NEUTROPHIL, FLUID: 16 %
NEUTROPHILS ABSOLUTE: 9.8 K/UL (ref 1.5–7.5)
NEUTROPHILS RELATIVE PERCENT: 81.3 % (ref 50–65)
NUCLEATED CELLS FLUID: 1952 /CUMM
NUMBER OF CELLS COUNTED FLUID: 100
O2 CONTENT ARTERIAL: 14.7 ML/DL
O2 SAT, ARTERIAL: 89.5 %
O2 THERAPY: ABNORMAL
PARAINFLUENZA VIRUS 1 BY PCR: NOT DETECTED
PARAINFLUENZA VIRUS 2 BY PCR: NOT DETECTED
PARAINFLUENZA VIRUS 3 BY PCR: NOT DETECTED
PARAINFLUENZA VIRUS 4 BY PCR: NOT DETECTED
PCO2 ARTERIAL: 35 MMHG (ref 35–45)
PDW BLD-RTO: 12.3 % (ref 11.5–14.5)
PH ARTERIAL: 7.44 (ref 7.35–7.45)
PH, BODY FLUID: 7.46
PLATELET # BLD: 468 K/UL (ref 130–400)
PMV BLD AUTO: 8.5 FL (ref 9.4–12.3)
PO2 ARTERIAL: 60 MMHG (ref 80–100)
POTASSIUM SERPL-SCNC: 4.1 MMOL/L (ref 3.5–5)
POTASSIUM, WHOLE BLOOD: 3.6
PRO-BNP: 165 PG/ML (ref 0–900)
PROTEIN FLUID: 4.4 G/DL
PROTHROMBIN TIME: 14.4 SEC (ref 12–14.6)
RBC # BLD: 3.78 M/UL (ref 4.2–5.4)
RBC FLUID: NORMAL /CUMM
RESPIRATORY SYNCYTIAL VIRUS BY PCR: NOT DETECTED
SARS-COV-2, PCR: NOT DETECTED
SODIUM BLD-SCNC: 139 MMOL/L (ref 136–145)
STREP PNEUMONIAE ANTIGEN, URINE: NORMAL
TOTAL PROTEIN: 6.9 G/DL (ref 6.6–8.7)
TROPONIN: <0.01 NG/ML (ref 0–0.03)
WBC # BLD: 12 K/UL (ref 4.8–10.8)

## 2021-05-23 PROCEDURE — 85610 PROTHROMBIN TIME: CPT

## 2021-05-23 PROCEDURE — 6360000002 HC RX W HCPCS: Performed by: EMERGENCY MEDICINE

## 2021-05-23 PROCEDURE — 87040 BLOOD CULTURE FOR BACTERIA: CPT

## 2021-05-23 PROCEDURE — 96374 THER/PROPH/DIAG INJ IV PUSH: CPT

## 2021-05-23 PROCEDURE — 96375 TX/PRO/DX INJ NEW DRUG ADDON: CPT

## 2021-05-23 PROCEDURE — 36415 COLL VENOUS BLD VENIPUNCTURE: CPT

## 2021-05-23 PROCEDURE — 87070 CULTURE OTHR SPECIMN AEROBIC: CPT

## 2021-05-23 PROCEDURE — 0202U NFCT DS 22 TRGT SARS-COV-2: CPT

## 2021-05-23 PROCEDURE — 88342 IMHCHEM/IMCYTCHM 1ST ANTB: CPT

## 2021-05-23 PROCEDURE — 2580000003 HC RX 258: Performed by: EMERGENCY MEDICINE

## 2021-05-23 PROCEDURE — 32551 INSERTION OF CHEST TUBE: CPT

## 2021-05-23 PROCEDURE — 6360000004 HC RX CONTRAST MEDICATION: Performed by: EMERGENCY MEDICINE

## 2021-05-23 PROCEDURE — 80053 COMPREHEN METABOLIC PANEL: CPT

## 2021-05-23 PROCEDURE — 87449 NOS EACH ORGANISM AG IA: CPT

## 2021-05-23 PROCEDURE — 6370000000 HC RX 637 (ALT 250 FOR IP): Performed by: INTERNAL MEDICINE

## 2021-05-23 PROCEDURE — 84157 ASSAY OF PROTEIN OTHER: CPT

## 2021-05-23 PROCEDURE — 85379 FIBRIN DEGRADATION QUANT: CPT

## 2021-05-23 PROCEDURE — 88341 IMHCHEM/IMCYTCHM EA ADD ANTB: CPT

## 2021-05-23 PROCEDURE — 85730 THROMBOPLASTIN TIME PARTIAL: CPT

## 2021-05-23 PROCEDURE — 82945 GLUCOSE OTHER FLUID: CPT

## 2021-05-23 PROCEDURE — 85025 COMPLETE CBC W/AUTO DIFF WBC: CPT

## 2021-05-23 PROCEDURE — 87075 CULTR BACTERIA EXCEPT BLOOD: CPT

## 2021-05-23 PROCEDURE — 83986 ASSAY PH BODY FLUID NOS: CPT

## 2021-05-23 PROCEDURE — 87102 FUNGUS ISOLATION CULTURE: CPT

## 2021-05-23 PROCEDURE — 88305 TISSUE EXAM BY PATHOLOGIST: CPT

## 2021-05-23 PROCEDURE — 84132 ASSAY OF SERUM POTASSIUM: CPT

## 2021-05-23 PROCEDURE — 89051 BODY FLUID CELL COUNT: CPT

## 2021-05-23 PROCEDURE — 71045 X-RAY EXAM CHEST 1 VIEW: CPT

## 2021-05-23 PROCEDURE — 2000000000 HC ICU R&B

## 2021-05-23 PROCEDURE — 88360 TUMOR IMMUNOHISTOCHEM/MANUAL: CPT

## 2021-05-23 PROCEDURE — 83605 ASSAY OF LACTIC ACID: CPT

## 2021-05-23 PROCEDURE — 84484 ASSAY OF TROPONIN QUANT: CPT

## 2021-05-23 PROCEDURE — 2700000000 HC OXYGEN THERAPY PER DAY

## 2021-05-23 PROCEDURE — 94640 AIRWAY INHALATION TREATMENT: CPT

## 2021-05-23 PROCEDURE — 96365 THER/PROPH/DIAG IV INF INIT: CPT

## 2021-05-23 PROCEDURE — 99223 1ST HOSP IP/OBS HIGH 75: CPT | Performed by: INTERNAL MEDICINE

## 2021-05-23 PROCEDURE — 87015 SPECIMEN INFECT AGNT CONCNTJ: CPT

## 2021-05-23 PROCEDURE — 83880 ASSAY OF NATRIURETIC PEPTIDE: CPT

## 2021-05-23 PROCEDURE — 6370000000 HC RX 637 (ALT 250 FOR IP): Performed by: EMERGENCY MEDICINE

## 2021-05-23 PROCEDURE — 6360000002 HC RX W HCPCS: Performed by: INTERNAL MEDICINE

## 2021-05-23 PROCEDURE — 88112 CYTOPATH CELL ENHANCE TECH: CPT

## 2021-05-23 PROCEDURE — 71275 CT ANGIOGRAPHY CHEST: CPT

## 2021-05-23 PROCEDURE — 82803 BLOOD GASES ANY COMBINATION: CPT

## 2021-05-23 PROCEDURE — 93005 ELECTROCARDIOGRAM TRACING: CPT

## 2021-05-23 PROCEDURE — 83615 LACTATE (LD) (LDH) ENZYME: CPT

## 2021-05-23 PROCEDURE — 2580000003 HC RX 258: Performed by: INTERNAL MEDICINE

## 2021-05-23 PROCEDURE — 87205 SMEAR GRAM STAIN: CPT

## 2021-05-23 PROCEDURE — 36600 WITHDRAWAL OF ARTERIAL BLOOD: CPT

## 2021-05-23 PROCEDURE — 99284 EMERGENCY DEPT VISIT MOD MDM: CPT

## 2021-05-23 RX ORDER — GABAPENTIN 300 MG/1
600 CAPSULE ORAL 3 TIMES DAILY
Status: DISCONTINUED | OUTPATIENT
Start: 2021-05-23 | End: 2021-05-26

## 2021-05-23 RX ORDER — ONDANSETRON 2 MG/ML
4 INJECTION INTRAMUSCULAR; INTRAVENOUS EVERY 6 HOURS PRN
Status: DISCONTINUED | OUTPATIENT
Start: 2021-05-23 | End: 2021-05-28 | Stop reason: HOSPADM

## 2021-05-23 RX ORDER — SODIUM CHLORIDE 0.9 % (FLUSH) 0.9 %
5-40 SYRINGE (ML) INJECTION EVERY 12 HOURS SCHEDULED
Status: DISCONTINUED | OUTPATIENT
Start: 2021-05-23 | End: 2021-05-28 | Stop reason: HOSPADM

## 2021-05-23 RX ORDER — MORPHINE SULFATE 4 MG/ML
2 INJECTION, SOLUTION INTRAMUSCULAR; INTRAVENOUS EVERY 4 HOURS PRN
Status: DISCONTINUED | OUTPATIENT
Start: 2021-05-23 | End: 2021-05-25

## 2021-05-23 RX ORDER — CLONAZEPAM 0.5 MG/1
0.5 TABLET ORAL 2 TIMES DAILY PRN
Status: DISCONTINUED | OUTPATIENT
Start: 2021-05-23 | End: 2021-05-28 | Stop reason: HOSPADM

## 2021-05-23 RX ORDER — ATORVASTATIN CALCIUM 40 MG/1
40 TABLET, FILM COATED ORAL DAILY
Status: DISCONTINUED | OUTPATIENT
Start: 2021-05-23 | End: 2021-05-28 | Stop reason: HOSPADM

## 2021-05-23 RX ORDER — TOPIRAMATE 50 MG/1
50 TABLET, FILM COATED ORAL 2 TIMES DAILY
Status: DISCONTINUED | OUTPATIENT
Start: 2021-05-23 | End: 2021-05-28 | Stop reason: HOSPADM

## 2021-05-23 RX ORDER — PROPRANOLOL HYDROCHLORIDE 10 MG/1
10 TABLET ORAL 2 TIMES DAILY
Status: DISCONTINUED | OUTPATIENT
Start: 2021-05-23 | End: 2021-05-28 | Stop reason: HOSPADM

## 2021-05-23 RX ORDER — SODIUM CHLORIDE 9 MG/ML
25 INJECTION, SOLUTION INTRAVENOUS PRN
Status: DISCONTINUED | OUTPATIENT
Start: 2021-05-23 | End: 2021-05-28 | Stop reason: HOSPADM

## 2021-05-23 RX ORDER — ACETAMINOPHEN 325 MG/1
650 TABLET ORAL EVERY 6 HOURS PRN
Status: DISCONTINUED | OUTPATIENT
Start: 2021-05-23 | End: 2021-05-28 | Stop reason: HOSPADM

## 2021-05-23 RX ORDER — MORPHINE SULFATE 15 MG/1
15 TABLET, FILM COATED, EXTENDED RELEASE ORAL EVERY 8 HOURS
Status: DISCONTINUED | OUTPATIENT
Start: 2021-05-23 | End: 2021-05-25

## 2021-05-23 RX ORDER — ROPINIROLE 0.5 MG/1
0.25 TABLET, FILM COATED ORAL NIGHTLY
Status: DISCONTINUED | OUTPATIENT
Start: 2021-05-23 | End: 2021-05-28 | Stop reason: HOSPADM

## 2021-05-23 RX ORDER — BUSPIRONE HYDROCHLORIDE 10 MG/1
10 TABLET ORAL 3 TIMES DAILY
Status: DISCONTINUED | OUTPATIENT
Start: 2021-05-23 | End: 2021-05-28 | Stop reason: HOSPADM

## 2021-05-23 RX ORDER — ONDANSETRON 4 MG/1
4 TABLET, ORALLY DISINTEGRATING ORAL EVERY 8 HOURS PRN
Status: DISCONTINUED | OUTPATIENT
Start: 2021-05-23 | End: 2021-05-28 | Stop reason: HOSPADM

## 2021-05-23 RX ORDER — AMITRIPTYLINE HYDROCHLORIDE 50 MG/1
100 TABLET, FILM COATED ORAL NIGHTLY
Status: DISCONTINUED | OUTPATIENT
Start: 2021-05-23 | End: 2021-05-26

## 2021-05-23 RX ORDER — POLYETHYLENE GLYCOL 3350 17 G/17G
17 POWDER, FOR SOLUTION ORAL DAILY PRN
Status: DISCONTINUED | OUTPATIENT
Start: 2021-05-23 | End: 2021-05-28 | Stop reason: HOSPADM

## 2021-05-23 RX ORDER — METHYLPREDNISOLONE SODIUM SUCCINATE 125 MG/2ML
125 INJECTION, POWDER, LYOPHILIZED, FOR SOLUTION INTRAMUSCULAR; INTRAVENOUS ONCE
Status: COMPLETED | OUTPATIENT
Start: 2021-05-23 | End: 2021-05-23

## 2021-05-23 RX ORDER — ACETAMINOPHEN 650 MG/1
650 SUPPOSITORY RECTAL EVERY 6 HOURS PRN
Status: DISCONTINUED | OUTPATIENT
Start: 2021-05-23 | End: 2021-05-28 | Stop reason: HOSPADM

## 2021-05-23 RX ORDER — MONTELUKAST SODIUM 10 MG/1
10 TABLET ORAL NIGHTLY
Status: DISCONTINUED | OUTPATIENT
Start: 2021-05-23 | End: 2021-05-28 | Stop reason: HOSPADM

## 2021-05-23 RX ORDER — PROMETHAZINE HYDROCHLORIDE 12.5 MG/1
12.5 TABLET ORAL EVERY 6 HOURS PRN
Status: DISCONTINUED | OUTPATIENT
Start: 2021-05-23 | End: 2021-05-28 | Stop reason: HOSPADM

## 2021-05-23 RX ORDER — GABAPENTIN 300 MG/1
600 CAPSULE ORAL 3 TIMES DAILY
Status: DISCONTINUED | OUTPATIENT
Start: 2021-05-23 | End: 2021-05-23 | Stop reason: SDUPTHER

## 2021-05-23 RX ORDER — IPRATROPIUM BROMIDE AND ALBUTEROL SULFATE 2.5; .5 MG/3ML; MG/3ML
1 SOLUTION RESPIRATORY (INHALATION) ONCE
Status: COMPLETED | OUTPATIENT
Start: 2021-05-23 | End: 2021-05-23

## 2021-05-23 RX ORDER — CITALOPRAM 20 MG/1
40 TABLET ORAL DAILY
Status: DISCONTINUED | OUTPATIENT
Start: 2021-05-23 | End: 2021-05-28 | Stop reason: HOSPADM

## 2021-05-23 RX ORDER — SODIUM CHLORIDE 0.9 % (FLUSH) 0.9 %
10 SYRINGE (ML) INJECTION PRN
Status: DISCONTINUED | OUTPATIENT
Start: 2021-05-23 | End: 2021-05-28 | Stop reason: HOSPADM

## 2021-05-23 RX ORDER — PANTOPRAZOLE SODIUM 40 MG/1
40 TABLET, DELAYED RELEASE ORAL
Status: DISCONTINUED | OUTPATIENT
Start: 2021-05-24 | End: 2021-05-28 | Stop reason: HOSPADM

## 2021-05-23 RX ADMIN — ACETAMINOPHEN 650 MG: 325 TABLET ORAL at 18:18

## 2021-05-23 RX ADMIN — MONTELUKAST 10 MG: 10 TABLET, FILM COATED ORAL at 20:48

## 2021-05-23 RX ADMIN — MORPHINE SULFATE 2 MG: 4 INJECTION, SOLUTION INTRAMUSCULAR; INTRAVENOUS at 10:52

## 2021-05-23 RX ADMIN — PROPRANOLOL HYDROCHLORIDE 10 MG: 10 TABLET ORAL at 20:47

## 2021-05-23 RX ADMIN — Medication 10 ML: at 21:10

## 2021-05-23 RX ADMIN — TOPIRAMATE 50 MG: 50 TABLET, FILM COATED ORAL at 20:47

## 2021-05-23 RX ADMIN — ENOXAPARIN SODIUM 40 MG: 40 INJECTION SUBCUTANEOUS at 15:03

## 2021-05-23 RX ADMIN — VANCOMYCIN HYDROCHLORIDE 1000 MG: 10 INJECTION, POWDER, LYOPHILIZED, FOR SOLUTION INTRAVENOUS at 20:00

## 2021-05-23 RX ADMIN — MORPHINE SULFATE 2 MG: 4 INJECTION, SOLUTION INTRAMUSCULAR; INTRAVENOUS at 14:30

## 2021-05-23 RX ADMIN — IOPAMIDOL 90 ML: 755 INJECTION, SOLUTION INTRAVENOUS at 08:52

## 2021-05-23 RX ADMIN — BUSPIRONE HYDROCHLORIDE 10 MG: 10 TABLET ORAL at 15:03

## 2021-05-23 RX ADMIN — Medication 10 ML: at 15:06

## 2021-05-23 RX ADMIN — MORPHINE SULFATE 15 MG: 15 TABLET, FILM COATED, EXTENDED RELEASE ORAL at 22:47

## 2021-05-23 RX ADMIN — ROPINIROLE HYDROCHLORIDE 0.25 MG: 0.5 TABLET, FILM COATED ORAL at 20:48

## 2021-05-23 RX ADMIN — METHYLPREDNISOLONE SODIUM SUCCINATE 125 MG: 125 INJECTION, POWDER, FOR SOLUTION INTRAMUSCULAR; INTRAVENOUS at 07:17

## 2021-05-23 RX ADMIN — MORPHINE SULFATE 15 MG: 15 TABLET, FILM COATED, EXTENDED RELEASE ORAL at 15:03

## 2021-05-23 RX ADMIN — AMITRIPTYLINE HYDROCHLORIDE 100 MG: 50 TABLET, FILM COATED ORAL at 20:48

## 2021-05-23 RX ADMIN — GABAPENTIN 600 MG: 300 CAPSULE ORAL at 20:48

## 2021-05-23 RX ADMIN — BUSPIRONE HYDROCHLORIDE 10 MG: 10 TABLET ORAL at 20:47

## 2021-05-23 RX ADMIN — ATORVASTATIN CALCIUM 40 MG: 40 TABLET, FILM COATED ORAL at 15:03

## 2021-05-23 RX ADMIN — CITALOPRAM HYDROBROMIDE 40 MG: 20 TABLET ORAL at 15:03

## 2021-05-23 RX ADMIN — IPRATROPIUM BROMIDE AND ALBUTEROL SULFATE 1 AMPULE: .5; 3 SOLUTION RESPIRATORY (INHALATION) at 07:34

## 2021-05-23 RX ADMIN — TOPIRAMATE 50 MG: 50 TABLET, FILM COATED ORAL at 15:03

## 2021-05-23 RX ADMIN — CEFEPIME HYDROCHLORIDE 2000 MG: 2 INJECTION, POWDER, FOR SOLUTION INTRAVENOUS at 08:22

## 2021-05-23 RX ADMIN — GABAPENTIN 600 MG: 300 CAPSULE ORAL at 15:04

## 2021-05-23 RX ADMIN — CEFEPIME HYDROCHLORIDE 2000 MG: 2 INJECTION, POWDER, FOR SOLUTION INTRAVENOUS at 16:42

## 2021-05-23 RX ADMIN — VANCOMYCIN HYDROCHLORIDE 1750 MG: 10 INJECTION, POWDER, LYOPHILIZED, FOR SOLUTION INTRAVENOUS at 08:22

## 2021-05-23 ASSESSMENT — PAIN SCALES - GENERAL
PAINLEVEL_OUTOF10: 0
PAINLEVEL_OUTOF10: 9
PAINLEVEL_OUTOF10: 10
PAINLEVEL_OUTOF10: 6
PAINLEVEL_OUTOF10: 10
PAINLEVEL_OUTOF10: 10
PAINLEVEL_OUTOF10: 5
PAINLEVEL_OUTOF10: 10
PAINLEVEL_OUTOF10: 4
PAINLEVEL_OUTOF10: 4
PAINLEVEL_OUTOF10: 0
PAINLEVEL_OUTOF10: 10

## 2021-05-23 ASSESSMENT — ENCOUNTER SYMPTOMS
BACK PAIN: 0
COLOR CHANGE: 0
CONSTIPATION: 0
CHEST TIGHTNESS: 1
VOMITING: 0
COUGH: 1
VOICE CHANGE: 0
SORE THROAT: 0
RHINORRHEA: 0
DIARRHEA: 0
NAUSEA: 0
SHORTNESS OF BREATH: 1
ABDOMINAL PAIN: 0

## 2021-05-23 ASSESSMENT — PAIN - FUNCTIONAL ASSESSMENT
PAIN_FUNCTIONAL_ASSESSMENT: PREVENTS OR INTERFERES WITH MANY ACTIVE NOT PASSIVE ACTIVITIES

## 2021-05-23 ASSESSMENT — PAIN DESCRIPTION - ONSET
ONSET: ON-GOING

## 2021-05-23 ASSESSMENT — PAIN DESCRIPTION - PAIN TYPE
TYPE: ACUTE PAIN

## 2021-05-23 ASSESSMENT — PAIN DESCRIPTION - LOCATION
LOCATION: CHEST

## 2021-05-23 ASSESSMENT — PAIN DESCRIPTION - ORIENTATION
ORIENTATION: LEFT

## 2021-05-23 ASSESSMENT — PAIN DESCRIPTION - FREQUENCY
FREQUENCY: CONTINUOUS

## 2021-05-23 ASSESSMENT — PAIN DESCRIPTION - DESCRIPTORS
DESCRIPTORS: ACHING;BURNING;DISCOMFORT
DESCRIPTORS: ACHING;SORE
DESCRIPTORS: ACHING;BURNING;DISCOMFORT
DESCRIPTORS: BURNING;CRAMPING;DISCOMFORT

## 2021-05-23 ASSESSMENT — PAIN DESCRIPTION - PROGRESSION
CLINICAL_PROGRESSION: NOT CHANGED

## 2021-05-23 NOTE — PROGRESS NOTES
Results for Zhanna Aguilar (MRN [id number]) as of 5/23/2021 07:27   Ref.  Range 5/23/2021 07:24   Hemoglobin, Art, Extended Latest Ref Range: 12.0 - 16.0 g/dL 11.7 (L)   pH, Arterial Latest Ref Range: 7.350 - 7.450  7.440   pCO2, Arterial Latest Ref Range: 35.0 - 45.0 mmHg 35.0   pO2, Arterial Latest Ref Range: 80.0 - 100.0 mmHg 60.0 (L)   HCO3, Arterial Latest Ref Range: 22.0 - 26.0 mmol/L 23.8   Base Excess, Arterial Latest Ref Range: -2.0 - 2.0 mmol/L 0.0   O2 Sat, Arterial Latest Ref Range: >92 % 89.5 (L)   O2 Content, Arterial Latest Ref Range: Not Established mL/dL 14.7   Methemoglobin, Arterial Latest Ref Range: <1.5 % 1.0   Carboxyhgb, Arterial Latest Ref Range: 0.0 - 5.0 % 4.9   Pt on 2 lpm NC, RB

## 2021-05-23 NOTE — H&P
Hospital Medicine H&P    Patient:  Otilia Lamar  MRN: [id number]    Consulting Physician: Gaby Pulliam DO  Reason for Consult: Medical Management  Primacy Care Physician: Claude Blazing, APRN    HISTORY OF PRESENT ILLNESS:   The patient is a 48 y.o. female presents with worsening shortness of breath. She has not felt well for the last 4 weeks. She was recently started on antibiotics as an outpatient for left-sided pneumonia. Her breathing got significantly worse this morning prompting her to come into the emergency department. She had a CT angiogram of the chest as part of her work-up. This was negative for PE, unfortunately, it appears as though that she may have metastatic lung carcinoma with a very large left-sided pleural effusion. I did notify her of these findings. We will get pulmonology and oncology on board. I feel that she will most likely also need a left-sided chest tube for drainage. She is obviously distraught at the news.     Past Medical History:        Diagnosis Date    Anxiety     Arthritis     Chronic tension-type headache, intractable 1/8/2016    Depression     Headache(784.0)     migraine    Lupus ([address]     Neck pain 1/8/2016       Past Surgical History:        Procedure Laterality Date    COLONOSCOPY  10/23/2013    Dr Giselle Fabian: internal hemorrhoids; hyperplastic polyps (5 yr recal for screening)    HYSTERECTOMY      NECK SURGERY  07/26/2016    Dr Anayeli Panchal FLX DX W/COLLJ SPEC WHEN PFRMD N/A 2/6/2017    COLONOSCOPY DIAGNOSTIC OR SCREENING performed by Eva Sal DO at [address] [facility] OR    FL EGD TRANSORAL BIOPSY SINGLE/MULTIPLE N/A 7/26/2018    Dr Mandel-w/dilation over wire-51 [place]-mild esophageal narrowing-Yas (-)    TUBAL LIGATION      TUMOR REMOVAL      right deltoid area    UPPER GASTROINTESTINAL ENDOSCOPY  11/06/2013    Dr Giselle Fabian: normal    UPPER GASTROINTESTINAL ENDOSCOPY  7/26/2018    Dr Mandel-gal/dilation over wire-51 [place]-mild esophageal narrowing-Yas (-)       Medications: Scheduled Meds:   vancomycin (VANCOCIN) intermittent dosing (placeholder)   Other RX Placeholder    gabapentin  600 mg Oral TID    rOPINIRole  0.25 mg Oral Nightly    [START ON 5/24/2021] pantoprazole  40 mg Oral QAM AC    amitriptyline  100 mg Oral Nightly    propranolol  10 mg Oral BID    topiramate  50 mg Oral BID    citalopram  40 mg Oral Daily    busPIRone  10 mg Oral TID    atorvastatin  40 mg Oral Daily    sodium chloride flush  5-40 mL Intravenous 2 times per day    enoxaparin  40 mg Subcutaneous Q24H    cefepime  2,000 mg Intravenous Q8H    montelukast  10 mg Oral Nightly    vancomycin  1,000 mg Intravenous Q12H     Continuous Infusions:   sodium chloride       PRN Meds:.clonazePAM, ondansetron, sodium chloride flush, sodium chloride, promethazine **OR** ondansetron, polyethylene glycol, acetaminophen **OR** acetaminophen, morphine    Allergies:  Ventolin [albuterol]    Social History:   TOBACCO:   reports that she has been smoking cigarettes and e-cigarettes. She has a 16.00 pack-year smoking history. She has never used smokeless tobacco.  ETOH:   reports no history of alcohol use. Family History:       Problem Relation Age of Onset    Colon Cancer Maternal Aunt     Colon Polyps Maternal Aunt     Liver Cancer Neg Hx     Liver Disease Neg Hx     Rectal Cancer Neg Hx     Stomach Cancer Neg Hx        ROS:  Review of Systems   Constitutional: Positive for activity change and fatigue. HENT: Negative for rhinorrhea and voice change. Eyes: Negative for visual disturbance. Respiratory: Positive for cough, chest tightness and shortness of breath. Cardiovascular: Negative for chest pain and leg swelling. Gastrointestinal: Negative for constipation, diarrhea, nausea and vomiting. Endocrine: Negative for polyuria. Genitourinary: Negative for difficulty urinating and dysuria. Musculoskeletal: Negative for arthralgias and back pain.    Skin: input(s): CHOL, HDL in the last 72 hours. Invalid input(s): LDLCALCU  ABGs:   Lab Results   Component Value Date    PHART 7.440 05/23/2021    PO2ART 60.0 05/23/2021    JAE8ASE 35.0 05/23/2021     INR:   Recent Labs     05/23/21  0720   INR 1.12     -----------------------------------------------------------------  XR CHEST (2 VW)    Result Date: 5/17/2021  EXAMINATION: XR CHEST (2 VW) 5/17/2021 1:21 PM HISTORY: Chest congestion COMPARISON: 10/30/2018, 10/13/2018 FINDINGS: There are airspace opacities throughout the left lung with more focal consolidation left lung base. A small layering left pleural effusion is also evident. The right lung remains clear. The heart and mediastinal contours are normal. Pulmonary vasculature appears grossly normal. There has been prior fusion of the lower cervical spine. Multifocal pneumonia on the left with probable reactive pleural effusion. Follow-up PA and lateral chest radiograph is recommended in 6-8 weeks to document resolution. Signed by Dr Juan Miguel Kwon on 5/17/2021 1:23 PM    XR CHEST PORTABLE    Result Date: 5/23/2021  EXAM: XR CHEST PORTABLE -- 5/23/2021 6:10 AM HISTORY: 55 years, Female, shortness of air COMPARISON: 5/17/2021 TECHNIQUE:  1 images. Frontal view of the chest. FINDINGS:  No pneumothorax. Right lung clear. Increased left basilar opacity and effusion with patchy upper lung opacities. Cardiac silhouette obscured by pulmonary opacity. Upper mediastinum within normal limits. Lower cervical spine fixation hardware. No acute bony finding. 1. Increased left basilar opacity and effusion compared to 5/17/2021. Similar patchy left upper lung opacities. Signed by Dr Leslie Egan on 5/23/2021 7:20 AM    CTA PULMONARY W CONTRAST    Result Date: 5/23/2021  EXAM: CTA PULMONARY W CONTRAST -- 5/23/2021 7:18 AM HISTORY: 55 years, Female, large effusion, low oxygen saturation COMPARISON: 10/30/2018, chest radiograph 5/23/2021 DLP: 415 mGy cm.  Automated exposure control was utilized to minimize patient radiation dose. TECHNIQUE: Enhanced axial CT images obtained. 3-D postprocessing, including MIPS, performed and images saved to PACS. FINDINGS: AIRWAYS/PULMONARY PARENCHYMA: The central airways are midline and patent. Emphysematous changes. Right lung clear. No right pleural effusion. Left lung with compressive atelectasis at the lung base. Patchy consolidative and groundglass opacities with left hilar adenopathy and pleural nodularity. Large left pleural effusion. VASCULATURE: Contrast bolus is adequate. There are no pulmonary arterial intraluminal filling defects. Normal pulmonary artery caliber. Thoracic aorta is normal in course and caliber. CARDIAC:  Cardiac size is normal.  No pericardial effusion. MEDIASTINUM: Left hilar, AP window and left paratracheal lymphadenopathy. For instance, left paratracheal lymph node measures up to 1.3 cm in short axis on axial series 2, image 46. Esophagus has normal coarse, caliber and wall thickness. EXTRATHORACIC: The visualized portions of the thyroid gland are unremarkable. No thoracic inlet or axillary adenopathy. The extrathoracic soft tissues are within normal limits. INCLUDED UPPER ABDOMEN: Focal fatty infiltration of the liver adjacent to the falciform ligament. Arterial phase of imaging limits evaluation. The gallbladder, pancreas, spleen, adrenal glands appear within normal limits. Left retroperitoneal lymph node measures up to 1.2 cm in short axis on axial series 2, image 124. Nonobstructing right renal calculi. No hydronephrosis. OSSEOUS: No acute or suspicious bony finding. 1. Large left pleural effusion with pleural nodularity. Patchy left lung consolidative and groundglass opacities with left lower lobe atelectasis. Left hilar and mediastinal adenopathy. Overall, appearance is most concerning for malignancy. Malignancy with superimposed infection would also be a consideration. 2. Left retroperitoneal lymphadenopathy. 3. Nonobstructing right renal calculi. Signed by Dr Salud Walters on 5/23/2021 9:03 AM        Assessment and Plan     Acute respiratory failure with hypoxia. Continue O2 support. Left lower lobe pneumonia. Pneumonia pathway. Large left pleural effusion most likely malignant. Chest tube drainage. Consultation with pulmonology and oncology. Please document 40 minutes of critical care time for patient assessment, chart review, discussion with staff, .       Kurt Kincaid DO

## 2021-05-23 NOTE — CONSULTS
Pulmonary and Critical Care Consult Note    [facility] Nina Hernandes    MRN# [id number]    Acct# [de-identified]  5/23/2021   1:52 PM CDT    Referring eBtsey Navarro DO      Chief Complaint: Shortness of breath, left-sided chest pain    Requesting physician: Dr. Yandel Malloy    Reason for consult: Large pleural effusion      HPI: We have been consulted to see this 48y.o. year old female born on 1965. The patient has been having left-sided chest wall pain and shortness of breath for about 5 weeks. She had multiple chest x-rays at outside facilities that that showed left-sided infiltrates and effusions. She was treated with antibiotics as outpatient due to the suspicion of pneumonia. The patient denies any fevers or chills or sweats. She does have a cough. Her cough is productive of yellow sputum. She is a smoker. She says she quit recently. She denies weight loss. As [id number] she reports weight gain. She denies hemoptysis. She had a CT pulmonary angiogram when she presented to this facility for her nonresolving symptoms. The CT showed no evidence of PE however she did have large left-sided pleural effusion and parenchymal and interstitial infiltrates. I was asked to see her regarding the above. At this point in time she feels that her pain is persistent and unchanged.       Past Medical History      Past Medical History:   Diagnosis Date    Anxiety     Arthritis     Chronic tension-type headache, intractable 1/8/2016    Depression     Headache(784.0)     migraine    Lupus ([address]     Neck pain 1/8/2016     SurgicalHistory  Past Surgical History:   Procedure Laterality Date    COLONOSCOPY  10/23/2013    Dr Friend Erp: internal hemorrhoids; hyperplastic polyps (5 yr recal for screening)    HYSTERECTOMY      NECK SURGERY  07/26/2016    Dr Ingrid Oden FLX DX W/TARUN [address] 1977 PFRMD N/A 2/6/2017    COLONOSCOPY DIAGNOSTIC OR SCREENING performed by Mindi Diana Middle-aged white female who appears to be in slight distress. HEENT: Normocephalic atraumatic. No lymphadenopathy now JVD. Heart: S1-S2 no murmurs. Regular rate and rhythm. Lungs: Diminished breath sounds bilaterally. No rubs or chest wall tenderness she does have dullness to percussion on the left  Abdomen: Soft nontender no organomegalies normal bowel sounds  Extremities: No clubbing or cyanosis or edema  Neuro: Alert oriented x3. No focal findings  Skin: No skin lesions        Labs:  Recent Labs     05/23/21 0720   WBC 12.0*   RBC 3.78*   HGB 11.8*   HCT 36.1*   *   MCV 95.5   MCH 31.2*   MCHC 32.7*   RDW 12.3      Recent Labs     05/23/21 0720 05/23/21 0724     --    K 4.1 3.6     --    CO2 26  --    BUN 9  --    CREATININE 0.7  --    CALCIUM 8.8  --    GLUCOSE 98  --       Recent Labs     05/23/21 0724   PHART 7.440   PFV0VSK 35.0   PO2ART 60.0*   SZN7JGL 23.8   F6FNPWCV 89.5*   BEART 0.0     Recent Labs     05/23/21 0720 05/23/21 0738   AST 15  --    ALT 6  --    ALKPHOS 107*  --    BILITOT 0.3  --    CALCIUM 8.8  --    PROBNP 165  --    TROPONINI <0.01  --    LACTA  --  1.4   INR 1.12  --    DDIMER 4.78*  --      No results for input(s): BC, LABGRAM, CULTRESP, BFCX in the last 72 hours. Radiograph: XR CHEST (2 VW)    Result Date: 5/17/2021  Multifocal pneumonia on the left with probable reactive pleural effusion. Follow-up PA and lateral chest radiograph is recommended in 6-8 weeks to document resolution. Signed by Dr Ernesto Ochoa on 5/17/2021 1:23 PM    XR CHEST PORTABLE    Result Date: 5/23/2021  1. Increased left basilar opacity and effusion compared to 5/17/2021. Similar patchy left upper lung opacities. Signed by Dr Mannie Bhandari on 5/23/2021 7:20 AM    CTA PULMONARY W CONTRAST    Result Date: 5/23/2021  1. Large left pleural effusion with pleural nodularity. Patchy left lung consolidative and groundglass opacities with left lower lobe atelectasis.  Left hilar and mediastinal adenopathy. Overall, appearance is most concerning for malignancy. Malignancy with superimposed infection would also be a consideration. 2. Left retroperitoneal lymphadenopathy. 3. Nonobstructing right renal calculi. Signed by Dr Dayna Mae on 5/23/2021 9:03 AM       My radiograph interpretation/independent review of imaging: Reviewed by myself as discussed above    Problem list generated by [facility]:  Hospital Problems         Last Modified POA    Acute respiratory failure with hypoxia ([address] 5/23/2021 Yes             Pulmonary Assessment/plan:    1. Acute hypoxic respiratory failure requiring oxygen. This is likely due to underlying lung pathology. Continue oxygen supplementation as necessary to maintain adequate oxygenation. 2. Large left-sided pleural effusion that seems to be complex. Likely parapneumonic versus para malignant. Pleural effusion fluid was removed and sent for labs per orders. Chest tube has been placed. Repeat CT of the chest in the morning to better evaluate underlying anatomy after the effusion has been drained. 3. Possible community-acquired pneumonia but seems to be less likely. Agree with empirical antibiotic therapy for now. 4. Dyspnea due to the above supportive care. 5. Chest wall pain likely secondary to the above. Supportive care. Consider bone scan to rule out rib mets when able. 6. DVT prophylaxis.         Electronically signed by Kelsea Rivera MD on 05/23/21 at 1:52 PM

## 2021-05-23 NOTE — ED NOTES
Bed: 01-A  Expected date:   Expected time:   Means of arrival:   Comments:  [address] [place] [address]  05/23/21 [id number]

## 2021-05-23 NOTE — PROCEDURES
After consent and under ultrasound guidance and sterile precautions and local anesthetic the patient underwent chest tube placement using 14 Portuguese arrow catheter which was placed in midaxillary line on the left and a 6-7th rib intercostal space. About 1.5 cc of jhonny looking fluid was drained. The patient tolerated well. No immediate postoperative complications. Fluid was sent for cytology. Chest x-ray ordered. Diarrhea since yesterday, now temp-101.5,vomitting few times daily, loose cough, advised to run vaporizer, fluids, small amts frequently, fever reducer prn, dress light, bath for temp,starchy diet, Pedialyte, moniter for s&s of dehydration, symptoms review

## 2021-05-23 NOTE — PROGRESS NOTES
Pharmacy Adjustment    Ashley Pena is a 48 y.o. female. Pharmacy has adjusted medications per protocol. Recent Labs     05/23/21  0720   BUN 9       Recent Labs     05/23/21  0720   CREATININE 0.7       Estimated Creatinine Clearance: 92 mL/min (based on SCr of 0.7 mg/dL). Height:   Ht Readings from Last 1 Encounters:   05/23/21 5' 3\" (1.6 m)     Weight:  Wt Readings from Last 1 Encounters:   05/23/21 178 lb (80.7 kg)       Plan: Adjust Cefepime to 2gm IV q 8 hours for pneumonia.     DAMI HERNANDEZ, PHARM D, 5/23/2021, 10:51 AM

## 2021-05-23 NOTE — ED PROVIDER NOTES
[facility]  eMERGENCY dEPARTMENT eNCOUnter      Pt Name: Molly Page  MRN: [id number]  [place] 1965  Date of evaluation: 5/23/2021  Provider: MD Louise Nazario       Chief Complaint   Patient presents with    Shortness of Breath     Started 4- 5 weeks ago, getting worse today, pt sitting in tripod position         HISTORY OF PRESENT ILLNESS   (Location/Symptom, Timing/Onset,Context/Setting, Quality, Duration, Modifying Factors, Severity)  Note limiting factors. Molly Page is a 48 y.o. female who presents to the emergency department with shortness of breath. The patient has had pneumonia for 5 weeks per her report. She has been on antibiotics. Looks like cipro and augmentin per chart. She does not wear home O2. She does not have a history of COPD. EMS reports her sat was 85% on arrival.  They gave her a nebulizer and her oxygen saturation improved to 97% but is gradually going back down. Patient reports extreme shortness of breath at this time. HPI    NursingNotes were reviewed. REVIEW OF SYSTEMS    (2-9 systems for level 4, 10 or more for level 5)     Review of Systems   Constitutional: Negative for chills and fever. HENT: Negative for rhinorrhea and sore throat. Respiratory: Positive for cough and shortness of breath. Cardiovascular: Negative for chest pain and leg swelling. Gastrointestinal: Negative for abdominal pain, diarrhea, nausea and vomiting. Genitourinary: Negative for difficulty urinating. Musculoskeletal: Negative for back pain and neck pain. Skin: Negative for rash. Neurological: Positive for weakness. Negative for headaches. Psychiatric/Behavioral: Negative for confusion. A complete review of systems was performed and is negative except as noted above in the HPI.        PAST MEDICAL HISTORY     Past Medical History:   Diagnosis Date    Anxiety     Arthritis     Chronic tension-type headache, intractable 1/8/2016    Depression     Headache(784.0)     migraine    Lupus ([facility] [address]     Neck pain 1/8/2016         SURGICAL HISTORY       Past Surgical History:   Procedure Laterality Date    COLONOSCOPY  10/23/2013    Dr Negro Reyes: internal hemorrhoids; hyperplastic polyps (5 yr recal for screening)    HYSTERECTOMY      NECK SURGERY  07/26/2016    Dr Fly Crabtree FLX DX W/COLLJ SPEC WHEN PFRMD N/A 2/6/2017    COLONOSCOPY DIAGNOSTIC OR SCREENING performed by Bruno Vazquez DO at [facility]    LA EGD TRANSORAL BIOPSY SINGLE/MULTIPLE N/A 7/26/2018    Dr Mandel-w/dilation over wire-51 French-mild esophageal narrowing-Yas (-)    TUBAL LIGATION      TUMOR REMOVAL      right deltoid area    UPPER GASTROINTESTINAL ENDOSCOPY  11/06/2013    Dr Negro Reyes: normal    UPPER GASTROINTESTINAL ENDOSCOPY  7/26/2018    Dr Mandel-w/dilation over wire-51 French-mild esophageal narrowing-Yas (-)         CURRENT MEDICATIONS       Previous Medications    AMITRIPTYLINE (ELAVIL) 100 MG TABLET    TAKE ONE TABLET BY MOUTH NIGHTLY. BUSPIRONE (BUSPAR) 10 MG TABLET    Take 1 tablet by mouth twice daily    CIPROFLOXACIN (CIPRO) 500 MG TABLET    Take 1 tablet by mouth 2 times daily for 7 days    CITALOPRAM (CELEXA) 40 MG TABLET    Take 1 tablet by mouth once daily    CLONAZEPAM (KLONOPIN) 0.5 MG TABLET    Take 0.5 mg by mouth 2 times daily as needed. DICLOFENAC SODIUM 1 % GEL    Apply 2 g topically 4 times daily    FLUTICASONE (FLONASE) 50 MCG/ACT NASAL SPRAY    USE 1 SPRAY(S) IN EACH NOSTRIL ONCE DAILY    GABAPENTIN (NEURONTIN) 300 MG CAPSULE    Take 2 capsules by mouth 3 times daily for 30 days.  Intended supply: 30 days    GABAPENTIN (NEURONTIN) 300 MG CAPSULE    TAKE 2 CAPSULES BY MOUTH EVERY 12 HOURS    GALCANEZUMAB-GNLM (EMGALITY) 120 MG/ML SOAJ    1 injection every 30 days    OMEPRAZOLE (PRILOSEC) 40 MG DELAYED RELEASE CAPSULE    TAKE ONE CAPSULE BY MOUTH ONCE DAILY    ONDANSETRON (ZOFRAN ODT) 4 MG DISINTEGRATING TABLET    Take 1 tablet by who either signs or Co-signs this chart in the absence of a cardiologist.    Sinus tachycardia rate 106 no acute ST wave abnormality    RADIOLOGY:   Non-plain film images such as CT, Ultrasound and MRI are read by the radiologist. Plainradiographic images are visualized and preliminarily interpreted by the emergency physician with the below findings:        Interpretation per the Radiologist below, if available at the time of this note:    CTA PULMONARY W CONTRAST   Final Result   1. Large left pleural effusion with pleural nodularity. Patchy left   lung consolidative and groundglass opacities with left lower lobe   atelectasis. Left hilar and mediastinal adenopathy. Overall,   appearance is most concerning for malignancy. Malignancy with   superimposed infection would also be a consideration. 2. Left retroperitoneal lymphadenopathy. 3. Nonobstructing right renal calculi. Signed by Dr Saray Persaud on 5/23/2021 9:03 AM      XR CHEST PORTABLE   Final Result   1. Increased left basilar opacity and effusion compared to 5/17/2021. Similar patchy left upper lung opacities.    Signed by Dr Saray Persaud on 5/23/2021 7:20 AM            ED BEDSIDE ULTRASOUND:   Performed by ED Physician - none    LABS:  Labs Reviewed   APTT - Abnormal; Notable for the following components:       Result Value    aPTT 37.6 (*)     All other components within normal limits   BLOOD GAS, ARTERIAL - Abnormal; Notable for the following components:    pO2, Arterial 60.0 (*)     Hemoglobin, Art, Extended 11.7 (*)     O2 Sat, Arterial 89.5 (*)     All other components within normal limits   CBC WITH AUTO DIFFERENTIAL - Abnormal; Notable for the following components:    WBC 12.0 (*)     RBC 3.78 (*)     Hemoglobin 11.8 (*)     Hematocrit 36.1 (*)     MCH 31.2 (*)     MCHC 32.7 (*)     Platelets 155 (*)     MPV 8.5 (*)     Neutrophils % 81.3 (*)     Lymphocytes % 11.3 (*)     Neutrophils Absolute 9.8 (*)     All other components within normal limits   COMPREHENSIVE METABOLIC PANEL - Abnormal; Notable for the following components:    Albumin 3.2 (*)     Alkaline Phosphatase 107 (*)     All other components within normal limits   D-DIMER, QUANTITATIVE - Abnormal; Notable for the following components:    D-Dimer, Quant 4.78 (*)     All other components within normal limits   RESPIRATORY PANEL, MOLECULAR, WITH COVID-19   CULTURE, BLOOD 1   CULTURE, BLOOD 2   BRAIN NATRIURETIC PEPTIDE   PROTIME-INR   TROPONIN   POTASSIUM, WHOLE BLOOD   LACTIC ACID, PLASMA   LACTIC ACID, PLASMA       All other labs were within normal range or not returned as of this dictation. EMERGENCY DEPARTMENT COURSE and DIFFERENTIALDIAGNOSIS/MDM:   Vitals:    Vitals:    05/23/21 0710 05/23/21 0711 05/23/21 0825 05/23/21 0831   BP: (!) 145/106 (!) 168/155 108/74 119/80   Pulse: 105 108 103 103   Resp: 23 28 27 30   Temp: 98.9 °F (37.2 °C)      SpO2: (!) 87% 92% 90% 91%   Weight: 178 lb (80.7 kg)      Height: 5' 3\" (1.6 m)          MDM  Pt wheezing, tachypneic, diaphoretic. Given neb with some improvement, still wheezing. Large pleural effusion. Given vanc and cefepime. dw dr Jaycee Osuna for admission. Has elevated ddimer, will scan, doubt PE at this point, but will give further eval of the effusion. CONSULTS:  PHARMACY TO DOSE VANCOMYCIN    PROCEDURES:  Unless otherwise notedbelow, none     Procedures    FINAL IMPRESSION     1. Pneumonia due to organism    2. Pleural effusion    3. Acute respiratory failure with hypoxia (HCC)    4. Abnormal chest CT          DISPOSITION/PLAN   DISPOSITION        PATIENT REFERRED TO:  @FUP@    DISCHARGE MEDICATIONS:  New Prescriptions    No medications on file         CRITICAL CARE TIME   Total Critical Care time was 45 minutes, excluding separately reportable procedures. Time includes direct patient care, reassessing patient, documenting care, and coordinating care for the patient.  Time also includes data interpretation of any lab tests or imaging that were performed. There was a high probability of clinically significant/life threatening deterioration in the patient's condition which required my urgent intervention.     (Please note that portions of this note were completed with a voice recognition program.  Efforts were made to edit the dictations butoccasionally words are mis-transcribed.)    Tunde Cam MD (electronically signed)  AttendingEmergency Physician         Tunde Cam MD  05/23/21 4133

## 2021-05-24 ENCOUNTER — APPOINTMENT (OUTPATIENT)
Dept: CT IMAGING | Age: 56
DRG: 189 | End: 2021-05-24
Payer: MEDICAID

## 2021-05-24 PROBLEM — Z93.8 S/P CHEST TUBE PLACEMENT (HCC): Status: ACTIVE | Noted: 2021-05-24

## 2021-05-24 PROBLEM — Z71.6 TOBACCO ABUSE COUNSELING: Status: ACTIVE | Noted: 2021-05-24

## 2021-05-24 PROBLEM — F17.219 CIGARETTE NICOTINE DEPENDENCE WITH NICOTINE-INDUCED DISORDER: Status: ACTIVE | Noted: 2021-05-24

## 2021-05-24 PROBLEM — J18.9 LEFT LOWER LOBE PNEUMONIA: Status: ACTIVE | Noted: 2021-05-24

## 2021-05-24 PROBLEM — J90 LARGE PLEURAL EFFUSION: Status: ACTIVE | Noted: 2021-05-24

## 2021-05-24 PROBLEM — R59.0 HILAR LYMPHADENOPATHY: Status: ACTIVE | Noted: 2021-05-24

## 2021-05-24 LAB
ANION GAP SERPL CALCULATED.3IONS-SCNC: 9 MMOL/L (ref 7–19)
BUN BLDV-MCNC: 9 MG/DL (ref 6–20)
CALCIUM SERPL-MCNC: 8.8 MG/DL (ref 8.6–10)
CHLORIDE BLD-SCNC: 104 MMOL/L (ref 98–111)
CO2: 24 MMOL/L (ref 22–29)
CREAT SERPL-MCNC: 0.6 MG/DL (ref 0.5–0.9)
GFR AFRICAN AMERICAN: >59
GFR NON-AFRICAN AMERICAN: >60
GLUCOSE BLD-MCNC: 131 MG/DL (ref 74–109)
HCT VFR BLD CALC: 35.2 % (ref 37–47)
HEMOGLOBIN: 11.2 G/DL (ref 12–16)
MCH RBC QN AUTO: 30.6 PG (ref 27–31)
MCHC RBC AUTO-ENTMCNC: 31.8 G/DL (ref 33–37)
MCV RBC AUTO: 96.2 FL (ref 81–99)
PDW BLD-RTO: 12.1 % (ref 11.5–14.5)
PLATELET # BLD: 441 K/UL (ref 130–400)
PMV BLD AUTO: 8.8 FL (ref 9.4–12.3)
POTASSIUM REFLEX MAGNESIUM: 4.7 MMOL/L (ref 3.5–5)
RBC # BLD: 3.66 M/UL (ref 4.2–5.4)
SODIUM BLD-SCNC: 137 MMOL/L (ref 136–145)
VANCOMYCIN TROUGH: 14.2 UG/ML (ref 10–20)
WBC # BLD: 14.5 K/UL (ref 4.8–10.8)

## 2021-05-24 PROCEDURE — 2580000003 HC RX 258: Performed by: EMERGENCY MEDICINE

## 2021-05-24 PROCEDURE — 6370000000 HC RX 637 (ALT 250 FOR IP): Performed by: INTERNAL MEDICINE

## 2021-05-24 PROCEDURE — 85027 COMPLETE CBC AUTOMATED: CPT

## 2021-05-24 PROCEDURE — 2000000000 HC ICU R&B

## 2021-05-24 PROCEDURE — 80202 ASSAY OF VANCOMYCIN: CPT

## 2021-05-24 PROCEDURE — 2700000000 HC OXYGEN THERAPY PER DAY

## 2021-05-24 PROCEDURE — 71250 CT THORAX DX C-: CPT

## 2021-05-24 PROCEDURE — 6360000002 HC RX W HCPCS: Performed by: EMERGENCY MEDICINE

## 2021-05-24 PROCEDURE — 36415 COLL VENOUS BLD VENIPUNCTURE: CPT

## 2021-05-24 PROCEDURE — 2580000003 HC RX 258: Performed by: INTERNAL MEDICINE

## 2021-05-24 PROCEDURE — 6370000000 HC RX 637 (ALT 250 FOR IP): Performed by: HOSPITALIST

## 2021-05-24 PROCEDURE — 6360000002 HC RX W HCPCS: Performed by: INTERNAL MEDICINE

## 2021-05-24 PROCEDURE — 80048 BASIC METABOLIC PNL TOTAL CA: CPT

## 2021-05-24 RX ORDER — NICOTINE 21 MG/24HR
1 PATCH, TRANSDERMAL 24 HOURS TRANSDERMAL DAILY
Status: DISCONTINUED | OUTPATIENT
Start: 2021-05-24 | End: 2021-05-28 | Stop reason: HOSPADM

## 2021-05-24 RX ADMIN — Medication 10 ML: at 20:12

## 2021-05-24 RX ADMIN — CITALOPRAM HYDROBROMIDE 40 MG: 20 TABLET ORAL at 07:45

## 2021-05-24 RX ADMIN — BUSPIRONE HYDROCHLORIDE 10 MG: 10 TABLET ORAL at 21:21

## 2021-05-24 RX ADMIN — AMITRIPTYLINE HYDROCHLORIDE 100 MG: 50 TABLET, FILM COATED ORAL at 21:21

## 2021-05-24 RX ADMIN — MORPHINE SULFATE 2 MG: 4 INJECTION, SOLUTION INTRAMUSCULAR; INTRAVENOUS at 17:40

## 2021-05-24 RX ADMIN — VANCOMYCIN HYDROCHLORIDE 1000 MG: 10 INJECTION, POWDER, LYOPHILIZED, FOR SOLUTION INTRAVENOUS at 07:45

## 2021-05-24 RX ADMIN — MORPHINE SULFATE 15 MG: 15 TABLET, FILM COATED, EXTENDED RELEASE ORAL at 07:45

## 2021-05-24 RX ADMIN — TOPIRAMATE 50 MG: 50 TABLET, FILM COATED ORAL at 21:21

## 2021-05-24 RX ADMIN — ENOXAPARIN SODIUM 40 MG: 40 INJECTION SUBCUTANEOUS at 15:33

## 2021-05-24 RX ADMIN — CLONAZEPAM 0.5 MG: 0.5 TABLET ORAL at 15:50

## 2021-05-24 RX ADMIN — BUSPIRONE HYDROCHLORIDE 10 MG: 10 TABLET ORAL at 07:46

## 2021-05-24 RX ADMIN — ROPINIROLE HYDROCHLORIDE 0.25 MG: 0.5 TABLET, FILM COATED ORAL at 21:20

## 2021-05-24 RX ADMIN — MORPHINE SULFATE 15 MG: 15 TABLET, FILM COATED, EXTENDED RELEASE ORAL at 15:32

## 2021-05-24 RX ADMIN — GABAPENTIN 600 MG: 300 CAPSULE ORAL at 21:20

## 2021-05-24 RX ADMIN — GABAPENTIN 600 MG: 300 CAPSULE ORAL at 07:45

## 2021-05-24 RX ADMIN — MORPHINE SULFATE 2 MG: 4 INJECTION, SOLUTION INTRAMUSCULAR; INTRAVENOUS at 12:58

## 2021-05-24 RX ADMIN — PANTOPRAZOLE SODIUM 40 MG: 40 TABLET, DELAYED RELEASE ORAL at 07:46

## 2021-05-24 RX ADMIN — Medication 10 ML: at 07:46

## 2021-05-24 RX ADMIN — GABAPENTIN 600 MG: 300 CAPSULE ORAL at 15:32

## 2021-05-24 RX ADMIN — PROPRANOLOL HYDROCHLORIDE 10 MG: 10 TABLET ORAL at 21:21

## 2021-05-24 RX ADMIN — MONTELUKAST 10 MG: 10 TABLET, FILM COATED ORAL at 21:21

## 2021-05-24 RX ADMIN — ATORVASTATIN CALCIUM 40 MG: 40 TABLET, FILM COATED ORAL at 07:46

## 2021-05-24 RX ADMIN — VANCOMYCIN HYDROCHLORIDE 1000 MG: 10 INJECTION, POWDER, LYOPHILIZED, FOR SOLUTION INTRAVENOUS at 20:12

## 2021-05-24 RX ADMIN — CEFEPIME HYDROCHLORIDE 2000 MG: 2 INJECTION, POWDER, FOR SOLUTION INTRAVENOUS at 15:32

## 2021-05-24 RX ADMIN — MORPHINE SULFATE 2 MG: 4 INJECTION, SOLUTION INTRAMUSCULAR; INTRAVENOUS at 07:45

## 2021-05-24 RX ADMIN — TOPIRAMATE 50 MG: 50 TABLET, FILM COATED ORAL at 07:45

## 2021-05-24 RX ADMIN — PROPRANOLOL HYDROCHLORIDE 10 MG: 10 TABLET ORAL at 07:46

## 2021-05-24 RX ADMIN — CEFEPIME HYDROCHLORIDE 2000 MG: 2 INJECTION, POWDER, FOR SOLUTION INTRAVENOUS at 00:31

## 2021-05-24 RX ADMIN — BUSPIRONE HYDROCHLORIDE 10 MG: 10 TABLET ORAL at 15:32

## 2021-05-24 RX ADMIN — CEFEPIME HYDROCHLORIDE 2000 MG: 2 INJECTION, POWDER, FOR SOLUTION INTRAVENOUS at 07:45

## 2021-05-24 ASSESSMENT — PAIN SCALES - GENERAL
PAINLEVEL_OUTOF10: 10
PAINLEVEL_OUTOF10: 0
PAINLEVEL_OUTOF10: 0

## 2021-05-24 NOTE — PROGRESS NOTES
[place], [place], [address]    DEPARTMENT OF HOSPITALIST MEDICINE      PROGRESS  NOTE:    NOTE: Portions of this note have been copied forward, however, changed to reflect the most current clinical status of this patient. Patient:  Tyler Kahn  YOB: 1965  Date of Service: 5/24/2021  MRN: [id number]   Acct: [de-identified]   Primary Care Physician: JERILYN Ramirez  Advance Directive: Full Code  Admit Date: 5/23/2021       Hospital Day: 1      CHIEF COMPLAINT:  Chief Complaint   Patient presents with    Shortness of Breath     Started 4- 5 weeks ago, getting worse today, pt sitting in tripod position       SUBJECTIVE:  Patient still feeling a little tired and fatigued. She has a chest tube in place draining well. [address]  COURSE:    5/24/2021  Patient had left-sided chest tube placed due to large pleural effusion which is draining well. Patient is symptomatically improving. Pulmonary is awaiting cytology on the pleural effusion fluid to determine whether it is tumor/infectious etiology before consulting oncology. 5/23/2021  HISTORY OF PRESENT ILLNESS:   The patient is a 48 y.o. female presents with worsening shortness of breath. She has not felt well for the last 4 weeks. She was recently started on antibiotics as an outpatient for left-sided pneumonia. Her breathing got significantly worse this morning prompting her to come into the emergency department. She had a CT angiogram of the chest as part of her work-up. This was negative for PE, unfortunately, it appears as though that she may have metastatic lung carcinoma with a very large left-sided pleural effusion. I did notify her of these findings. We will get pulmonology and oncology on board. I feel that she will most likely also need a left-sided chest tube for drainage. She is obviously distraught at the news.       REVIEW  OF  SYSTEMS:    Constitutional:  No fevers, chills, nausea, vomiting, + tiredness and fatigue   Lungs:   No hemoptysis, pleurisy, cough, +SOB, + left-sided chest tube in place   Heart:  No chest pressure with exertion, palpitations,    Abdomen:   No new masses, no bright red blood per rectum   Extremities:  + difficulty ambulation due to weakness, no new lesions   Neurologic: No new motor or sensory changes       PAST MEDICAL HISTORY:  Past Medical History:   Diagnosis Date    Anxiety     Arthritis     Chronic tension-type headache, intractable 1/8/2016    Depression     Headache(784.0)     migraine    Lupus ([address]     Neck pain 1/8/2016         PAST SURGICAL HISTORY:  Past Surgical History:   Procedure Laterality Date    COLONOSCOPY  10/23/2013    Dr Karyle Kirk: internal hemorrhoids; hyperplastic polyps (5 yr recal for screening)    HYSTERECTOMY      NECK SURGERY  07/26/2016    Dr Cristal Rinne FLX DX W/COLLANN Z[place]ripa 1978 PFRMD N/A 2/6/2017    COLONOSCOPY DIAGNOSTIC OR SCREENING performed by Christina Guzman DO at [facility] OR    [place] EGD TRANSORAL BIOPSY SINGLE/MULTIPLE N/A 7/26/2018    Dr Roland/dilation over wire-51 German-mild esophageal narrowing-Yas (-)    TUBAL LIGATION      TUMOR REMOVAL      right deltoid area    UPPER GASTROINTESTINAL ENDOSCOPY  11/06/2013    Dr Karyle Kirk: normal    UPPER GASTROINTESTINAL ENDOSCOPY  7/26/2018    Dr Roland/dilation over wire-51 German-mild esophageal narrowing-Yas (-)        FAMILY HISTORY:  Family History   Problem Relation Age of Onset    Colon Cancer Maternal Aunt     Colon Polyps Maternal Aunt     Liver Cancer Neg Hx     Liver Disease Neg Hx     Rectal Cancer Neg Hx     Stomach Cancer Neg Hx            OBJECTIVE:  BP (!) 91/57   Pulse 94   Temp 97.8 °F (36.6 °C) (Temporal)   Resp 23   Ht 5' 3\" (1.6 m)   Wt 173 lb 4.8 oz (78.6 kg)   SpO2 90%   BMI 30.70 kg/m²   I/O this shift:   In: 500 [P.O.:250; IV Piggyback:250]  Out: 0     PHYSICAL  EXAMINATION:    SEN:  Awake, alert, oriented x 3, patient appears tired and fatigued   Head/Eyes:  Normocephalic, atraumatic, EOMI and PERRLA bilaterally   Respiratory:   Bilateral decreased air entry in both lung fields, mild B/L crackles, + left-sided chest tube in place   Cardiovascular:  Regular rate and rhythm, S1+S2+0, no murmurs/rubs   Abdomen:   Soft, non-tender, bowel sounds +ve, no organomegaly   Extremities: Moves all, decreased range of motion, no edema   Neurologic: Awake, alert, oriented x 3, cranial nerves II-XII intact, no focal neurological deficits, sensory system intact   Psychiatric: Normal mood, non-suicidal       CURRENT MEDICATIONS:  Scheduled:   nicotine  1 patch Transdermal Daily    vancomycin (VANCOCIN) intermittent dosing (placeholder)   Other RX Placeholder    gabapentin  600 mg Oral TID    rOPINIRole  0.25 mg Oral Nightly    pantoprazole  40 mg Oral QAM AC    amitriptyline  100 mg Oral Nightly    propranolol  10 mg Oral BID    topiramate  50 mg Oral BID    citalopram  40 mg Oral Daily    busPIRone  10 mg Oral TID    atorvastatin  40 mg Oral Daily    sodium chloride flush  5-40 mL Intravenous 2 times per day    enoxaparin  40 mg Subcutaneous Q24H    cefepime  2,000 mg Intravenous Q8H    montelukast  10 mg Oral Nightly    vancomycin  1,000 mg Intravenous Q12H    morphine  15 mg Oral Q8H        PRN:  clonazePAM, 0.5 mg, BID PRN  ondansetron, 4 mg, Q8H PRN  sodium chloride flush, 10 mL, PRN  sodium chloride, 25 mL, PRN  promethazine, 12.5 mg, Q6H PRN   Or  ondansetron, 4 mg, Q6H PRN  polyethylene glycol, 17 g, Daily PRN  acetaminophen, 650 mg, Q6H PRN   Or  acetaminophen, 650 mg, Q6H PRN  morphine, 2 mg, Q4H PRN        Infusions:   sodium chloride         Laboratory Data:  Recent Labs     05/23/21  0720 05/24/21  0123   WBC 12.0* 14.5*   HGB 11.8* 11.2*   * 441*     Recent Labs     05/23/21  0720 05/23/21  0724 05/24/21  0123     --  137   K 4.1 3.6 4.7     --  104   CO2 26  --  24   BUN 9  --  9   CREATININE 0.7  --  0.6 GLUCOSE 98  --  131*     Recent Labs     05/23/21 0720   AST 15   ALT 6   BILITOT 0.3   ALKPHOS 107*     Troponin T:   Recent Labs     05/23/21 0720   TROPONINI <0.01     Pro-BNP: No results for input(s): BNP in the last 72 hours. INR:   Recent Labs     05/23/21 0720   INR 1.12     UA:No results for input(s): NITRITE, COLORU, PHUR, LABCAST, WBCUA, RBCUA, MUCUS, TRICHOMONAS, YEAST, BACTERIA, CLARITYU, SPECGRAV, LEUKOCYTESUR, UROBILINOGEN, BILIRUBINUR, BLOODU, GLUCOSEU, AMORPHOUS in the last 72 hours. Invalid input(s): [facility]  A1C: No results for input(s): LABA1C in the last 72 hours. ABG:  Recent Labs     05/23/21 0724   PHART 7.440   ERP6BPA 35.0   PO2ART 60.0*   TQA4WZG 23.8   BEART 0.0   HGBAE 11.7*   Y6WPFLTK 89.5*   CARBOXHGBART 4.9         Imaging:    CT CHEST WO CONTRAST    Result Date: 5/24/2021  The interval significant decrease in the left-sided pleural effusion as detailed above. Multifocal nodular thickening of the pleura is similar to the previous study and may represent a neoplastic process. Evidence of mediastinal lymphadenopathy. Question left hilar lymphadenopathy. Proximal abdominal/celiac lymphadenopathy. Persistent nodular infiltrate in the left lower lobe superior segment. A nonobstructing right renal calculus. Signed by Dr Paulette Hayes on 5/24/2021 7:35 AM    XR CHEST PORTABLE    Result Date: 5/23/2021  1. Interval placement small bore left chest tube. Decreased left pleural effusion. Improved aeration of the left lung, which has persistent mild diffuse hazy opacity. Signed by Dr Eileen Kirkpatrick on 5/23/2021 2:27 PM    XR CHEST PORTABLE    Result Date: 5/23/2021  1. Increased left basilar opacity and effusion compared to 5/17/2021. Similar patchy left upper lung opacities. Signed by Dr Eileen Kirkpatrick on 5/23/2021 7:20 AM    CTA PULMONARY W CONTRAST    Result Date: 5/23/2021  1. Large left pleural effusion with pleural nodularity.  Patchy left lung consolidative and groundglass opacities with left lower lobe atelectasis. Left hilar and mediastinal adenopathy. Overall, appearance is most concerning for malignancy. Malignancy with superimposed infection would also be a consideration. 2. Left retroperitoneal lymphadenopathy. 3. Nonobstructing right renal calculi. Signed by Dr Fransisco aMys on 5/23/2021 9:03 AM       ASSESSMENT & PLAN:    Active Problems:    Chronic constipation    Heartburn    Chronic GERD    Depression    Chronic tension-type headache, intractable    Cervical radiculopathy    Anxiety    Nausea and vomiting    Esophageal dysphagia    Acute respiratory failure with hypoxia (HCC)    Large pleural effusion    S/P chest tube placement (HCC)    Left lower lobe pneumonia    Hilar lymphadenopathy    Tobacco abuse counseling    Cigarette nicotine dependence with nicotine-induced disorder  Resolved Problems:    * No resolved hospital problems. *      Continue with current medication  Continue with IV diuretics in the form of cefepime and vancomycin  Monitor cultures  Continue with the left-sided chest tube which is draining well  Pulmonary recommendations reviewed, appreciated and agreed with  Monitor labs and leukocytosis closely  Follow-up on frozen section from the bronchoscopy sample to rule out malignancy  May order oncology consult for evaluation and further treatment recommendations if lung biopsy from bronchoscopy is positive for malignancy  PT/OT evaluation given  Case management consult for DC planning  May downgrade patient to regular floor today if patient remains stable    Chronic medical issues . .. Continue with home meds. Monitor patient closely while admitted. Advised very close f/u with patient's PCP as an outpatient to address chronic medical issues. Dependence on nicotine from cigarettes          Tobacco abuse counseling  Patient smokes cigarettes on a chronic basis. Strictly advised patient to cut down on or quit smoking. Nicotine patch offered.  ~5 minutes spent on tobacco cessation counseling with the patient. Websites - http://smokefree. gov & Legal[employer]. com   °Quitlines - 1-800-QUIT-NOW (6-175.595.5270)   °Smokefree Apps - QuitSTART Derick   °Text Messages - SmokefreeTXT (send the word QUIT to 06220)       Repeat labs in a.m. Electrolyte replacement as per protocol. Patient will be monitored very closely on the floor. Further recommendations as per the hospital course. Discharge Plan: To be determined as per the hospital course      Patient  is on DVT prophylaxis  Current medications reviewed  Lab work reviewed  Radiology/Chest x-ray films reviewed  Treatment recommendations from suspecialities reviewed, appreciated and agreed with  Discussed with the nurse and addressed all questions/concerns  Discussed with Patient and/or Family at the bedside in detail . .. they understand and agree with the management plan. I attest that I spend >35 minutes engaged in critical care of this patient. This includes review of EMR data, imaging, bedside evaluation, patient/family counseling and coordination of care with nursing, providers and consultants. Eulogio Horvath MD  5/24/2021 2:00 PM      DISCLAIMER: This note was created with electronic voice recognition which does have occasional errors. If you have any questions regarding the content within the note please do not hesitate to contact me. .. Thanks.

## 2021-05-24 NOTE — PLAN OF CARE
Problem: Pain:  Goal: Pain level will decrease  Description: Pain level will decrease  5/24/2021 0141 by Jamie Lorenzana RN  Outcome: Ongoing     Problem: Pain:  Goal: Control of acute pain  Description: Control of acute pain  5/24/2021 0141 by Jamie Lorenzana RN  Outcome: Ongoing     Problem: Pain:  Goal: Control of chronic pain  Description: Control of chronic pain  5/24/2021 0141 by Jamie Lorenzana RN  Outcome: Ongoing     Problem: Falls - Risk of:  Goal: Will remain free from falls  Description: Will remain free from falls  Outcome: Ongoing     Problem: Falls - Risk of:  Goal: Absence of physical injury  Description: Absence of physical injury  Outcome: Ongoing

## 2021-05-24 NOTE — PROGRESS NOTES
Pt up to commode and chair with assistance of 2 RNs   Pt became SOA during move but was able to recover once seated  Pt now sitting up in chair with little complaint of SOA

## 2021-05-24 NOTE — PROGRESS NOTES
Spoke with pathology about the cytology from the pleural fluid.  They stated it was to be ran today and they have the sample there to run

## 2021-05-24 NOTE — PROGRESS NOTES
Pt tearful d/t spilling coffee, I explained that it was not an issue and helped her get cleaned up.  She said \"i'm helpless, and I hate feeling helpless\"

## 2021-05-24 NOTE — CARE COORDINATION
Date / Time of Evaluation: 5/24/2021 4:58 PM  Assessment Completed by: IMELDA Kirkpatrick    Patient Admission Status: Inpatient [101]    [address]    [phone] (home)   Telephone Information:   Mobile [phone]       (Best Practice:  Have patient / caregiver verify above address and phone number by stating out loud their current address and reachable phone number.)  Is above information correct? yes      Current PCP:  JERILYN Gastelum  PCP verified? yes    Initial Assessment Completed at bedside with:  Pt; laying in bed; oxygen in place    Emergency Contacts:  Extended Emergency Contact Information  Primary Emergency Contact: [address] Phone: [phone]  Relation: Child  Secondary Emergency Contact: [address] [place] Phone: [phone]  Mobile Phone: [phone]  Relation: Parent    Advance Directives: Code Status:  Full Code    Financial:  Payor: [facility] / Plan: [facility] / Product Type: *No Product type* /     Pre-Cert required for SNF:  no    Have Long Term Care Insurance:  no    Pharmacy:   [facility] [address] SHANIKA GUERRERO [address] [phone] - F [phone]  [address] [place] [address]  Phone: [phone] Fax: [address] [facility] [phone] [facility] [address] [address]  Phone: [phone] Fax: [address] [facility], [address]  Phone: [phone] Fax: [phone]      Potential assistance purchasing medications?   no    ADLS:  Support System:  Children, Family Members    Current Home Environment:  Home alone-has a friend and a son that are nearby  Steps:  no    Plans to RETURN to current housing: yes  Barriers to RETURNING to current housing: Currently ACTIVE with Home Health CARE:  no  Home Health Care Agency:  no    DME Provider:  no    Has a pulse oximetry unit at home: no    Had [address] prior to admission:  no    Active with HD/PD prior to admission: no  Nephrologist:  no  HD Center:  no    Transition Plan:   home with [facility]? Uncertain at current    Transportation PLAN for Discharge:  Private vehicle    Factors facilitating achievement of predicted outcomes: medical stability    Barriers to discharge: Additional CM/SW Notes: Pt laying in bed; oxygen in place; stated she doesn't normally wear oxygen; home alone; her son or friend help if needed; her mother lives in [place] and she came to [facility] to check on her and is staying at [facility]; not current with [facility] or any DME; stated she is normally independent with ADL/IADL needs; she is anxious at current re: testing; support given; will follow          Paris Hero and/or her family were provided with choice of provider.         Antoine Miller Se [address] [place] [address]  Care Management Department  Ph:  [phone]   Fax: [phone]  Electronically signed by IMELDA Miller Se on 5/24/2021 at 5:01 PM

## 2021-05-25 ENCOUNTER — APPOINTMENT (OUTPATIENT)
Dept: GENERAL RADIOLOGY | Age: 56
DRG: 189 | End: 2021-05-25
Payer: MEDICAID

## 2021-05-25 PROBLEM — Z51.5 PALLIATIVE CARE PATIENT: Status: ACTIVE | Noted: 2021-05-25

## 2021-05-25 LAB
ANION GAP SERPL CALCULATED.3IONS-SCNC: 11 MMOL/L (ref 7–19)
BASE EXCESS ARTERIAL: 0.3 MMOL/L (ref -2–2)
BUN BLDV-MCNC: 12 MG/DL (ref 6–20)
CALCIUM SERPL-MCNC: 8.7 MG/DL (ref 8.6–10)
CARBOXYHEMOGLOBIN ARTERIAL: 1.8 % (ref 0–5)
CHLORIDE BLD-SCNC: 101 MMOL/L (ref 98–111)
CO2: 24 MMOL/L (ref 22–29)
CREAT SERPL-MCNC: 0.7 MG/DL (ref 0.5–0.9)
GFR AFRICAN AMERICAN: >59
GFR NON-AFRICAN AMERICAN: >60
GLUCOSE BLD-MCNC: 111 MG/DL (ref 70–99)
GLUCOSE BLD-MCNC: 114 MG/DL (ref 74–109)
HCO3 ARTERIAL: 25.4 MMOL/L (ref 22–26)
HCT VFR BLD CALC: 35.3 % (ref 37–47)
HEMOGLOBIN, ART, EXTENDED: 11.4 G/DL (ref 12–16)
HEMOGLOBIN: 11.1 G/DL (ref 12–16)
MCH RBC QN AUTO: 30.7 PG (ref 27–31)
MCHC RBC AUTO-ENTMCNC: 31.4 G/DL (ref 33–37)
MCV RBC AUTO: 97.8 FL (ref 81–99)
METHEMOGLOBIN ARTERIAL: 1.2 %
O2 CONTENT ARTERIAL: 15.2 ML/DL
O2 SAT, ARTERIAL: 94.1 %
O2 THERAPY: ABNORMAL
PCO2 ARTERIAL: 42 MMHG (ref 35–45)
PDW BLD-RTO: 12.4 % (ref 11.5–14.5)
PERFORMED ON: ABNORMAL
PH ARTERIAL: 7.39 (ref 7.35–7.45)
PLATELET # BLD: 475 K/UL (ref 130–400)
PMV BLD AUTO: 8.7 FL (ref 9.4–12.3)
PO2 ARTERIAL: 77 MMHG (ref 80–100)
POTASSIUM REFLEX MAGNESIUM: 4.3 MMOL/L (ref 3.5–5)
POTASSIUM, WHOLE BLOOD: 3.5
RBC # BLD: 3.61 M/UL (ref 4.2–5.4)
SODIUM BLD-SCNC: 136 MMOL/L (ref 136–145)
WBC # BLD: 11.6 K/UL (ref 4.8–10.8)

## 2021-05-25 PROCEDURE — 6360000002 HC RX W HCPCS: Performed by: INTERNAL MEDICINE

## 2021-05-25 PROCEDURE — 2580000003 HC RX 258: Performed by: EMERGENCY MEDICINE

## 2021-05-25 PROCEDURE — 6370000000 HC RX 637 (ALT 250 FOR IP): Performed by: PHYSICIAN ASSISTANT

## 2021-05-25 PROCEDURE — 99254 IP/OBS CNSLTJ NEW/EST MOD 60: CPT | Performed by: PHYSICIAN ASSISTANT

## 2021-05-25 PROCEDURE — 85027 COMPLETE CBC AUTOMATED: CPT

## 2021-05-25 PROCEDURE — 84132 ASSAY OF SERUM POTASSIUM: CPT

## 2021-05-25 PROCEDURE — 2700000000 HC OXYGEN THERAPY PER DAY

## 2021-05-25 PROCEDURE — 6370000000 HC RX 637 (ALT 250 FOR IP): Performed by: HOSPITALIST

## 2021-05-25 PROCEDURE — 6370000000 HC RX 637 (ALT 250 FOR IP): Performed by: INTERNAL MEDICINE

## 2021-05-25 PROCEDURE — 82947 ASSAY GLUCOSE BLOOD QUANT: CPT

## 2021-05-25 PROCEDURE — 99233 SBSQ HOSP IP/OBS HIGH 50: CPT | Performed by: INTERNAL MEDICINE

## 2021-05-25 PROCEDURE — 82803 BLOOD GASES ANY COMBINATION: CPT

## 2021-05-25 PROCEDURE — 36415 COLL VENOUS BLD VENIPUNCTURE: CPT

## 2021-05-25 PROCEDURE — 1210000000 HC MED SURG R&B

## 2021-05-25 PROCEDURE — 2580000003 HC RX 258: Performed by: INTERNAL MEDICINE

## 2021-05-25 PROCEDURE — 80048 BASIC METABOLIC PNL TOTAL CA: CPT

## 2021-05-25 PROCEDURE — 6360000002 HC RX W HCPCS: Performed by: EMERGENCY MEDICINE

## 2021-05-25 PROCEDURE — 94640 AIRWAY INHALATION TREATMENT: CPT

## 2021-05-25 PROCEDURE — 36600 WITHDRAWAL OF ARTERIAL BLOOD: CPT

## 2021-05-25 PROCEDURE — 6360000002 HC RX W HCPCS: Performed by: PHYSICIAN ASSISTANT

## 2021-05-25 PROCEDURE — 71045 X-RAY EXAM CHEST 1 VIEW: CPT

## 2021-05-25 RX ORDER — HYDROCODONE BITARTRATE AND ACETAMINOPHEN 5; 325 MG/1; MG/1
1 TABLET ORAL EVERY 4 HOURS PRN
Status: DISCONTINUED | OUTPATIENT
Start: 2021-05-25 | End: 2021-05-28 | Stop reason: HOSPADM

## 2021-05-25 RX ORDER — HYDROCODONE BITARTRATE AND ACETAMINOPHEN 5; 325 MG/1; MG/1
2 TABLET ORAL EVERY 4 HOURS PRN
Status: DISCONTINUED | OUTPATIENT
Start: 2021-05-25 | End: 2021-05-28 | Stop reason: HOSPADM

## 2021-05-25 RX ORDER — DOCUSATE SODIUM 100 MG/1
100 CAPSULE, LIQUID FILLED ORAL DAILY
Status: DISCONTINUED | OUTPATIENT
Start: 2021-05-25 | End: 2021-05-28 | Stop reason: HOSPADM

## 2021-05-25 RX ORDER — NALOXONE HYDROCHLORIDE 0.4 MG/ML
0.4 INJECTION, SOLUTION INTRAMUSCULAR; INTRAVENOUS; SUBCUTANEOUS PRN
Status: DISCONTINUED | OUTPATIENT
Start: 2021-05-25 | End: 2021-05-28 | Stop reason: HOSPADM

## 2021-05-25 RX ADMIN — CEFEPIME HYDROCHLORIDE 2000 MG: 2 INJECTION, POWDER, FOR SOLUTION INTRAVENOUS at 00:16

## 2021-05-25 RX ADMIN — ATORVASTATIN CALCIUM 40 MG: 40 TABLET, FILM COATED ORAL at 08:01

## 2021-05-25 RX ADMIN — CEFEPIME HYDROCHLORIDE 2000 MG: 2 INJECTION, POWDER, FOR SOLUTION INTRAVENOUS at 15:57

## 2021-05-25 RX ADMIN — GABAPENTIN 600 MG: 300 CAPSULE ORAL at 14:04

## 2021-05-25 RX ADMIN — MORPHINE SULFATE 2 MG: 4 INJECTION, SOLUTION INTRAMUSCULAR; INTRAVENOUS at 14:15

## 2021-05-25 RX ADMIN — NALXONE HYDROCHLORIDE 0.4 MG: 0.4 INJECTION INTRAMUSCULAR; INTRAVENOUS; SUBCUTANEOUS at 02:56

## 2021-05-25 RX ADMIN — TOPIRAMATE 50 MG: 50 TABLET, FILM COATED ORAL at 08:01

## 2021-05-25 RX ADMIN — BUSPIRONE HYDROCHLORIDE 10 MG: 10 TABLET ORAL at 08:01

## 2021-05-25 RX ADMIN — DOCUSATE SODIUM 100 MG: 100 CAPSULE, LIQUID FILLED ORAL at 15:57

## 2021-05-25 RX ADMIN — CLONAZEPAM 0.5 MG: 0.5 TABLET ORAL at 23:21

## 2021-05-25 RX ADMIN — VANCOMYCIN HYDROCHLORIDE 1000 MG: 10 INJECTION, POWDER, LYOPHILIZED, FOR SOLUTION INTRAVENOUS at 23:18

## 2021-05-25 RX ADMIN — CEFEPIME HYDROCHLORIDE 2000 MG: 2 INJECTION, POWDER, FOR SOLUTION INTRAVENOUS at 23:20

## 2021-05-25 RX ADMIN — IPRATROPIUM BROMIDE 0.5 MG: 0.5 SOLUTION RESPIRATORY (INHALATION) at 19:10

## 2021-05-25 RX ADMIN — CITALOPRAM HYDROBROMIDE 40 MG: 20 TABLET ORAL at 08:01

## 2021-05-25 RX ADMIN — PROPRANOLOL HYDROCHLORIDE 10 MG: 10 TABLET ORAL at 08:01

## 2021-05-25 RX ADMIN — NALXONE HYDROCHLORIDE 0.4 MG: 0.4 INJECTION INTRAMUSCULAR; INTRAVENOUS; SUBCUTANEOUS at 05:13

## 2021-05-25 RX ADMIN — VANCOMYCIN HYDROCHLORIDE 1000 MG: 10 INJECTION, POWDER, LYOPHILIZED, FOR SOLUTION INTRAVENOUS at 08:02

## 2021-05-25 RX ADMIN — GABAPENTIN 600 MG: 300 CAPSULE ORAL at 23:22

## 2021-05-25 RX ADMIN — MONTELUKAST 10 MG: 10 TABLET, FILM COATED ORAL at 23:21

## 2021-05-25 RX ADMIN — BUSPIRONE HYDROCHLORIDE 10 MG: 10 TABLET ORAL at 14:05

## 2021-05-25 RX ADMIN — BUSPIRONE HYDROCHLORIDE 10 MG: 10 TABLET ORAL at 23:21

## 2021-05-25 RX ADMIN — TOPIRAMATE 50 MG: 50 TABLET, FILM COATED ORAL at 23:22

## 2021-05-25 RX ADMIN — PROPRANOLOL HYDROCHLORIDE 10 MG: 10 TABLET ORAL at 23:21

## 2021-05-25 RX ADMIN — ROPINIROLE HYDROCHLORIDE 0.25 MG: 0.5 TABLET, FILM COATED ORAL at 23:21

## 2021-05-25 RX ADMIN — HYDROCODONE BITARTRATE AND ACETAMINOPHEN 2 TABLET: 5; 325 TABLET ORAL at 17:23

## 2021-05-25 RX ADMIN — AMITRIPTYLINE HYDROCHLORIDE 100 MG: 50 TABLET, FILM COATED ORAL at 23:21

## 2021-05-25 RX ADMIN — GABAPENTIN 600 MG: 300 CAPSULE ORAL at 08:01

## 2021-05-25 RX ADMIN — ENOXAPARIN SODIUM 40 MG: 40 INJECTION SUBCUTANEOUS at 14:05

## 2021-05-25 RX ADMIN — PANTOPRAZOLE SODIUM 40 MG: 40 TABLET, DELAYED RELEASE ORAL at 08:05

## 2021-05-25 RX ADMIN — Medication 10 ML: at 23:20

## 2021-05-25 RX ADMIN — Medication 10 ML: at 08:06

## 2021-05-25 RX ADMIN — HYDROCODONE BITARTRATE AND ACETAMINOPHEN 2 TABLET: 5; 325 TABLET ORAL at 23:23

## 2021-05-25 RX ADMIN — MORPHINE SULFATE 15 MG: 15 TABLET, FILM COATED, EXTENDED RELEASE ORAL at 00:23

## 2021-05-25 RX ADMIN — IPRATROPIUM BROMIDE 0.5 MG: 0.5 SOLUTION RESPIRATORY (INHALATION) at 14:49

## 2021-05-25 RX ADMIN — CEFEPIME HYDROCHLORIDE 2000 MG: 2 INJECTION, POWDER, FOR SOLUTION INTRAVENOUS at 08:02

## 2021-05-25 ASSESSMENT — PAIN DESCRIPTION - PROGRESSION
CLINICAL_PROGRESSION: NOT CHANGED
CLINICAL_PROGRESSION: NOT CHANGED

## 2021-05-25 ASSESSMENT — PAIN - FUNCTIONAL ASSESSMENT
PAIN_FUNCTIONAL_ASSESSMENT: INTOLERABLE, UNABLE TO DO ANY ACTIVE OR PASSIVE ACTIVITIES
PAIN_FUNCTIONAL_ASSESSMENT: PREVENTS OR INTERFERES SOME ACTIVE ACTIVITIES AND ADLS

## 2021-05-25 ASSESSMENT — PAIN DESCRIPTION - PAIN TYPE
TYPE: CHRONIC PAIN
TYPE: CHRONIC PAIN
TYPE: ACUTE PAIN;CHRONIC PAIN

## 2021-05-25 ASSESSMENT — PAIN DESCRIPTION - LOCATION
LOCATION: CHEST;NECK
LOCATION: BACK
LOCATION: BACK;NECK

## 2021-05-25 ASSESSMENT — PAIN DESCRIPTION - ONSET
ONSET: ON-GOING
ONSET: ON-GOING

## 2021-05-25 ASSESSMENT — PAIN DESCRIPTION - DESCRIPTORS
DESCRIPTORS: ACHING
DESCRIPTORS: ACHING;SORE

## 2021-05-25 ASSESSMENT — PAIN SCALES - GENERAL
PAINLEVEL_OUTOF10: 4
PAINLEVEL_OUTOF10: 5
PAINLEVEL_OUTOF10: 0
PAINLEVEL_OUTOF10: 7
PAINLEVEL_OUTOF10: 8
PAINLEVEL_OUTOF10: 9
PAINLEVEL_OUTOF10: 5
PAINLEVEL_OUTOF10: 6

## 2021-05-25 ASSESSMENT — PAIN DESCRIPTION - ORIENTATION: ORIENTATION: UPPER

## 2021-05-25 ASSESSMENT — PAIN DESCRIPTION - FREQUENCY
FREQUENCY: CONTINUOUS
FREQUENCY: CONTINUOUS

## 2021-05-25 NOTE — CONSULTS
ASC OR    RI EGD TRANSORAL BIOPSY SINGLE/MULTIPLE N/A 7/26/2018    Dr Mandel-w/dilation over wire-51 French-mild esophageal narrowing-Yas (-)    TUBAL LIGATION      TUMOR REMOVAL      right deltoid area    UPPER GASTROINTESTINAL ENDOSCOPY  11/06/2013    Dr Jenny Bonilla: normal    UPPER GASTROINTESTINAL ENDOSCOPY  7/26/2018    Dr Manedl-w/dilation over wire-51 French-mild esophageal narrowing-Yas (-)     Home Medications:  Prior to Admission medications    Medication Sig Start Date End Date Taking? Authorizing Provider   ciprofloxacin (CIPRO) 500 MG tablet Take 1 tablet by mouth 2 times daily for 7 days 5/18/21 5/25/21  JERILYN Guzman   ondansetron (ZOFRAN ODT) 4 MG disintegrating tablet Take 1 tablet by mouth every 8 hours as needed for Nausea or Vomiting 5/18/21   JERILYN Guzman   citalopram (CELEXA) 40 MG tablet Take 1 tablet by mouth once daily 3/21/21   JERILYN Guzman   sucralfate (CARAFATE) 1 GM tablet Take 1 tablet by mouth twice daily 3/21/21   JERILYN Guzman   busPIRone (BUSPAR) 10 MG tablet Take 1 tablet by mouth twice daily 3/21/21   JERILYN Guzman   simvastatin (ZOCOR) 10 MG tablet Take 1 tablet by mouth nightly 3/21/21   JERILYN Guzman   Galcanezumab-[facility]) 120 MG/ML SOAJ 1 injection every 30 days 2/23/21   JERILYN Guzman   clonazePAM (KLONOPIN) 0.5 MG tablet Take 0.5 mg by mouth 2 times daily as needed.     Historical Provider, MD   SUMAtriptan (IMITREX) 25 MG tablet Take 1 tablet prn migraine 2/2/21   JERILYN Guzman   rOPINIRole (REQUIP) 0.25 MG tablet Take 1 tablet by mouth nightly 2/2/21   JERILYN Guzman   omeprazole (PRILOSEC) 40 MG delayed release capsule TAKE ONE CAPSULE BY MOUTH ONCE DAILY 2/2/21   JERILYN Guzman   amitriptyline (ELAVIL) 100 MG tablet TAKE ONE TABLET BY MOUTH NIGHTLY. 2/2/21   JERILYN Guzman   propranolol (INDERAL) 10 MG tablet TAKE ONE TABLET BY MOUTH THREE TIMES DAILY 2/2/21   JERILYN Guzman   fluticasone (FLONASE) 50 MCG/ACT nasal spray USE 1 SPRAY(S) IN EACH NOSTRIL ONCE DAILY 2/2/21   Oletha Rosamaria APRN   topiramate (TOPAMAX) 50 MG tablet TAKE 1 TABLET BY MOUTH TWICE DAILY 2/2/21   Oletha Rosamaria APRN   gabapentin (NEURONTIN) 300 MG capsule TAKE 2 CAPSULES BY MOUTH EVERY 12 HOURS 11/1/19   Historical Provider, MD   diclofenac sodium 1 % GEL Apply 2 g topically 4 times daily 9/13/19   JERILYN Nevarez   gabapentin (NEURONTIN) 300 MG capsule Take 2 capsules by mouth 3 times daily for 30 days. Intended supply: 30 days 8/6/19 4/3/20  Oletha Rosamaria APRN     Allergies:    Ventolin [albuterol]    Social History:    The patient currently lives at home. Tobacco:   reports that she has been smoking cigarettes and e-cigarettes. She has a 16.00 pack-year smoking history. She has never used smokeless tobacco.  Alcohol:   reports no history of alcohol use.   Illicit Drugs: denies    Family History:      Problem Relation Age of Onset    Colon Cancer Maternal Aunt     Colon Polyps Maternal Aunt     Liver Cancer Neg Hx     Liver Disease Neg Hx     Rectal Cancer Neg Hx     Stomach Cancer Neg Hx      Review of Systems:   Constitutional / general:  Denies fever / chills / sweats +fatigue  Head:  Denies headache / neck stiffness / trauma / visual change  Eyes:  Denies blurry vision / acute visual change or loss / itching / redness  ENT: Denies sore throat / hoarseness / nasal drainage / ear pain  CV:  Denies chest pain / palpitations/ orthopnea   Respiratory:  + cough / +shortness of breath / +sputum / hemoptysis  GI: Denies nausea / vomiting / abdominal pain / diarrhea / constipation  :  Denies dysuria / hesitancy / urgency / hematuria   Neuro: Denies paralysis / syncope / seizure / dysphagia / headache / paresthesias  Musculoskeletal:  +muscle weakness /joint stiffness /+ pain  Vascular: Denies edema / claudication / varicosities  Heme / endocrine: Denies easy bruising / bleeding / excessive sweating / heat or cold intolerance  Psychiatric:  + depression / +anxiety / insomnia / mood changes  Skin:  Denies new rashes / lesions / skin hair or nail changes    14 point review of systems is negative except as specifically addressed above. Physical Examination:  /62   Pulse 92   Temp 98.1 °F (36.7 °C) (Temporal)   Resp 20   Ht 5' 3\" (1.6 m)   Wt 174 lb 9.6 oz (79.2 kg)   SpO2 94%   BMI 30.93 kg/m²   General appearance: 47 yo female, laying comfortably in supine position with head of bed elevated, ill appearing no acute distress  Head: Normocephalic, without obvious abnormality, atraumatic  Eyes: conjunctivae/corneas clear. PERRL, EOM's intact. Ears: normal external ears and nose, throat without exudate  Neck: no adenopathy, no carotid bruit, no JVD, supple, symmetrical, trachea midline   Lungs: Upper airway rhonchi bilaterally, diminished at bases, worse on left, chest tube present LLL with serosanguinous drainage  Heart: regular rate and rhythm, S1, S2 normal, no murmur  Abdomen: soft, non-tender; non-distended, bowel sounds present  Extremities:No lower extremity edema,  No erythema, no tenderness to palpation  Skin: Skin color, texture, turgor normal. No rashes or lesions  Lymphatic: No palpable lymph node enlargment  Neurologic: Somnolent, difficult to awaken. Disoriented.  Follows commands with redirection, oriented to self, place, initially thought it was 1995  Psychiatric: Depressed mood, anxious, tearful     Diagnostic Data:  CBC:  Recent Labs     05/23/21  0720 05/24/21  0123 05/25/21  0150   WBC 12.0* 14.5* 11.6*   HGB 11.8* 11.2* 11.1*   HCT 36.1* 35.2* 35.3*   * 441* 475*     BMP:  Recent Labs     05/23/21  0720 05/24/21  0123 05/25/21  0150 05/25/21  0336    137 136  --    K 4.1 4.7 4.3 3.5    104 101  --    CO2 26 24 24  --    BUN 9 9 12  --    CREATININE 0.7 0.6 0.7  --    CALCIUM 8.8 8.8 8.7  --      Recent Labs     05/23/21  0720   AST 15   ALT 6   BILITOT 0.3   ALKPHOS 107* Coag Panel:   Recent Labs     05/23/21  0720   INR 1.12   PROTIME 14.4   APTT 37.6*     Cardiac Enzymes:   Recent Labs     05/23/21 0720   TROPONINI <0.01     ABGs:  Lab Results   Component Value Date    PHART 7.390 05/25/2021    PO2ART 77.0 05/25/2021    VTX9CUD 42.0 05/25/2021     ABG:  Recent Labs     05/25/21  0336   PHART 7.390   LFE2FLR 42.0   PO2ART 77.0*   FXB6MAR 25.4   BEART 0.3   HGBAE 11.4*   C5TCYKDZ 94.1   CARBOXHGBART 1.8     CT CHEST WO CONTRAST  The interval significant decrease in the left-sided pleural effusion as detailed above. Multifocal nodular thickening of the pleura is similar to the previous study and may represent a neoplastic process. Evidence of mediastinal lymphadenopathy. Question left hilar lymphadenopathy. Proximal abdominal/celiac lymphadenopathy. Persistent nodular infiltrate in the left lower lobe superior segment. A nonobstructing right renal calculus. Signed by Dr Cash Bailey on 5/24/2021 7:35 AM    XR CHEST PORTABLE  1. Interval placement small bore left chest tube. Decreased left pleural effusion. Improved aeration of the left lung, which has persistent mild diffuse hazy opacity. Signed by Dr Tegan Tejada on 5/23/2021 2:27 PM    XR CHEST PORTABLE  1. Increased left basilar opacity and effusion compared to 5/17/2021. Similar patchy left upper lung opacities. Signed by Dr Tegan Tejada on 5/23/2021 7:20 AM    CTA PULMONARY W CONTRAST  1. Large left pleural effusion with pleural nodularity. Patchy left lung consolidative and groundglass opacities with left lower lobe atelectasis. Left hilar and mediastinal adenopathy. Overall, appearance is most concerning for malignancy. Malignancy with superimposed infection would also be a consideration. 2. Left retroperitoneal lymphadenopathy. 3. Nonobstructing right renal calculi.  Signed by Dr Tegan Tejada on 5/23/2021 9:03 AM    CXR 05/25/2021  Impression:  No significant change in trace LEFT pleural effusion and LEFT diagnoses and chronic pain/headaches. Discussed current recommendations from primary and specialty services as well as pending cytology from Pleural fluid. Patient becomes tearful stating \"I just know it's something bad\". Support/comfort offered. She further states she is unsure if she would want treatment if cytology reveals malignancy. The patient lives home alone. States her mother should be the only person listed as a healthcare surrogate at this time. Her mother lives in [place] though patient states she will remain in [place] for now to assist with patient's care and decision making. Patient would like assistance with an advanced directive. Discuss spiritual care consult and their ability to assist with that paperwork. Attempted to complete ACP note with patient, however, she remains groggy and disoriented at times and unable to fully participate/understand questions. Patient denies pain at present. Denies N/V/D or constipation. Does complain of cough and requests breathing treatment. She remained tearful intermittently throughout our conversation and is drowsy. She states she would like to speak with her mother in regards to her code status, additional healthcare surrogates and goals of care. She is unsure how to answer what her goals are during or after this hospitalization. Recommendations:     1. Palliative Care-GOC to be determined on patient's progress throughout this hospitalization. Ongoing discussion. Code status: FULL CODE. Will continue to follow, offer support/comfort. 2. Large left pleural effusion with hilar/mediastinal lymphadenopathy seen on CTA. S/p chest tube insertion with fluid sent for cytology-Pulmonology following. Cytology not yet available at time of assessment/note. If malignancy suspected consider Oncology consultation as well to assist patient in deciding her goals of care. Patient requesting breathing treatment-will order  3.  Pain 2/2 chest tube-currently no complaints of pain, however, patient states it was significant yesterday. Was receiving scheduled MS Contin and required Narcan x 2 this morning. Will stop MS Contin. Patient not taking opiates prior to this hospitalization. Add Norco with holding parameter for sedation. Will add colace daily, continue prn polyethylene glycol     Thank you for consulting palliative care and allowing us to participate in the care of the patient.     CounselingTopics: Goals of care, Code Status, Disease process education, pt/family support    Time Spent Counseling > 50%:  YES                                   Total Time Spent with patient/family counseling, workup/treatment review, counseling and placement of orders/preparation of this note: 85 minutes    Electronically signed by Jp Camacho PA-C on 5/25/2021 at 2:56 PM    (Please note that portions of this note were completed with a voice recognition program.  Efforts were made to edit the dictations but occasionally words are mis-transcribed.)

## 2021-05-25 NOTE — PROGRESS NOTES
Status:  Final result   Ref Range & Units 05/25/21 0336   pH, Arterial 7.350 - 7.450 7.390    pCO2, Arterial 35.0 - 45.0 mmHg 42.0    pO2, Arterial 80.0 - 100.0 mmHg 77. 0Low     HCO3, Arterial 22.0 - 26.0 mmol/L 25.4    Base Excess, Arterial -2.0 - 2.0 mmol/L 0.3    Hemoglobin, Art, Extended 12.0 - 16.0 g/dL 11.4Low     O2 Sat, Arterial >92 % 94.1    Carboxyhgb, Arterial 0.0 - 5.0 % 1.8    Comment:      0.0-1.5   (Smokers 1.5-5.0)    Methemoglobin, Arterial <1.5 % 1.2    O2 Content, Arterial Not Established mL/dL 15.2    O2 Therapy  Unknown    Resulting Agency  [address] [facility]        Specimen Collected: 05/25/21 0336 Last Resulted: 05/25/21 0337 View Other Order Details        3 LPM N/C   RR 19   RIGHT RADIAL AT+

## 2021-05-25 NOTE — PLAN OF CARE
Problem: Pain:  Goal: Pain level will decrease  Description: Pain level will decrease  5/25/2021 1042 by Dipak Morris RN  Outcome: Ongoing  5/25/2021 0519 by Jey Nobles RN  Outcome: Ongoing  Goal: Control of acute pain  Description: Control of acute pain  5/25/2021 1042 by Dipak Morris RN  Outcome: Ongoing  5/25/2021 0519 by Jey Nobles RN  Outcome: Ongoing  Goal: Control of chronic pain  Description: Control of chronic pain  5/25/2021 1042 by Dipak Morris RN  Outcome: Ongoing  5/25/2021 0519 by Jey Nobles RN  Outcome: Ongoing

## 2021-05-25 NOTE — PROGRESS NOTES
Pharmacy Vancomycin Consult     Vancomycin Day: 2  Current Dosing: Vancomycin 1000 mg IV every 12 hours    Temp max:  98.5    Recent Labs     05/23/21  0720 05/24/21  0123   BUN 9 9       Recent Labs     05/23/21  0720 05/24/21  0123   CREATININE 0.7 0.6       Recent Labs     05/23/21  0720 05/24/21  0123   WBC 12.0* 14.5*         Intake/Output Summary (Last 24 hours) at 5/24/2021 2221  Last data filed at 5/24/2021 1800  Gross per 24 hour   Intake 1200 ml   Output 500 ml   Net 700 ml       Culture Date Source Results   05/23/21 Blood x 2  No growth to date   05/23/21 Pleural body fluid No growth to date  Moderate WBC's   No organisms seen   05/23/21 Strep pneumoniae antigen - urine  Presumptive negative       Ht Readings from Last 1 Encounters:   05/23/21 5' 3\" (1.6 m)        Wt Readings from Last 1 Encounters:   05/24/21 173 lb 4.8 oz (78.6 kg)         Body mass index is 30.7 kg/m². Estimated Creatinine Clearance: 106 mL/min (based on SCr of 0.6 mg/dL). Trough: 14.2    Assessment/Plan: Therapeutic trough.  Continue Vancomycin 1000 mg IV every 12 hours    Electronically signed by Maximino Cooper [facility] on 5/24/2021 at 10:21 PM

## 2021-05-25 NOTE — PROGRESS NOTES
Patient has been lethargic but had been easy to wake up earlier in the night. Patient now would barley open her eyes to a sternal rub. Orders received for narcan and an abg. After narcan patient woke up and was able to tell me her name and where she was. Will continue to monitor.

## 2021-05-25 NOTE — PLAN OF CARE
Problem: Pain:  Goal: Pain level will decrease  Description: Pain level will decrease  Outcome: Ongoing  Goal: Control of acute pain  Description: Control of acute pain  Outcome: Ongoing  Goal: Control of chronic pain  Description: Control of chronic pain  Outcome: Ongoing     Problem: Falls - Risk of:  Goal: Will remain free from falls  Description: Will remain free from falls  Outcome: Ongoing  Goal: Absence of physical injury  Description: Absence of physical injury  Outcome: Ongoing     Problem:  Activity:  Goal: Fatigue will decrease  Description: Fatigue will decrease  Outcome: Ongoing     Problem: Cardiac:  Goal: Hemodynamic stability will improve  Description: Hemodynamic stability will improve  Outcome: Ongoing     Problem: Coping:  Goal: Level of anxiety will decrease  Description: Level of anxiety will decrease  Outcome: Ongoing  Goal: Ability to cope will improve  Description: Ability to cope will improve  Outcome: Ongoing  Goal: Ability to establish a method of communication will improve  Description: Ability to establish a method of communication will improve  Outcome: Ongoing     Problem: Nutritional:  Goal: Consumption of the prescribed amount of daily calories will improve  Description: Consumption of the prescribed amount of daily calories will improve  Outcome: Ongoing     Problem: Respiratory:  Goal: Ability to maintain a clear airway will improve  Description: Ability to maintain a clear airway will improve  Outcome: Ongoing  Goal: Ability to maintain adequate ventilation will improve  Description: Ability to maintain adequate ventilation will improve  Outcome: Ongoing  Goal: Complications related to the disease process, condition or treatment will be avoided or minimized  Description: Complications related to the disease process, condition or treatment will be avoided or minimized  Outcome: Ongoing     Problem: Skin Integrity:  Goal: Risk for impaired skin integrity will decrease  Description: Risk for impaired skin integrity will decrease  Outcome: Ongoing

## 2021-05-25 NOTE — FLOWSHEET NOTE
05/25/21 1733   Encounter Summary   Services provided to: Patient   Referral/Consult From: Physician   Support System Spouse; Family members   Complexity of Encounter Moderate   Length of Encounter 30 minutes   Grief and Life Adjustment   Type Adjustment to illness   Assessment Approachable; Anxious; Coping   Intervention Active listening;Explored feelings, thoughts, concerns   Outcome Expressed gratitude;Expressed feelings/needs/concerns;Coping      Received consult to assist with LW. Damon Rea was very tired. Provided LW documentation. She wants to name her son as HCS. Said she will need more help filling it out. Will follow up tomorrow.

## 2021-05-25 NOTE — PROGRESS NOTES
Pulmonary and Critical Care Progress note. [address]    MRN# [id number]    Acct# [de-identified]  5/25/2021   10:41 AM CDT    Referring Corrina English MD      Chief Complaint: pleural effusion    HPI: over the last 24 hours she continues to be minimally symptomatic, chest tube with no significant drainage since the past 48 hours. Medications  nicotine, 1 patch, Transdermal, Daily    vancomycin (VANCOCIN) intermittent dosing (placeholder), , Other, RX Placeholder    gabapentin, 600 mg, Oral, TID    rOPINIRole, 0.25 mg, Oral, Nightly    pantoprazole, 40 mg, Oral, QAM AC    amitriptyline, 100 mg, Oral, Nightly    propranolol, 10 mg, Oral, BID    topiramate, 50 mg, Oral, BID    citalopram, 40 mg, Oral, Daily    busPIRone, 10 mg, Oral, TID    atorvastatin, 40 mg, Oral, Daily    sodium chloride flush, 5-40 mL, Intravenous, 2 times per day    enoxaparin, 40 mg, Subcutaneous, Q24H    cefepime, 2,000 mg, Intravenous, Q8H    montelukast, 10 mg, Oral, Nightly    vancomycin, 1,000 mg, Intravenous, Q12H    morphine, 15 mg, Oral, Q8H     Review of Systems:  RESPIRATORY:  positive for  dry cough  CARDIOVASCULAR:  positive for  dyspnea    Physical Exam:  /66   Pulse 97   Temp 98.1 °F (36.7 °C) (Temporal)   Resp 19   Ht 5' 3\" (1.6 m)   Wt 174 lb 9.6 oz (79.2 kg)   SpO2 94%   BMI 30.93 kg/m²     Intake/Output Summary (Last 24 hours) at 5/25/2021 1041  Last data filed at 5/25/2021 1000  Gross per 24 hour   Intake 1090 ml   Output 1470 ml   Net -380 ml       General Appearance: alert and oriented to person, place and time, well-developed and well-nourished, in no acute distress  ENT: no jvd no LN. PERRL EOMI  Pulmonary/Chest: Diminished breath sounds bilaterally.   No rubs or chest wall tenderness or dullness to percussion  Cardiovascular: normal rate, normal S1 and S2, no gallops, intact distal pulses and no carotid bruits  Abdomen: soft, non-tender, non-distended, normal bowel sounds, no masses or organomegaly  Extremities: no cyanosis, no clubbing and no edema  Neurologic: Nonfocal exam    Recent Labs     05/23/21 0720 05/24/21 0123 05/25/21  0150   WBC 12.0* 14.5* 11.6*   RBC 3.78* 3.66* 3.61*   HGB 11.8* 11.2* 11.1*   HCT 36.1* 35.2* 35.3*   * 441* 475*   MCV 95.5 96.2 97.8   MCH 31.2* 30.6 30.7   MCHC 32.7* 31.8* 31.4*   RDW 12.3 12.1 12.4      Recent Labs     05/23/21 0720 05/23/21 0724 05/24/21 0123 05/25/21  0150 05/25/21  0336     --  137 136  --    K 4.1 3.6 4.7 4.3 3.5     --  104 101  --    CO2 26  --  24 24  --    BUN 9  --  9 12  --    CREATININE 0.7  --  0.6 0.7  --    CALCIUM 8.8  --  8.8 8.7  --    GLUCOSE 98  --  131* 114*  --       Recent Labs     05/23/21 0724 05/25/21  0336   PHART 7.440 7.390   IVR5ICT 35.0 42.0   PO2ART 60.0* 77.0*   EOY3DAK 23.8 25.4   D5OBUQYP 89.5* 94.1   BEART 0.0 0.3     Recent Labs     05/23/21 0720 05/23/21  0738 05/25/21  0150   AST 15  --   --    ALT 6  --   --    ALKPHOS 107*  --   --    BILITOT 0.3  --   --    CALCIUM 8.8  --  8.7   PROBNP 165  --   --    TROPONINI <0.01  --   --    LACTA  --  1.4  --    INR 1.12  --   --    DDIMER 4.78*  --   --      Recent Labs     05/23/21  0730 05/23/21  1430   BC No Growth to date. Any change in status will be called. --    LABGRAM  --  Moderate WBC's (Polymorphonuclear)  No organisms seen           Radiograph: CT CHEST WO CONTRAST    Result Date: 5/24/2021  The interval significant decrease in the left-sided pleural effusion as detailed above. Multifocal nodular thickening of the pleura is similar to the previous study and may represent a neoplastic process. Evidence of mediastinal lymphadenopathy. Question left hilar lymphadenopathy. Proximal abdominal/celiac lymphadenopathy. Persistent nodular infiltrate in the left lower lobe superior segment. A nonobstructing right renal calculus.  Signed by Dr Fede Whittington on 5/24/2021 7:35 AM    XR CHEST PORTABLE    Result Date: 5/25/2021  Impression: No significant change in trace LEFT pleural effusion and LEFT lower lobe atelectasis. Signed by Dr Annmarie Ang on 5/25/2021 11:18 AM    XR CHEST PORTABLE    Result Date: 5/23/2021  1. Interval placement small bore left chest tube. Decreased left pleural effusion. Improved aeration of the left lung, which has persistent mild diffuse hazy opacity. Signed by Dr Gaston Ann on 5/23/2021 2:27 PM    XR CHEST PORTABLE    Result Date: 5/23/2021  1. Increased left basilar opacity and effusion compared to 5/17/2021. Similar patchy left upper lung opacities. Signed by Dr Gaston Ann on 5/23/2021 7:20 AM    CTA PULMONARY W CONTRAST    Result Date: 5/23/2021  1. Large left pleural effusion with pleural nodularity. Patchy left lung consolidative and groundglass opacities with left lower lobe atelectasis. Left hilar and mediastinal adenopathy. Overall, appearance is most concerning for malignancy. Malignancy with superimposed infection would also be a consideration. 2. Left retroperitoneal lymphadenopathy. 3. Nonobstructing right renal calculi. Signed by Dr Gaston Ann on 5/23/2021 9:03 AM       My radiograph interpretation/independent review of imaging: reviewed.      Problem list generated by [employer]:  Hospital Problems         Last Modified POA    Chronic constipation 5/24/2021 Yes    Heartburn 5/24/2021 Yes    Chronic GERD 5/24/2021 Yes    Depression 5/24/2021 Yes    Chronic tension-type headache, intractable (Chronic) 5/24/2021 Yes    Cervical radiculopathy 5/24/2021 Yes    Anxiety 5/24/2021 Yes    Nausea and vomiting 5/24/2021 Yes    Esophageal dysphagia 5/24/2021 Yes    Acute respiratory failure with hypoxia ([address] 5/23/2021 Yes    Large pleural effusion 5/24/2021 Yes    S/P chest tube placement ([address] 5/24/2021 Yes    Left lower lobe pneumonia 5/24/2021 Yes    Hilar lymphadenopathy 5/24/2021 Yes    Tobacco abuse counseling 5/24/2021 Yes    Cigarette nicotine dependence with nicotine-induced disorder 5/24/2021 Yes           Pulmonary Assessment/Plan:     1. Acute hypoxic respiratory failure likely secondary to pleural effusion and underlying lung pathology. Continue oxygen as necessary to maintain adequate oxygenation. 2. Pleural effusion etiology is not clear. Seems to be an exudate. No further drainage in the past 48 hours. We will remove the chest tube. Await cytology  3. Possible community-acquired pneumonia. Continue antibiotic therapy empirically. 4. Chest wall pain likely secondary to the above supportive care.   5. DVT prophylaxis          Electronically signed by Evita Tucker MD on 05/25/21 at 10:41 AM

## 2021-05-26 ENCOUNTER — APPOINTMENT (OUTPATIENT)
Dept: CT IMAGING | Age: 56
DRG: 189 | End: 2021-05-26
Payer: MEDICAID

## 2021-05-26 PROBLEM — R29.898 WEAKNESS OF BOTH HANDS: Status: ACTIVE | Noted: 2021-05-26

## 2021-05-26 PROBLEM — R93.0 ABNORMAL CT SCAN OF HEAD: Status: ACTIVE | Noted: 2021-05-26

## 2021-05-26 LAB
ALBUMIN SERPL-MCNC: 2.1 G/DL (ref 3.5–5.2)
ALP BLD-CCNC: 94 U/L (ref 35–104)
ALT SERPL-CCNC: <5 U/L (ref 5–33)
AMMONIA: 26 UMOL/L (ref 11–51)
ANION GAP SERPL CALCULATED.3IONS-SCNC: 8 MMOL/L (ref 7–19)
AST SERPL-CCNC: 13 U/L (ref 5–32)
BILIRUB SERPL-MCNC: 0.4 MG/DL (ref 0.2–1.2)
BILIRUBIN DIRECT: 0.1 MG/DL (ref 0–0.3)
BILIRUBIN, INDIRECT: 0.3 MG/DL (ref 0.1–1)
BUN BLDV-MCNC: 9 MG/DL (ref 6–20)
CALCIUM SERPL-MCNC: 8.4 MG/DL (ref 8.6–10)
CHLORIDE BLD-SCNC: 101 MMOL/L (ref 98–111)
CO2: 27 MMOL/L (ref 22–29)
CREAT SERPL-MCNC: 0.6 MG/DL (ref 0.5–0.9)
GFR AFRICAN AMERICAN: >59
GFR NON-AFRICAN AMERICAN: >60
GLUCOSE BLD-MCNC: 94 MG/DL (ref 74–109)
HCT VFR BLD CALC: 34.3 % (ref 37–47)
HEMOGLOBIN: 10.9 G/DL (ref 12–16)
MCH RBC QN AUTO: 31.1 PG (ref 27–31)
MCHC RBC AUTO-ENTMCNC: 31.8 G/DL (ref 33–37)
MCV RBC AUTO: 97.7 FL (ref 81–99)
PDW BLD-RTO: 12.5 % (ref 11.5–14.5)
PLATELET # BLD: 430 K/UL (ref 130–400)
PMV BLD AUTO: 8.6 FL (ref 9.4–12.3)
POTASSIUM REFLEX MAGNESIUM: 3.8 MMOL/L (ref 3.5–5)
RBC # BLD: 3.51 M/UL (ref 4.2–5.4)
SODIUM BLD-SCNC: 136 MMOL/L (ref 136–145)
TOTAL PROTEIN: 6.6 G/DL (ref 6.6–8.7)
TSH REFLEX FT4: 0.9 UIU/ML (ref 0.35–5.5)
VITAMIN B-12: >2000 PG/ML (ref 211–946)
WBC # BLD: 10.7 K/UL (ref 4.8–10.8)

## 2021-05-26 PROCEDURE — 95816 EEG AWAKE AND DROWSY: CPT

## 2021-05-26 PROCEDURE — 82607 VITAMIN B-12: CPT

## 2021-05-26 PROCEDURE — 80048 BASIC METABOLIC PNL TOTAL CA: CPT

## 2021-05-26 PROCEDURE — 94640 AIRWAY INHALATION TREATMENT: CPT

## 2021-05-26 PROCEDURE — 99223 1ST HOSP IP/OBS HIGH 75: CPT | Performed by: PSYCHIATRY & NEUROLOGY

## 2021-05-26 PROCEDURE — 6370000000 HC RX 637 (ALT 250 FOR IP): Performed by: PHYSICIAN ASSISTANT

## 2021-05-26 PROCEDURE — 1210000000 HC MED SURG R&B

## 2021-05-26 PROCEDURE — 99233 SBSQ HOSP IP/OBS HIGH 50: CPT | Performed by: INTERNAL MEDICINE

## 2021-05-26 PROCEDURE — 6370000000 HC RX 637 (ALT 250 FOR IP): Performed by: HOSPITALIST

## 2021-05-26 PROCEDURE — 95816 EEG AWAKE AND DROWSY: CPT | Performed by: PSYCHIATRY & NEUROLOGY

## 2021-05-26 PROCEDURE — 2700000000 HC OXYGEN THERAPY PER DAY

## 2021-05-26 PROCEDURE — 2580000003 HC RX 258: Performed by: HOSPITALIST

## 2021-05-26 PROCEDURE — 84443 ASSAY THYROID STIM HORMONE: CPT

## 2021-05-26 PROCEDURE — 6360000002 HC RX W HCPCS: Performed by: HOSPITALIST

## 2021-05-26 PROCEDURE — 6360000002 HC RX W HCPCS: Performed by: PSYCHIATRY & NEUROLOGY

## 2021-05-26 PROCEDURE — 6370000000 HC RX 637 (ALT 250 FOR IP): Performed by: PSYCHIATRY & NEUROLOGY

## 2021-05-26 PROCEDURE — 82140 ASSAY OF AMMONIA: CPT

## 2021-05-26 PROCEDURE — 85027 COMPLETE CBC AUTOMATED: CPT

## 2021-05-26 PROCEDURE — 80076 HEPATIC FUNCTION PANEL: CPT

## 2021-05-26 PROCEDURE — 99233 SBSQ HOSP IP/OBS HIGH 50: CPT | Performed by: PHYSICIAN ASSISTANT

## 2021-05-26 PROCEDURE — 36415 COLL VENOUS BLD VENIPUNCTURE: CPT

## 2021-05-26 PROCEDURE — 70450 CT HEAD/BRAIN W/O DYE: CPT

## 2021-05-26 RX ORDER — MORPHINE SULFATE 4 MG/ML
4 INJECTION, SOLUTION INTRAMUSCULAR; INTRAVENOUS ONCE
Status: COMPLETED | OUTPATIENT
Start: 2021-05-26 | End: 2021-05-26

## 2021-05-26 RX ORDER — MORPHINE SULFATE 4 MG/ML
2 INJECTION, SOLUTION INTRAMUSCULAR; INTRAVENOUS
Status: COMPLETED | OUTPATIENT
Start: 2021-05-26 | End: 2021-05-27

## 2021-05-26 RX ORDER — GABAPENTIN 300 MG/1
300 CAPSULE ORAL 3 TIMES DAILY
Status: DISCONTINUED | OUTPATIENT
Start: 2021-05-26 | End: 2021-05-28 | Stop reason: HOSPADM

## 2021-05-26 RX ORDER — THIAMINE HYDROCHLORIDE 100 MG/ML
100 INJECTION, SOLUTION INTRAMUSCULAR; INTRAVENOUS DAILY
Status: DISCONTINUED | OUTPATIENT
Start: 2021-05-26 | End: 2021-05-28 | Stop reason: HOSPADM

## 2021-05-26 RX ADMIN — TOPIRAMATE 50 MG: 50 TABLET, FILM COATED ORAL at 20:28

## 2021-05-26 RX ADMIN — PROPRANOLOL HYDROCHLORIDE 10 MG: 10 TABLET ORAL at 08:36

## 2021-05-26 RX ADMIN — MORPHINE SULFATE 4 MG: 4 INJECTION, SOLUTION INTRAMUSCULAR; INTRAVENOUS at 16:34

## 2021-05-26 RX ADMIN — GABAPENTIN 600 MG: 300 CAPSULE ORAL at 08:36

## 2021-05-26 RX ADMIN — Medication 10 ML: at 20:29

## 2021-05-26 RX ADMIN — ROPINIROLE HYDROCHLORIDE 0.25 MG: 0.5 TABLET, FILM COATED ORAL at 20:28

## 2021-05-26 RX ADMIN — MONTELUKAST 10 MG: 10 TABLET, FILM COATED ORAL at 20:28

## 2021-05-26 RX ADMIN — ATORVASTATIN CALCIUM 40 MG: 40 TABLET, FILM COATED ORAL at 08:36

## 2021-05-26 RX ADMIN — CEFEPIME HYDROCHLORIDE 2000 MG: 2 INJECTION, POWDER, FOR SOLUTION INTRAVENOUS at 16:34

## 2021-05-26 RX ADMIN — THIAMINE HYDROCHLORIDE 100 MG: 100 INJECTION, SOLUTION INTRAMUSCULAR; INTRAVENOUS at 16:45

## 2021-05-26 RX ADMIN — IPRATROPIUM BROMIDE 0.5 MG: 0.5 SOLUTION RESPIRATORY (INHALATION) at 10:00

## 2021-05-26 RX ADMIN — ENOXAPARIN SODIUM 40 MG: 40 INJECTION SUBCUTANEOUS at 13:55

## 2021-05-26 RX ADMIN — CITALOPRAM HYDROBROMIDE 40 MG: 20 TABLET ORAL at 08:36

## 2021-05-26 RX ADMIN — VANCOMYCIN HYDROCHLORIDE 1000 MG: 10 INJECTION, POWDER, LYOPHILIZED, FOR SOLUTION INTRAVENOUS at 20:28

## 2021-05-26 RX ADMIN — DOCUSATE SODIUM 100 MG: 100 CAPSULE, LIQUID FILLED ORAL at 08:36

## 2021-05-26 RX ADMIN — BUSPIRONE HYDROCHLORIDE 10 MG: 10 TABLET ORAL at 20:28

## 2021-05-26 RX ADMIN — GABAPENTIN 300 MG: 300 CAPSULE ORAL at 20:28

## 2021-05-26 RX ADMIN — TOPIRAMATE 50 MG: 50 TABLET, FILM COATED ORAL at 08:36

## 2021-05-26 RX ADMIN — IPRATROPIUM BROMIDE 0.5 MG: 0.5 SOLUTION RESPIRATORY (INHALATION) at 14:39

## 2021-05-26 RX ADMIN — CEFEPIME HYDROCHLORIDE 2000 MG: 2 INJECTION, POWDER, FOR SOLUTION INTRAVENOUS at 08:38

## 2021-05-26 RX ADMIN — PANTOPRAZOLE SODIUM 40 MG: 40 TABLET, DELAYED RELEASE ORAL at 06:03

## 2021-05-26 RX ADMIN — BUSPIRONE HYDROCHLORIDE 10 MG: 10 TABLET ORAL at 08:36

## 2021-05-26 RX ADMIN — PROPRANOLOL HYDROCHLORIDE 10 MG: 10 TABLET ORAL at 20:28

## 2021-05-26 RX ADMIN — GABAPENTIN 600 MG: 300 CAPSULE ORAL at 13:55

## 2021-05-26 RX ADMIN — VANCOMYCIN HYDROCHLORIDE 1000 MG: 10 INJECTION, POWDER, LYOPHILIZED, FOR SOLUTION INTRAVENOUS at 08:37

## 2021-05-26 RX ADMIN — BUSPIRONE HYDROCHLORIDE 10 MG: 10 TABLET ORAL at 13:55

## 2021-05-26 RX ADMIN — IPRATROPIUM BROMIDE 0.5 MG: 0.5 SOLUTION RESPIRATORY (INHALATION) at 06:07

## 2021-05-26 RX ADMIN — Medication 10 ML: at 08:36

## 2021-05-26 ASSESSMENT — PAIN SCALES - GENERAL
PAINLEVEL_OUTOF10: 0
PAINLEVEL_OUTOF10: 9

## 2021-05-26 NOTE — PROGRESS NOTES
Pt confused this morning. Difficulty standing. Pt has dropped 5 cups of water and a cup of coffee. Weak hand . Pt also with frequent coughing. Dr. Sofy Servin aware. Orders for head CT and consult to Neurology.

## 2021-05-26 NOTE — FLOWSHEET NOTE
05/26/21 1740   Encounter Summary   Services provided to: Patient and family together  (Son, mom and sister at bedside)   Referral/Consult From: Physician   Support System Children;Family members   Complexity of Encounter Moderate   Length of Encounter 15 minutes   Grief and Life Adjustment   Type Adjustment to illness   Assessment Anxious; Coping   Intervention Active listening;Explored feelings, thoughts, concerns   Outcome Expressed gratitude     Follow up with LW. Franck Wiseman wants time to consider her decisions and return visit tomorrow. Discuss with her nurse.   Electronically signed by Analy Mann on 5/26/2021 at 5:44 PM

## 2021-05-26 NOTE — CONSULTS
[facility] Neurology Consult      Patient:   Rani Hurley  MR#:    [id number]  Account Number:                   [id number]      Room:    [address] [place], [place] [address]   YOB: 1965  Date of Progress Note: 5/26/2021  Time of Note                           2:39 PM  Attending Physician:  Galen Wiggins MD  Consulting Physician:  Cynthia Olson DO       CHIEF COMPLAINT:  AMS     HISTORY OF PRESENT ILLNESS:   This is a 48 y.o. female who was admitted with respiratory failure, pneumonia, pleura effusion, and confusion. Patient does endorse some headache. But denies focal weakness, slurred speech or facial drooping. She is confused. She has been on opioids chronically, recently stopped given confusion. CT head negative intracranially though possible lytic skull lesions noted. REVIEW OF SYSTEMS:  Limited given AMS    PAST MEDICAL HISTORY:      Diagnosis Date    Anxiety     Arthritis     Chronic tension-type headache, intractable 1/8/2016    Depression     Headache(784.0)     migraine    Lupus ([address]     Neck pain 1/8/2016       PAST SURGICAL HISTORY:      Procedure Laterality Date    COLONOSCOPY  10/23/2013    Dr Dhiraj Hall: internal hemorrhoids; hyperplastic polyps (5 yr recal for screening)    HYSTERECTOMY      NECK SURGERY  07/26/2016    Dr Lee Mas FLX DX W/COLLJ SPEC WHEN PFRMD N/A 2/6/2017    COLONOSCOPY DIAGNOSTIC OR SCREENING performed by Violet Anderson DO at [facility] OR    GA EGD TRANSORAL BIOPSY SINGLE/MULTIPLE N/A 7/26/2018    Dr Mandel-gal/dilation over wire-51 [place]-mild esophageal narrowing-Yas (-)    TUBAL LIGATION      TUMOR REMOVAL      right deltoid area    UPPER GASTROINTESTINAL ENDOSCOPY  11/06/2013    Dr Dhiraj Hall: normal    UPPER GASTROINTESTINAL ENDOSCOPY  7/26/2018    Dr Mandel-gal/dilation over wire-51 [place]-mild esophageal narrowing-Yas (-)       SOCIAL HISTORY:   TOBACCO:   reports that she has been smoking cigarettes and e-cigarettes.  She has a 16.00 pack-year smoking MD Alexandru, 50 mg at 05/26/21 0836    citalopram (CELEXA) tablet 40 mg, 40 mg, Oral, Daily, Eulogio Horvath MD, 40 mg at 05/26/21 0836    busPIRone (BUSPAR) tablet 10 mg, 10 mg, Oral, TID, Eulogio Horvath MD, 10 mg at 05/26/21 1355    atorvastatin (LIPITOR) tablet 40 mg, 40 mg, Oral, Daily, Eulogio Horvath MD, 40 mg at 05/26/21 0836    ondansetron (ZOFRAN-ODT) disintegrating tablet 4 mg, 4 mg, Oral, Q8H PRN, Eulogio Horvath MD    sodium chloride flush 0.9 % injection 5-40 mL, 5-40 mL, Intravenous, 2 times per day, Ambrose Kate MD, 10 mL at 05/26/21 0836    sodium chloride flush 0.9 % injection 10 mL, 10 mL, Intravenous, PRN, Eulogio Horvath MD    0.9 % sodium chloride infusion, 25 mL, Intravenous, PRN, Eulogio Horvath MD    enoxaparin (LOVENOX) injection 40 mg, 40 mg, Subcutaneous, Q24H, Eulogio Horvath MD, 40 mg at 05/26/21 1355    promethazine (PHENERGAN) tablet 12.5 mg, 12.5 mg, Oral, Q6H PRN **OR** ondansetron (ZOFRAN) injection 4 mg, 4 mg, Intravenous, Q6H PRN, Eulogio Horvath MD    polyethylene glycol (GLYCOLAX) packet 17 g, 17 g, Oral, Daily PRN, Eulogio Horvath MD    acetaminophen (TYLENOL) tablet 650 mg, 650 mg, Oral, Q6H PRN, 650 mg at 05/23/21 1818 **OR** acetaminophen (TYLENOL) suppository 650 mg, 650 mg, Rectal, Q6H PRN, Eulogio Horvath MD    ceFEPIme (MAXIPIME) 2,000 mg in sterile water 20 mL IV syringe, 2,000 mg, Intravenous, Q8H, Eulogio Horvath MD, 2,000 mg at 05/26/21 0838    montelukast (SINGULAIR) tablet 10 mg, 10 mg, Oral, Nightly, Eulogio Horvath MD, 10 mg at 05/25/21 2320    vancomycin (VANCOCIN) 1000 mg in dextrose 5% 250 mL IVPB, 1,000 mg, Intravenous, Q12H, Eulogio Horvath MD, Stopped at 05/26/21 0949    ALLERGIES:    Ventolin [albuterol]    PHYSICAL EXAM:    Constitutional -   BP 99/66   Pulse 102   Temp 96.3 °F (35.7 °C)   Resp 14   Ht 5' 3\" (1.6 m)   Wt 171 lb 14.4 oz (78 kg)   SpO2 96%   BMI 30.45 kg/m²   General appearance: No acute distress   EYES -   Conjunctiva normal  Pupillary exam as below, see CN exam in the neurologic exam  ENT-    No scars, masses, or lesions over external nose or ears  Oropharynx without erythema, palate midline  Cardiovascular -   No clubbing, cyanosis, or edema   Pulmonary-   Good expansion, normal effort without use of accessory muscles  Musculoskeletal -   No significant wasting of muscles noted  Gait as below, see gait exam in the neurologic exam  Muscle strength, tone, stability as below see the motor exam in the neurologic exam.   No bony deformities  Skin -   Warm, dry, and intact to inspection and palpation. No rash, erythema, or pallor  Psychiatric -   Mood, affect, and behavior appear normal    Memory as below see mental status examination in the neurologic exam      NEUROLOGICAL EXAM    Mental status   [] Awake, alert, oriented   [] Affect attention and concentration appear appropriate  [] Recent and remote memory appears unremarkable  [] Speech normal without dysarthria or aphasia, comprehension and repetition intact.    COMMENTS: Awake, speech limited, confusion noted   Cranial Nerves [x] No VF deficit to confrontation  [x] PERRLA, EOMI, no nystagmus, conjugate eye movements, no ptosis  [x] Face symmetric  [x] Facial sensation intact  [x] Tongue midline no atrophy or fasciculations present  [x] Palate midline, hearing to finger rub normal  [x] Shoulder shrug and SCM testing normal  COMMENTS:   Motor   [x] 5/5 strength x 4 extremities  [x] Normal bulk and tone  [x] No tremor present  [x] No rigidity or bradykinesia noted  COMMENTS: 4/5 globally    Sensory  [x] Sensation intact to light touch, pin prick, vibration, and proprioception BLE  [] Sensation intact to light touch, pin prick, vibration, and proprioception BUE  COMMENTS:   Coordination [x] FTN normal bilaterally   [] HTS normal bilaterally  [] DANIA normal.   COMMENTS:   Reflexes  [x] Symmetric and non-pathological  [x] Toes downgoing bilaterally  [x] No clonus present  COMMENTS:   Gait                  [] Normal steady gait    [] Ataxic    [] Spastic     [] Magnetic     [] Shuffling  [x] Not assessed  COMMENTS:       LABS/IMAGING:    As below and per HPI    CT CHEST WO CONTRAST    Result Date: 5/24/2021  Examination. CT CHEST WO CONTRAST 5/24/2021 4:40 AM History: Follow-up after chest tube placement. DLP: 881 mGycm. A CT scan of the chest is performed without intravenous contrast enhancement. The images are acquired in axial plane with subsequent reconstruction in coronal and sagittal planes. The comparison is made with the previous study dated 5/23/2021. There is interval bone graft chest tube placement. There is significant improvement in the left-sided pleural effusion. Small residual pleural effusion is seen in the left lower chest. There is also small loculated fluid in the left lower chest posteriorly including the left major fissure. There is nodular thickening of the pleura more pronounced medially and posteriorly in the left chest no significant change since the previous study. A nodular infiltrate in the left lower lobe, predominantly in the superior segment of the left lower lobe persists. The remaining lungs are well-expanded. There is a persistent mediastinal lymphadenopathy. No change. The hilar lymph nodes are not evaluated due to lack of contrast enhancement. There is moderate lobulated fullness of the left hilum which is similar to the previous study. The possibility of left hilar lymphadenopathy is not excluded. Atheromatous changes of the thoracic aorta are seen. No aneurysmal dilatation. There is moderate diffuse dilatation of the main pulmonary artery and branches. The limited visualized coronary arteries are unremarkable without significant atheromatous changes. The unenhanced liver and spleen appear unremarkable. The gallbladder is moderately distended with a layer of high density/sludge. No calculi.  There are degenerative glands are normal. A nonobstructing calculus in the right kidney is reidentified. Moderately prominent celiac lymph nodes are similar to the previous study. A focal soft tissue thickening in the left upper abdomen laterally may represent extension of the pleural thickening/processes? . The images reviewed in bone window show no acute bony abnormality or a focal bony lesion. The hardware fusion of cervical spine is incompletely visualized. The interval significant decrease in the left-sided pleural effusion as detailed above. Multifocal nodular thickening of the pleura is similar to the previous study and may represent a neoplastic process. Evidence of mediastinal lymphadenopathy. Question left hilar lymphadenopathy. Proximal abdominal/celiac lymphadenopathy. Persistent nodular infiltrate in the left lower lobe superior segment. A nonobstructing right renal calculus. Signed by Dr Sommer Lobo on 5/24/2021 7:35 AM    XR CHEST PORTABLE    Result Date: 5/23/2021  EXAM: XR CHEST PORTABLE -- 5/23/2021 1:11 PM HISTORY: 55 years, Female, status post chest tube placement COMPARISON: 5/23/2021 TECHNIQUE:  1 images. Frontal view of the chest. FINDINGS:  No pneumothorax. Interval placement left small bore chest tube. Decreased left pleural fluid. Mild diffuse hazy opacity of the left lung persists. Right lung clear. Cardiac and mediastinal silhouette within normal limits. Fixation hardware at the cervical spine. No acute bony finding. 1. Interval placement small bore left chest tube. Decreased left pleural effusion. Improved aeration of the left lung, which has persistent mild diffuse hazy opacity. Signed by Dr Darwin Donaldson on 5/23/2021 2:27 PM    XR CHEST PORTABLE    Result Date: 5/23/2021  EXAM: XR CHEST PORTABLE -- 5/23/2021 6:10 AM HISTORY: 55 years, Female, shortness of air COMPARISON: 5/17/2021 TECHNIQUE:  1 images. Frontal view of the chest. FINDINGS:  No pneumothorax. Right lung clear. Increased left basilar opacity and effusion with patchy upper lung opacities.  Cardiac silhouette obscured by pulmonary opacity. Upper mediastinum within normal limits. Lower cervical spine fixation hardware. No acute bony finding. 1. Increased left basilar opacity and effusion compared to 5/17/2021. Similar patchy left upper lung opacities. Signed by Dr Nadine Tenorio on 5/23/2021 7:20 AM    CTA PULMONARY W CONTRAST    Result Date: 5/23/2021  EXAM: CTA PULMONARY W CONTRAST -- 5/23/2021 7:18 AM HISTORY: 55 years, Female, large effusion, low oxygen saturation COMPARISON: 10/30/2018, chest radiograph 5/23/2021 DLP: 415 mGy cm. Automated exposure control was utilized to minimize patient radiation dose. TECHNIQUE: Enhanced axial CT images obtained. 3-D postprocessing, including MIPS, performed and images saved to PACS. FINDINGS: AIRWAYS/PULMONARY PARENCHYMA: The central airways are midline and patent. Emphysematous changes. Right lung clear. No right pleural effusion. Left lung with compressive atelectasis at the lung base. Patchy consolidative and groundglass opacities with left hilar adenopathy and pleural nodularity. Large left pleural effusion. VASCULATURE: Contrast bolus is adequate. There are no pulmonary arterial intraluminal filling defects. Normal pulmonary artery caliber. Thoracic aorta is normal in course and caliber. CARDIAC:  Cardiac size is normal.  No pericardial effusion. MEDIASTINUM: Left hilar, AP window and left paratracheal lymphadenopathy. For instance, left paratracheal lymph node measures up to 1.3 cm in short axis on axial series 2, image 46. Esophagus has normal coarse, caliber and wall thickness. EXTRATHORACIC: The visualized portions of the thyroid gland are unremarkable. No thoracic inlet or axillary adenopathy. The extrathoracic soft tissues are within normal limits. INCLUDED UPPER ABDOMEN: Focal fatty infiltration of the liver adjacent to the falciform ligament. Arterial phase of imaging limits evaluation.  The gallbladder, pancreas, spleen, adrenal glands appear within normal limits. Left retroperitoneal lymph node measures up to 1.2 cm in short axis on axial series 2, image 124. Nonobstructing right renal calculi. No hydronephrosis. OSSEOUS: No acute or suspicious bony finding. 1. Large left pleural effusion with pleural nodularity. Patchy left lung consolidative and groundglass opacities with left lower lobe atelectasis. Left hilar and mediastinal adenopathy. Overall, appearance is most concerning for malignancy. Malignancy with superimposed infection would also be a consideration. 2. Left retroperitoneal lymphadenopathy. 3. Nonobstructing right renal calculi. Signed by Dr Saray Persaud on 5/23/2021 9:03 AM      Recent Labs     05/24/21  0123 05/25/21  0150 05/26/21  0240   WBC 14.5* 11.6* 10.7   HGB 11.2* 11.1* 10.9*   * 475* 430*     Recent Labs     05/24/21  0123 05/25/21  0150 05/25/21  0336 05/26/21  0240    136  --  136   K 4.7 4.3 3.5 3.8    101  --  101   CO2 24 24  --  27   BUN 9 12  --  9   CREATININE 0.6 0.7  --  0.6   GLUCOSE 131* 114*  --  94       ASSESSMENT:  48 y.o. admitted with respiratory failure, pneumonia, large pleural effusion with chest tube placement, confusion, abnormal CT head. PLAN:  1. MRI brain  2. EEG  3. Oncology consult given possible lytic skull lesions. 4.  Additional labs  5. Limit sedating medications, will stop Elavil, decrease neurotin to 300 mg tid, may consider reducing topamax as well if does not improve. 6.  Thiamine  7. Continue addressing medical issues, pneumonia, pleural effusion, respiratory failure - chest tube removed. Please feel free to call with any questions. [phone] (cell phone).     Candy Barrow DO  Board Certified Neurology

## 2021-05-26 NOTE — PLAN OF CARE
Problem: Pain:  Description: Pain management should include both nonpharmacologic and pharmacologic interventions. Goal: Pain level will decrease  Description: Pain level will decrease  Outcome: Ongoing  Goal: Control of acute pain  Description: Control of acute pain  Outcome: Ongoing  Goal: Control of chronic pain  Description: Control of chronic pain  Outcome: Ongoing     Problem: Falls - Risk of:  Goal: Will remain free from falls  Description: Will remain free from falls  Outcome: Ongoing  Goal: Absence of physical injury  Description: Absence of physical injury  Outcome: Ongoing     Problem:  Activity:  Goal: Fatigue will decrease  Description: Fatigue will decrease  Outcome: Ongoing  Goal: Amount of time patient spends in regular exercise will increase  Description: Amount of time patient spends in regular exercise will increase  Outcome: Ongoing     Problem: Cardiac:  Goal: Hemodynamic stability will improve  Description: Hemodynamic stability will improve  Outcome: Ongoing     Problem: Coping:  Goal: Level of anxiety will decrease  Description: Level of anxiety will decrease  Outcome: Ongoing  Goal: Ability to cope will improve  Description: Ability to cope will improve  Outcome: Ongoing  Goal: Ability to establish a method of communication will improve  Description: Ability to establish a method of communication will improve  Outcome: Ongoing  Goal: Ability to identify and develop effective coping behavior will improve  Description: Ability to identify and develop effective coping behavior will improve  Outcome: Ongoing  Goal: Ability to identify strategies to decrease anxiety will improve  Description: Ability to identify strategies to decrease anxiety will improve  Outcome: Ongoing     Problem: Nutritional:  Goal: Consumption of the prescribed amount of daily calories will improve  Description: Consumption of the prescribed amount of daily calories will improve  Outcome: Ongoing  Goal: Adjustment of eating patterns to appropriate times will improve  Description: Adjustment of eating patterns to appropriate times will improve  Outcome: Ongoing  Goal: Ability to make healthy dietary choices will improve  Description: Ability to make healthy dietary choices will improve  Outcome: Ongoing  Goal: Progress toward achieving an optimal weight will improve  Description: Progress toward achieving an optimal weight will improve  Outcome: Ongoing  Goal: Ability to chew and swallow food without choking will improve  Description: Ability to chew and swallow food without choking will improve  Outcome: Ongoing  Goal: Ability to achieve adequate nutritional intake will improve  Description: Ability to achieve adequate nutritional intake will improve  Outcome: Ongoing  Goal: Ability to maintain an optimal weight for height and age will improve  Description: Ability to maintain an optimal weight for height and age will improve  Outcome: Ongoing     Problem: Respiratory:  Goal: Ability to maintain a clear airway will improve  Description: Ability to maintain a clear airway will improve  Outcome: Ongoing  Goal: Ability to maintain adequate ventilation will improve  Description: Ability to maintain adequate ventilation will improve  Outcome: Ongoing  Goal: Complications related to the disease process, condition or treatment will be avoided or minimized  Description: Complications related to the disease process, condition or treatment will be avoided or minimized  Outcome: Ongoing     Problem: Skin Integrity:  Goal: Risk for impaired skin integrity will decrease  Description: Risk for impaired skin integrity will decrease  Outcome: Ongoing     Problem: Self-Concept:  Goal: Ability to verbalize positive feelings about self will improve  Description: Ability to verbalize positive feelings about self will improve  Outcome: Ongoing     Problem: Bleeding:  Goal: Will show no signs and symptoms of excessive bleeding  Description: Will show no signs and symptoms of excessive bleeding  Outcome: Ongoing     Problem: SAFETY  Goal: Free from accidental physical injury  Outcome: Ongoing  Goal: Free from intentional harm  Outcome: Ongoing     Problem: DAILY CARE  Goal: Daily care needs are met  Outcome: Ongoing     Problem: PAIN  Goal: Patient's pain/discomfort is manageable  Outcome: Ongoing     Problem: SKIN INTEGRITY  Goal: Skin integrity is maintained or improved  Outcome: Ongoing     Problem: KNOWLEDGE DEFICIT  Goal: Patient/S.O. demonstrates understanding of disease process, treatment plan, medications, and discharge instructions.   Outcome: Ongoing     Problem: DISCHARGE BARRIERS  Goal: Patient's continuum of care needs are met  Outcome: Ongoing     Problem: ABCDS Injury Assessment  Goal: Absence of physical injury  Outcome: Ongoing     Problem: Discharge Planning:  Goal: Discharged to appropriate level of care  Description: Discharged to appropriate level of care  Outcome: Ongoing  Goal: Participates in care planning  Description: Participates in care planning  Outcome: Ongoing     Problem: Airway Clearance - Ineffective:  Goal: Clear lung sounds  Description: Clear lung sounds  Outcome: Ongoing     Problem: Fluid Volume - Deficit:  Goal: Achieves intake and output within specified parameters  Description: Achieves intake and output within specified parameters  Outcome: Ongoing     Problem: Gas Exchange - Impaired:  Goal: Levels of oxygenation will improve  Description: Levels of oxygenation will improve  Outcome: Ongoing     Problem: Hyperthermia:  Goal: Ability to maintain a body temperature in the normal range will improve  Description: Ability to maintain a body temperature in the normal range will improve  Outcome: Ongoing     Problem: Tobacco Use:  Goal: Will participate in inpatient tobacco-use cessation counseling  Description: Will participate in inpatient tobacco-use cessation counseling  Outcome: Ongoing     Problem: Tobacco Use:  Goal:

## 2021-05-26 NOTE — PROGRESS NOTES
Pulmonary and Critical Care Progress note. [address]    MRN# [id number]    Acct# [de-identified]  5/26/2021   10:41 AM CDT    Referring Khang Duran MD      Chief Complaint: pleural effusion    HPI: over the last 24 hours she continues to be minimally symptomatic, chest tube was removed. Complains of feeling off balance. Medications    gabapentin, 300 mg, Oral, TID    thiamine, 100 mg, Intravenous, Daily    morphine, 4 mg, Intravenous, Once    ipratropium, 0.5 mg, Nebulization, 4x daily    docusate sodium, 100 mg, Oral, Daily    nicotine, 1 patch, Transdermal, Daily    vancomycin (VANCOCIN) intermittent dosing (placeholder), , Other, RX Placeholder    rOPINIRole, 0.25 mg, Oral, Nightly    pantoprazole, 40 mg, Oral, QAM AC    propranolol, 10 mg, Oral, BID    topiramate, 50 mg, Oral, BID    citalopram, 40 mg, Oral, Daily    busPIRone, 10 mg, Oral, TID    atorvastatin, 40 mg, Oral, Daily    sodium chloride flush, 5-40 mL, Intravenous, 2 times per day    enoxaparin, 40 mg, Subcutaneous, Q24H    cefepime, 2,000 mg, Intravenous, Q8H    montelukast, 10 mg, Oral, Nightly    vancomycin, 1,000 mg, Intravenous, Q12H     Review of Systems:  RESPIRATORY:  positive for  dry cough  CARDIOVASCULAR:  positive for  dyspnea    Physical Exam:  /69   Pulse 114   Temp 98 °F (36.7 °C) (Temporal)   Resp 21   Ht 5' 3\" (1.6 m)   Wt 171 lb 14.4 oz (78 kg)   SpO2 96%   BMI 30.45 kg/m²     Intake/Output Summary (Last 24 hours) at 5/26/2021 1614  Last data filed at 5/26/2021 8979  Gross per 24 hour   Intake 889.17 ml   Output --   Net 889.17 ml       General Appearance: alert and oriented to person, place and time, well-developed and well-nourished, in no acute distress  ENT: no jvd no LN. PERRL EOMI  Pulmonary/Chest: Diminished breath sounds bilaterally.   No rubs or chest wall tenderness or dullness to percussion  Cardiovascular: normal rate, normal S1 and S2, no gallops, intact distal pulses and no carotid bruits  Abdomen: soft, non-tender, non-distended, normal bowel sounds, no masses or organomegaly  Extremities: no cyanosis, no clubbing and no edema  Neurologic: Nonfocal exam    Recent Labs     05/24/21  0123 05/25/21  0150 05/26/21  0240   WBC 14.5* 11.6* 10.7   RBC 3.66* 3.61* 3.51*   HGB 11.2* 11.1* 10.9*   HCT 35.2* 35.3* 34.3*   * 475* 430*   MCV 96.2 97.8 97.7   MCH 30.6 30.7 31.1*   MCHC 31.8* 31.4* 31.8*   RDW 12.1 12.4 12.5      Recent Labs     05/24/21  0123 05/25/21  0150 05/25/21  0336 05/26/21  0240    136  --  136   K 4.7 4.3 3.5 3.8    101  --  101   CO2 24 24  --  27   BUN 9 12  --  9   CREATININE 0.6 0.7  --  0.6   CALCIUM 8.8 8.7  --  8.4*   GLUCOSE 131* 114*  --  94      Recent Labs     05/25/21  0336   PHART 7.390   HBH0YVP 42.0   PO2ART 77.0*   GFO5MST 25.4   I9DJTQHL 94.1   BEART 0.3     Recent Labs     05/26/21  0240   CALCIUM 8.4*     No results for input(s): BC, LABGRAM, CULTRESP, BFCX in the last 72 hours. Radiograph: CT CHEST WO CONTRAST    Result Date: 5/24/2021  The interval significant decrease in the left-sided pleural effusion as detailed above. Multifocal nodular thickening of the pleura is similar to the previous study and may represent a neoplastic process. Evidence of mediastinal lymphadenopathy. Question left hilar lymphadenopathy. Proximal abdominal/celiac lymphadenopathy. Persistent nodular infiltrate in the left lower lobe superior segment. A nonobstructing right renal calculus. Signed by Dr Jewels Lee on 5/24/2021 7:35 AM    XR CHEST PORTABLE    Result Date: 5/25/2021  Impression: No significant change in trace LEFT pleural effusion and LEFT lower lobe atelectasis. Signed by Dr Ethel Barrientos on 5/25/2021 11:18 AM    XR CHEST PORTABLE    Result Date: 5/23/2021  1. Interval placement small bore left chest tube. Decreased left pleural effusion.  Improved aeration of the left lung, which has persistent mild diffuse hazy opacity. Signed by Dr Zoraida Gonzales on 5/23/2021 2:27 PM    XR CHEST PORTABLE    Result Date: 5/23/2021  1. Increased left basilar opacity and effusion compared to 5/17/2021. Similar patchy left upper lung opacities. Signed by Dr Zoraida Gonzales on 5/23/2021 7:20 AM    CTA PULMONARY W CONTRAST    Result Date: 5/23/2021  1. Large left pleural effusion with pleural nodularity. Patchy left lung consolidative and groundglass opacities with left lower lobe atelectasis. Left hilar and mediastinal adenopathy. Overall, appearance is most concerning for malignancy. Malignancy with superimposed infection would also be a consideration. 2. Left retroperitoneal lymphadenopathy. 3. Nonobstructing right renal calculi. Signed by Dr Zoraiad Gonzales on 5/23/2021 9:03 AM       My radiograph interpretation/independent review of imaging: reviewed. Problem list generated by [facility] Problems         Last Modified POA    * (Principal) Acute respiratory failure with hypoxia ([address] 5/25/2021 Yes    Chronic constipation 5/24/2021 Yes    Heartburn 5/24/2021 Yes    Chronic GERD 5/24/2021 Yes    Depression 5/24/2021 Yes    Chronic tension-type headache, intractable (Chronic) 5/24/2021 Yes    Cervical radiculopathy 5/24/2021 Yes    Anxiety 5/24/2021 Yes    Nausea and vomiting 5/24/2021 Yes    Esophageal dysphagia 5/24/2021 Yes    Large pleural effusion 5/24/2021 Yes    S/P chest tube placement ([address] 5/24/2021 Yes    Left lower lobe pneumonia 5/24/2021 Yes    Hilar lymphadenopathy 5/24/2021 Yes    Tobacco abuse counseling 5/24/2021 Yes    Cigarette nicotine dependence with nicotine-induced disorder 5/24/2021 Yes    Palliative care patient 5/25/2021 Yes           Pulmonary Assessment/Plan:     1. Acute hypoxic respiratory failure likely secondary to pleural effusion and underlying lung pathology. Continue oxygen as necessary to maintain adequate oxygenation. 2. Pleural effusion etiology is not clear. Seems to be an exudate. Await cytology  3. Possible community-acquired pneumonia. Continue antibiotic therapy empirically. 4. Feeling of dysequilibrium? Etiology consider neuro eval.   5. Chest wall pain likely secondary to the above supportive care.   6. DVT prophylaxis          Electronically signed by Ed Krishna MD on 05/26/21 at 4:14 PM

## 2021-05-26 NOTE — PROGRESS NOTES
Pt crying out in severe pain stating \"her heart hurts so bad\" every time she coughs. Her family is at bedside asking us to [facility] do something. \" VS were taken and recorded in EPIC. Dr. Jaskaran Carlos was updated on the pt's condition and situation. Pain medications were ordered (see MAR).

## 2021-05-26 NOTE — PROGRESS NOTES
Palliative Care Progress Note  5/26/2021 3:52 PM    Patient:  Howard Mccall  YOB: 1965  Primary Care Physician: JERILYN Wood  Advance Directive: DNR-CC  Admit Date: 5/23/2021       Hospital Day: 3  Portions of this note have been copied forward, however, changed to reflect the most current clinical status of this patient. CHIEF COMPLAINT/REASON FOR CONSULTATION Goals of care, code status, family support    SUBJECTIVE:  Ms. Leticia Rivas complains of feeling groggy and confused this morning. She is unable to hold a glass of water and states this is 2/2 a pinched nerve in her neck. Her mother confirmed over the phone this is a chronic issue. Upon speaking with patient's RN, Ms. Leticia Rivas has had worsening confusion today. Neurology has been consulted with CT head ordered. Patient remains unable to fully discuss goals of care. Her mother was named as her healthcare surrogate who is on her way to the hospital per patient. Review of Systems:   14 point review of systems is negative except as specifically addressed above. Objective:   VITALS:  BP 99/66   Pulse 102   Temp 96.3 °F (35.7 °C)   Resp 14   Ht 5' 3\" (1.6 m)   Wt 171 lb 14.4 oz (78 kg)   SpO2 96%   BMI 30.45 kg/m²   24HR INTAKE/OUTPUT:      Intake/Output Summary (Last 24 hours) at 5/26/2021 1552  Last data filed at 5/26/2021 5087  Gross per 24 hour   Intake 1009.17 ml   Output 150 ml   Net 859.17 ml       General appearance: 47 yo female, appears lethargic, no acute distress, sitting on side of bed  Head: Normocephalic, without obvious abnormality, atraumatic  Eyes: conjunctivae/corneas clear. PERRL, EOM's intact. Ears: normal external ears and nose, throat without exudate  Neck: no adenopathy, no carotid bruit, no JVD, supple, symmetrical, trachea midline   Lungs: diminished air entry throughout worse LLL, no wheezing, rales or rhonchi noted.  Chest tube removed with serosanguinous drainage noted on dressing   Heart: regular rate 12  --  9   CREATININE 0.6 0.7  --  0.6   GLUCOSE 131* 114*  --  94     ABG:  Recent Labs     05/25/21  0336   PHART 7.390   TJW0AUZ 42.0   PO2ART 77.0*   RET9GXQ 25.4   BEART 0.3   HGBAE 11.4*   O3ILDCHW 94.1   CARBOXHGBART 1.8     Assessment/Plan   Principal Problem:    Acute respiratory failure with hypoxia (HCC)  Active Problems:    Chronic constipation    Heartburn    Chronic GERD    Depression    Chronic tension-type headache, intractable    Cervical radiculopathy    Anxiety    Nausea and vomiting    Esophageal dysphagia    Large pleural effusion    S/P chest tube placement (HCC)    Left lower lobe pneumonia    Hilar lymphadenopathy    Tobacco abuse counseling    Cigarette nicotine dependence with nicotine-induced disorder    Palliative care patient  Resolved Problems:    * No resolved hospital problems. *    Visit Summary:  Ms. Smiley Brown was transferred to medical floor overnight. No acute overnight events were reported, however, patient with confusion this morning and noted to drop anything she tries to . She and her mother both report this is chronic, however, patient's speech is slurred today, she cannot recall the year and is very slow to participate in conversation or follow commands. She does ask about results from pleural fluid cytology which are still pending at this time. She remains unable to answer questions regarding goals of care. She states she cannot answer my questions until she knows what pathology report shows. Discussed code status as well and patient states she cannot discuss this further until her mother is here. Patient's mother, Damari Santillan, named primary healthcare surrogate was called who states she will be here soon. Susan updated regarding confusion, head Ct and Neurology consultation. Support/comfort offered to both patient and her mother. Recommendations:   1. Palliative Care-GOC to be determined on patient's progress throughout this hospitalization. Awaiting pathology report.  Code

## 2021-05-26 NOTE — PROGRESS NOTES
work-up. This was negative for PE, unfortunately, it appears as though that she may have metastatic lung carcinoma with a very large left-sided pleural effusion. I did notify her of these findings. We will get pulmonology and oncology on board. I feel that she will most likely also need a left-sided chest tube for drainage. She is obviously distraught at the news.       REVIEW  OF  SYSTEMS:    Constitutional:  No fevers, chills, nausea, vomiting, + tiredness and fatigue   Lungs:   No hemoptysis, pleurisy, cough, +SOB, + left-sided chest tube in place   Heart:  No chest pressure with exertion, palpitations,    Abdomen:   No new masses, no bright red blood per rectum   Extremities:  + difficulty ambulation due to weakness, no new lesions   Neurologic: No new motor or sensory changes       PAST MEDICAL HISTORY:  Past Medical History:   Diagnosis Date    Anxiety     Arthritis     Chronic tension-type headache, intractable 1/8/2016    Depression     Headache(784.0)     migraine    Lupus ([address]     Neck pain 1/8/2016         PAST SURGICAL HISTORY:  Past Surgical History:   Procedure Laterality Date    COLONOSCOPY  10/23/2013    Dr Ajay Salgado: internal hemorrhoids; hyperplastic polyps (5 yr recal for screening)    HYSTERECTOMY      NECK SURGERY  07/26/2016    Dr Huseyin Hobbs FLX DX W/COLLJ SPEC WHEN PFRMD N/A 2/6/2017    COLONOSCOPY DIAGNOSTIC OR SCREENING performed by Dieudonne Gamboa DO at [address] [facility] OR    NV EGD TRANSORAL BIOPSY SINGLE/MULTIPLE N/A 7/26/2018    Dr Mandel-gal/dilation over wire-51 [place]-mild esophageal narrowing-Yas (-)    TUBAL LIGATION      TUMOR REMOVAL      right deltoid area    UPPER GASTROINTESTINAL ENDOSCOPY  11/06/2013    Dr Ajay Salgado: normal    UPPER GASTROINTESTINAL ENDOSCOPY  7/26/2018    Dr Mandel-w/dilation over wire-51 [place]-mild esophageal narrowing-Yas (-)        FAMILY HISTORY:  Family History   Problem Relation Age of Onset    Colon Cancer Maternal Aunt     Colon Polyps Maternal Aunt     Liver Cancer Neg Hx     Liver Disease Neg Hx     Rectal Cancer Neg Hx     Stomach Cancer Neg Hx            OBJECTIVE:  /70   Pulse 111   Temp 98.1 °F (36.7 °C) (Temporal)   Resp 16   Ht 5' 3\" (1.6 m)   Wt 174 lb 9.6 oz (79.2 kg)   SpO2 96%   BMI 30.93 kg/m²   I/O this shift:  In: 120 [P.O.:120]  Out: 150 [Urine:150]    PHYSICAL  EXAMINATION:    SEN:  Awake, alert, oriented x 3, patient appears tired and fatigued   Head/Eyes:  Normocephalic, atraumatic, EOMI and PERRLA bilaterally   Respiratory:   Bilateral decreased air entry in both lung fields, mild B/L crackles, + left-sided chest tube in place   Cardiovascular:  Regular rate and rhythm, S1+S2+0, no murmurs/rubs   Abdomen:   Soft, non-tender, bowel sounds +ve, no organomegaly   Extremities: Moves all, decreased range of motion, no edema   Neurologic: Awake, alert, oriented x 3, cranial nerves II-XII intact, no focal neurological deficits, sensory system intact   Psychiatric: Normal mood, non-suicidal       CURRENT MEDICATIONS:  Scheduled:   ipratropium  0.5 mg Nebulization 4x daily    docusate sodium  100 mg Oral Daily    nicotine  1 patch Transdermal Daily    vancomycin (VANCOCIN) intermittent dosing (placeholder)   Other RX Placeholder    gabapentin  600 mg Oral TID    rOPINIRole  0.25 mg Oral Nightly    pantoprazole  40 mg Oral QAM AC    amitriptyline  100 mg Oral Nightly    propranolol  10 mg Oral BID    topiramate  50 mg Oral BID    citalopram  40 mg Oral Daily    busPIRone  10 mg Oral TID    atorvastatin  40 mg Oral Daily    sodium chloride flush  5-40 mL Intravenous 2 times per day    enoxaparin  40 mg Subcutaneous Q24H    cefepime  2,000 mg Intravenous Q8H    montelukast  10 mg Oral Nightly    vancomycin  1,000 mg Intravenous Q12H        PRN:  naloxone, 0.4 mg, PRN  HYDROcodone 5 mg - acetaminophen, 1 tablet, Q4H PRN   Or  HYDROcodone 5 mg - acetaminophen, 2 tablet, Q4H PRN  clonazePAM, 0.5 mg, BID PRN  ondansetron, 4 mg, Q8H PRN  sodium chloride flush, 10 mL, PRN  sodium chloride, 25 mL, PRN  promethazine, 12.5 mg, Q6H PRN   Or  ondansetron, 4 mg, Q6H PRN  polyethylene glycol, 17 g, Daily PRN  acetaminophen, 650 mg, Q6H PRN   Or  acetaminophen, 650 mg, Q6H PRN  morphine, 2 mg, Q4H PRN        Infusions:   sodium chloride         Laboratory Data:  Recent Labs     05/23/21 0720 05/24/21 0123 05/25/21 0150   WBC 12.0* 14.5* 11.6*   HGB 11.8* 11.2* 11.1*   * 441* 475*     Recent Labs     05/23/21 0720 05/24/21 0123 05/25/21 0150 05/25/21 0336    137 136  --    K 4.1 4.7 4.3 3.5    104 101  --    CO2 26 24 24  --    BUN 9 9 12  --    CREATININE 0.7 0.6 0.7  --    GLUCOSE 98 131* 114*  --      Recent Labs     05/23/21 0720   AST 15   ALT 6   BILITOT 0.3   ALKPHOS 107*     Troponin T:   Recent Labs     05/23/21 0720   TROPONINI <0.01     Pro-BNP: No results for input(s): BNP in the last 72 hours. INR:   Recent Labs     05/23/21 0720   INR 1.12     UA:No results for input(s): NITRITE, COLORU, PHUR, LABCAST, WBCUA, RBCUA, MUCUS, TRICHOMONAS, YEAST, BACTERIA, CLARITYU, SPECGRAV, LEUKOCYTESUR, UROBILINOGEN, BILIRUBINUR, BLOODU, GLUCOSEU, AMORPHOUS in the last 72 hours. Invalid input(s): Bassam Velazquez  A1C: No results for input(s): LABA1C in the last 72 hours. ABG:  Recent Labs     05/25/21 0336   PHART 7.390   IOQ0LYL 42.0   PO2ART 77.0*   JHF1IIS 25.4   BEART 0.3   HGBAE 11.4*   U4IBSXLQ 94.1   CARBOXHGBART 1.8         Imaging:    CT CHEST WO CONTRAST    Result Date: 5/24/2021  The interval significant decrease in the left-sided pleural effusion as detailed above. Multifocal nodular thickening of the pleura is similar to the previous study and may represent a neoplastic process. Evidence of mediastinal lymphadenopathy. Question left hilar lymphadenopathy. Proximal abdominal/celiac lymphadenopathy. Persistent nodular infiltrate in the left lower lobe superior segment.  A nonobstructing right renal calculus. Signed by Dr Maribel Sow on 5/24/2021 7:35 AM    XR CHEST PORTABLE    Result Date: 5/23/2021  1. Interval placement small bore left chest tube. Decreased left pleural effusion. Improved aeration of the left lung, which has persistent mild diffuse hazy opacity. Signed by Dr Gaston Ann on 5/23/2021 2:27 PM    XR CHEST PORTABLE    Result Date: 5/23/2021  1. Increased left basilar opacity and effusion compared to 5/17/2021. Similar patchy left upper lung opacities. Signed by Dr Gaston Ann on 5/23/2021 7:20 AM    CTA PULMONARY W CONTRAST    Result Date: 5/23/2021  1. Large left pleural effusion with pleural nodularity. Patchy left lung consolidative and groundglass opacities with left lower lobe atelectasis. Left hilar and mediastinal adenopathy. Overall, appearance is most concerning for malignancy. Malignancy with superimposed infection would also be a consideration. 2. Left retroperitoneal lymphadenopathy. 3. Nonobstructing right renal calculi. Signed by Dr Gaston Ann on 5/23/2021 9:03 AM       ASSESSMENT & PLAN:    Principal Problem:    Acute respiratory failure with hypoxia (HCC)  Active Problems:    Chronic constipation    Heartburn    Chronic GERD    Depression    Chronic tension-type headache, intractable    Cervical radiculopathy    Anxiety    Nausea and vomiting    Esophageal dysphagia    Large pleural effusion    S/P chest tube placement (HCC)    Left lower lobe pneumonia    Hilar lymphadenopathy    Tobacco abuse counseling    Cigarette nicotine dependence with nicotine-induced disorder    Palliative care patient  Resolved Problems:    * No resolved hospital problems.  *      Continue with current medication  Continue with IV antibiotics in the form of cefepime and vancomycin  Monitor cultures  Drainage stopped from the left-sided chest tube hence it was discontinued  Pulmonary recommendations reviewed, appreciated and agreed with  Patient's leukocytosis is slowly This includes review of EMR data, imaging, bedside evaluation, patient/family counseling and coordination of care with nursing, providers and consultants. Eulogio Horvath MD  5/25/2021 7:23 PM      DISCLAIMER: This note was created with electronic voice recognition which does have occasional errors. If you have any questions regarding the content within the note please do not hesitate to contact me. .. Thanks.

## 2021-05-27 ENCOUNTER — APPOINTMENT (OUTPATIENT)
Dept: MRI IMAGING | Age: 56
DRG: 189 | End: 2021-05-27
Payer: MEDICAID

## 2021-05-27 ENCOUNTER — APPOINTMENT (OUTPATIENT)
Dept: CT IMAGING | Age: 56
DRG: 189 | End: 2021-05-27
Payer: MEDICAID

## 2021-05-27 ENCOUNTER — APPOINTMENT (OUTPATIENT)
Dept: GENERAL RADIOLOGY | Age: 56
DRG: 189 | End: 2021-05-27
Payer: MEDICAID

## 2021-05-27 LAB
ANAEROBIC CULTURE: NORMAL
ANION GAP SERPL CALCULATED.3IONS-SCNC: 13 MMOL/L (ref 7–19)
APTT: 36.8 SEC (ref 26–36.2)
BODY FLUID CULTURE, STERILE: NORMAL
BUN BLDV-MCNC: 9 MG/DL (ref 6–20)
CALCIUM SERPL-MCNC: 7.9 MG/DL (ref 8.6–10)
CHLORIDE BLD-SCNC: 99 MMOL/L (ref 98–111)
CO2: 23 MMOL/L (ref 22–29)
CREAT SERPL-MCNC: 0.5 MG/DL (ref 0.5–0.9)
GFR AFRICAN AMERICAN: >59
GFR NON-AFRICAN AMERICAN: >60
GLUCOSE BLD-MCNC: 96 MG/DL (ref 74–109)
GRAM STAIN RESULT: NORMAL
HCT VFR BLD CALC: 32 % (ref 37–47)
HEMOGLOBIN: 10.1 G/DL (ref 12–16)
INR BLD: 1.24 (ref 0.88–1.18)
MCH RBC QN AUTO: 30.8 PG (ref 27–31)
MCHC RBC AUTO-ENTMCNC: 31.6 G/DL (ref 33–37)
MCV RBC AUTO: 97.6 FL (ref 81–99)
PDW BLD-RTO: 12.3 % (ref 11.5–14.5)
PLATELET # BLD: 471 K/UL (ref 130–400)
PMV BLD AUTO: 8.7 FL (ref 9.4–12.3)
POTASSIUM REFLEX MAGNESIUM: 3.6 MMOL/L (ref 3.5–5)
PROTHROMBIN TIME: 15.6 SEC (ref 12–14.6)
RBC # BLD: 3.28 M/UL (ref 4.2–5.4)
SODIUM BLD-SCNC: 135 MMOL/L (ref 136–145)
WBC # BLD: 11.8 K/UL (ref 4.8–10.8)

## 2021-05-27 PROCEDURE — 6370000000 HC RX 637 (ALT 250 FOR IP): Performed by: HOSPITALIST

## 2021-05-27 PROCEDURE — 2580000003 HC RX 258: Performed by: HOSPITALIST

## 2021-05-27 PROCEDURE — 88329 PATH CONSLTJ DRG SURG: CPT

## 2021-05-27 PROCEDURE — 99233 SBSQ HOSP IP/OBS HIGH 50: CPT | Performed by: PHYSICIAN ASSISTANT

## 2021-05-27 PROCEDURE — 86334 IMMUNOFIX E-PHORESIS SERUM: CPT

## 2021-05-27 PROCEDURE — 99233 SBSQ HOSP IP/OBS HIGH 50: CPT | Performed by: PSYCHIATRY & NEUROLOGY

## 2021-05-27 PROCEDURE — 6360000002 HC RX W HCPCS: Performed by: PSYCHIATRY & NEUROLOGY

## 2021-05-27 PROCEDURE — 6370000000 HC RX 637 (ALT 250 FOR IP): Performed by: PSYCHIATRY & NEUROLOGY

## 2021-05-27 PROCEDURE — 85610 PROTHROMBIN TIME: CPT

## 2021-05-27 PROCEDURE — 88305 TISSUE EXAM BY PATHOLOGIST: CPT

## 2021-05-27 PROCEDURE — C1713 ANCHOR/SCREW BN/BN,TIS/BN: HCPCS

## 2021-05-27 PROCEDURE — 83883 ASSAY NEPHELOMETRY NOT SPEC: CPT

## 2021-05-27 PROCEDURE — 99233 SBSQ HOSP IP/OBS HIGH 50: CPT | Performed by: INTERNAL MEDICINE

## 2021-05-27 PROCEDURE — A9577 INJ MULTIHANCE: HCPCS | Performed by: PSYCHIATRY & NEUROLOGY

## 2021-05-27 PROCEDURE — 94640 AIRWAY INHALATION TREATMENT: CPT

## 2021-05-27 PROCEDURE — 99223 1ST HOSP IP/OBS HIGH 75: CPT | Performed by: INTERNAL MEDICINE

## 2021-05-27 PROCEDURE — 80048 BASIC METABOLIC PNL TOTAL CA: CPT

## 2021-05-27 PROCEDURE — 36415 COLL VENOUS BLD VENIPUNCTURE: CPT

## 2021-05-27 PROCEDURE — 84155 ASSAY OF PROTEIN SERUM: CPT

## 2021-05-27 PROCEDURE — 88313 SPECIAL STAINS GROUP 2: CPT

## 2021-05-27 PROCEDURE — 82784 ASSAY IGA/IGD/IGG/IGM EACH: CPT

## 2021-05-27 PROCEDURE — 6370000000 HC RX 637 (ALT 250 FOR IP): Performed by: PHYSICIAN ASSISTANT

## 2021-05-27 PROCEDURE — 2700000000 HC OXYGEN THERAPY PER DAY

## 2021-05-27 PROCEDURE — 85730 THROMBOPLASTIN TIME PARTIAL: CPT

## 2021-05-27 PROCEDURE — 0W9B30Z DRAINAGE OF LEFT PLEURAL CAVITY WITH DRAINAGE DEVICE, PERCUTANEOUS APPROACH: ICD-10-PCS | Performed by: INTERNAL MEDICINE

## 2021-05-27 PROCEDURE — 84165 PROTEIN E-PHORESIS SERUM: CPT

## 2021-05-27 PROCEDURE — 6360000002 HC RX W HCPCS: Performed by: HOSPITALIST

## 2021-05-27 PROCEDURE — 88334 PATH CONSLTJ SURG CYTO XM EA: CPT

## 2021-05-27 PROCEDURE — 92523 SPEECH SOUND LANG COMPREHEN: CPT

## 2021-05-27 PROCEDURE — 6360000004 HC RX CONTRAST MEDICATION: Performed by: PSYCHIATRY & NEUROLOGY

## 2021-05-27 PROCEDURE — 85027 COMPLETE CBC AUTOMATED: CPT

## 2021-05-27 PROCEDURE — 88341 IMHCHEM/IMCYTCHM EA ADD ANTB: CPT

## 2021-05-27 PROCEDURE — 77075 RADEX OSSEOUS SURVEY COMPL: CPT

## 2021-05-27 PROCEDURE — 70553 MRI BRAIN STEM W/O & W/DYE: CPT

## 2021-05-27 PROCEDURE — 88342 IMHCHEM/IMCYTCHM 1ST ANTB: CPT

## 2021-05-27 PROCEDURE — 1210000000 HC MED SURG R&B

## 2021-05-27 PROCEDURE — 92522 EVALUATE SPEECH PRODUCTION: CPT

## 2021-05-27 PROCEDURE — 88333 PATH CONSLTJ SURG CYTO XM 1: CPT

## 2021-05-27 PROCEDURE — 92610 EVALUATE SWALLOWING FUNCTION: CPT

## 2021-05-27 RX ADMIN — MONTELUKAST 10 MG: 10 TABLET, FILM COATED ORAL at 20:16

## 2021-05-27 RX ADMIN — Medication 10 ML: at 20:17

## 2021-05-27 RX ADMIN — PROPRANOLOL HYDROCHLORIDE 10 MG: 10 TABLET ORAL at 20:16

## 2021-05-27 RX ADMIN — MORPHINE SULFATE 2 MG: 4 INJECTION, SOLUTION INTRAMUSCULAR; INTRAVENOUS at 22:06

## 2021-05-27 RX ADMIN — MORPHINE SULFATE 2 MG: 4 INJECTION, SOLUTION INTRAMUSCULAR; INTRAVENOUS at 03:48

## 2021-05-27 RX ADMIN — BUSPIRONE HYDROCHLORIDE 10 MG: 10 TABLET ORAL at 20:16

## 2021-05-27 RX ADMIN — TOPIRAMATE 50 MG: 50 TABLET, FILM COATED ORAL at 20:17

## 2021-05-27 RX ADMIN — CEFEPIME HYDROCHLORIDE 2000 MG: 2 INJECTION, POWDER, FOR SOLUTION INTRAVENOUS at 07:53

## 2021-05-27 RX ADMIN — IPRATROPIUM BROMIDE 0.5 MG: 0.5 SOLUTION RESPIRATORY (INHALATION) at 19:23

## 2021-05-27 RX ADMIN — GADOBENATE DIMEGLUMINE 15 ML: 529 INJECTION, SOLUTION INTRAVENOUS at 10:07

## 2021-05-27 RX ADMIN — HYDROCODONE BITARTRATE AND ACETAMINOPHEN 2 TABLET: 5; 325 TABLET ORAL at 20:16

## 2021-05-27 RX ADMIN — CEFEPIME HYDROCHLORIDE 2000 MG: 2 INJECTION, POWDER, FOR SOLUTION INTRAVENOUS at 16:35

## 2021-05-27 RX ADMIN — DOCUSATE SODIUM 100 MG: 100 CAPSULE, LIQUID FILLED ORAL at 07:52

## 2021-05-27 RX ADMIN — ROPINIROLE HYDROCHLORIDE 0.25 MG: 0.5 TABLET, FILM COATED ORAL at 20:17

## 2021-05-27 RX ADMIN — IPRATROPIUM BROMIDE 0.5 MG: 0.5 SOLUTION RESPIRATORY (INHALATION) at 11:03

## 2021-05-27 RX ADMIN — CITALOPRAM HYDROBROMIDE 40 MG: 20 TABLET ORAL at 07:52

## 2021-05-27 RX ADMIN — PROPRANOLOL HYDROCHLORIDE 10 MG: 10 TABLET ORAL at 07:52

## 2021-05-27 RX ADMIN — PANTOPRAZOLE SODIUM 40 MG: 40 TABLET, DELAYED RELEASE ORAL at 07:52

## 2021-05-27 RX ADMIN — GABAPENTIN 300 MG: 300 CAPSULE ORAL at 07:52

## 2021-05-27 RX ADMIN — TOPIRAMATE 50 MG: 50 TABLET, FILM COATED ORAL at 07:52

## 2021-05-27 RX ADMIN — MORPHINE SULFATE 2 MG: 4 INJECTION, SOLUTION INTRAMUSCULAR; INTRAVENOUS at 16:54

## 2021-05-27 RX ADMIN — HYDROCODONE BITARTRATE AND ACETAMINOPHEN 2 TABLET: 5; 325 TABLET ORAL at 07:52

## 2021-05-27 RX ADMIN — GABAPENTIN 300 MG: 300 CAPSULE ORAL at 20:16

## 2021-05-27 RX ADMIN — ATORVASTATIN CALCIUM 40 MG: 40 TABLET, FILM COATED ORAL at 07:52

## 2021-05-27 RX ADMIN — VANCOMYCIN HYDROCHLORIDE 1000 MG: 10 INJECTION, POWDER, LYOPHILIZED, FOR SOLUTION INTRAVENOUS at 20:17

## 2021-05-27 RX ADMIN — BUSPIRONE HYDROCHLORIDE 10 MG: 10 TABLET ORAL at 07:52

## 2021-05-27 RX ADMIN — IPRATROPIUM BROMIDE 0.5 MG: 0.5 SOLUTION RESPIRATORY (INHALATION) at 14:45

## 2021-05-27 RX ADMIN — VANCOMYCIN HYDROCHLORIDE 1000 MG: 10 INJECTION, POWDER, LYOPHILIZED, FOR SOLUTION INTRAVENOUS at 07:53

## 2021-05-27 RX ADMIN — Medication 10 ML: at 08:21

## 2021-05-27 RX ADMIN — CLONAZEPAM 0.5 MG: 0.5 TABLET ORAL at 08:18

## 2021-05-27 RX ADMIN — CEFEPIME HYDROCHLORIDE 2000 MG: 2 INJECTION, POWDER, FOR SOLUTION INTRAVENOUS at 00:23

## 2021-05-27 RX ADMIN — THIAMINE HYDROCHLORIDE 100 MG: 100 INJECTION, SOLUTION INTRAMUSCULAR; INTRAVENOUS at 07:52

## 2021-05-27 RX ADMIN — HYDROCODONE BITARTRATE AND ACETAMINOPHEN 2 TABLET: 5; 325 TABLET ORAL at 15:07

## 2021-05-27 ASSESSMENT — PAIN SCALES - GENERAL
PAINLEVEL_OUTOF10: 9
PAINLEVEL_OUTOF10: 9
PAINLEVEL_OUTOF10: 7
PAINLEVEL_OUTOF10: 9
PAINLEVEL_OUTOF10: 0
PAINLEVEL_OUTOF10: 8
PAINLEVEL_OUTOF10: 9

## 2021-05-27 NOTE — PROGRESS NOTES
[facility] Neurology Progress Note      Patient:   Molly Page  MR#:    [id number]   Room:    [address] [place], [place] [address]   YOB: 1965  Date of Progress Note: 5/27/2021  Time of Note                           12:43 PM  Consulting Physician:  Stella Latham DO  Attending Physician:  Shannan Solomon MD      INTERVAL HISTORY:  No acute events overnight, noting left lower chest / upper abdominal pain this am.     REVIEW OF SYSTEMS:  Constitutional: No fevers No chills  Neck:No stiffness  Respiratory: No shortness of breath  Cardiovascular: No palpitations  Gastrointestinal: Abdominal pain    Genitourinary: No Dysuria  Neurological: No headache    PHYSICAL EXAM:    Constitutional -   /60   Pulse 86   Temp 96 °F (35.6 °C) (Temporal)   Resp 22   Ht 5' 3\" (1.6 m)   Wt 170 lb 8 oz (77.3 kg)   SpO2 94%   BMI 30.20 kg/m²   General appearance: No acute distress   EYES -   Conjunctiva normal  Pupillary exam as below, see CN exam in the neurologic exam  ENT-    No scars, masses, or lesions over external nose or ears  Oropharynx without erythema, palate midline  Cardiovascular -   No clubbing, cyanosis, or edema   Pulmonary-   Good expansion, normal effort without use of accessory muscles  Musculoskeletal -   No significant wasting of muscles noted  Gait as below, see gait exam in the neurologic exam  Muscle strength, tone, stability as below see the motor exam in the neurologic exam.   No bony deformities  Skin -   Warm, dry, and intact to inspection and palpation. No rash, erythema, or pallor  Psychiatric -   Mood, affect, and behavior appear normal    Memory as below see mental status examination in the neurologic exam        NEUROLOGICAL EXAM     Mental status    []? Awake, alert, oriented   []? Affect attention and concentration appear appropriate  []? Recent and remote memory appears unremarkable  []? Speech normal without dysarthria or aphasia, comprehension and repetition intact.    COMMENTS: Awake, speech limited, confusion improving   Cranial Nerves [x]? No VF deficit to confrontation  [x]? PERRLA, EOMI, no nystagmus, conjugate eye movements, no ptosis  [x]? Face symmetric  [x]? Facial sensation intact  [x]? Tongue midline no atrophy or fasciculations present  [x]? Palate midline, hearing to finger rub normal  [x]? Shoulder shrug and SCM testing normal  COMMENTS:   Motor   [x]? 5/5 strength x 4 extremities  [x]? Normal bulk and tone  [x]? No tremor present  [x]? No rigidity or bradykinesia noted  COMMENTS: 4/5 globally    Sensory  [x]? Sensation intact to light touch, pin prick, vibration, and proprioception BLE  []? Sensation intact to light touch, pin prick, vibration, and proprioception BUE  COMMENTS:   Coordination [x]? FTN normal bilaterally   []? HTS normal bilaterally  []? DANIA normal.   COMMENTS:   Reflexes  [x]? Symmetric and non-pathological  [x]? Toes downgoing bilaterally  [x]? No clonus present  COMMENTS:   Gait                  []? Normal steady gait    []? Ataxic    []? Spastic     []? Magnetic     []? Shuffling  [x]? Not assessed  COMMENTS:        LABS/IMAGING:    CT CHEST WO CONTRAST    Result Date: 5/24/2021  Examination. CT CHEST WO CONTRAST 5/24/2021 4:40 AM History: Follow-up after chest tube placement. DLP: 881 mGycm. A CT scan of the chest is performed without intravenous contrast enhancement. The images are acquired in axial plane with subsequent reconstruction in coronal and sagittal planes. The comparison is made with the previous study dated 5/23/2021. There is interval bone graft chest tube placement. There is significant improvement in the left-sided pleural effusion. Small residual pleural effusion is seen in the left lower chest. There is also small loculated fluid in the left lower chest posteriorly including the left major fissure. There is nodular thickening of the pleura more pronounced medially and posteriorly in the left chest no significant change since the previous study.  A nodular infiltrate in the left lower lobe, predominantly in the superior segment of the left lower lobe persists. The remaining lungs are well-expanded. There is a persistent mediastinal lymphadenopathy. No change. The hilar lymph nodes are not evaluated due to lack of contrast enhancement. There is moderate lobulated fullness of the left hilum which is similar to the previous study. The possibility of left hilar lymphadenopathy is not excluded. Atheromatous changes of the thoracic aorta are seen. No aneurysmal dilatation. There is moderate diffuse dilatation of the main pulmonary artery and branches. The limited visualized coronary arteries are unremarkable without significant atheromatous changes. The unenhanced liver and spleen appear unremarkable. The gallbladder is moderately distended with a layer of high density/sludge. No calculi. There are degenerative glands are normal. A nonobstructing calculus in the right kidney is reidentified. Moderately prominent celiac lymph nodes are similar to the previous study. A focal soft tissue thickening in the left upper abdomen laterally may represent extension of the pleural thickening/processes? . The images reviewed in bone window show no acute bony abnormality or a focal bony lesion. The hardware fusion of cervical spine is incompletely visualized. The interval significant decrease in the left-sided pleural effusion as detailed above. Multifocal nodular thickening of the pleura is similar to the previous study and may represent a neoplastic process. Evidence of mediastinal lymphadenopathy. Question left hilar lymphadenopathy. Proximal abdominal/celiac lymphadenopathy. Persistent nodular infiltrate in the left lower lobe superior segment. A nonobstructing right renal calculus.  Signed by Dr Phuc Hernández on 5/24/2021 7:35 AM    XR CHEST PORTABLE    Result Date: 5/23/2021  EXAM: XR CHEST PORTABLE -- 5/23/2021 1:11 PM HISTORY: 48 years, Female, status post chest tube placement COMPARISON: 5/23/2021 TECHNIQUE:  1 images. Frontal view of the chest. FINDINGS:  No pneumothorax. Interval placement left small bore chest tube. Decreased left pleural fluid. Mild diffuse hazy opacity of the left lung persists. Right lung clear. Cardiac and mediastinal silhouette within normal limits. Fixation hardware at the cervical spine. No acute bony finding. 1. Interval placement small bore left chest tube. Decreased left pleural effusion. Improved aeration of the left lung, which has persistent mild diffuse hazy opacity. Signed by Dr Nora Salvador on 5/23/2021 2:27 PM    XR CHEST PORTABLE    Result Date: 5/23/2021  EXAM: XR CHEST PORTABLE -- 5/23/2021 6:10 AM HISTORY: 55 years, Female, shortness of air COMPARISON: 5/17/2021 TECHNIQUE:  1 images. Frontal view of the chest. FINDINGS:  No pneumothorax. Right lung clear. Increased left basilar opacity and effusion with patchy upper lung opacities. Cardiac silhouette obscured by pulmonary opacity. Upper mediastinum within normal limits. Lower cervical spine fixation hardware. No acute bony finding. 1. Increased left basilar opacity and effusion compared to 5/17/2021. Similar patchy left upper lung opacities. Signed by Dr Nora Salvador on 5/23/2021 7:20 AM    CTA PULMONARY W CONTRAST    Result Date: 5/23/2021  EXAM: CTA PULMONARY W CONTRAST -- 5/23/2021 7:18 AM HISTORY: 55 years, Female, large effusion, low oxygen saturation COMPARISON: 10/30/2018, chest radiograph 5/23/2021 DLP: 415 mGy cm. Automated exposure control was utilized to minimize patient radiation dose. TECHNIQUE: Enhanced axial CT images obtained. 3-D postprocessing, including MIPS, performed and images saved to PACS. FINDINGS: AIRWAYS/PULMONARY PARENCHYMA: The central airways are midline and patent. Emphysematous changes. Right lung clear. No right pleural effusion. Left lung with compressive atelectasis at the lung base.  Patchy consolidative and groundglass opacities with left GLUCOSE 96 05/27/2021    CALCIUM 7.9 (L) 05/27/2021    PROT 6.6 05/26/2021    LABALBU 2.1 (L) 05/26/2021    BILITOT 0.4 05/26/2021    ALKPHOS 94 05/26/2021    AST 13 05/26/2021    ALT <5 (A) 05/26/2021    LABGLOM >60 05/27/2021    GFRAA >59 05/27/2021    AGRATIO 1.8 06/04/2018    GLOB 2.4 06/04/2018     Lab Results   Component Value Date    INR 1.24 (H) 05/27/2021    INR 1.12 05/23/2021    INR 0.91 10/29/2018    PROTIME 15.6 (H) 05/27/2021    PROTIME 14.4 05/23/2021    PROTIME 12.2 10/29/2018       RECORD REVIEW:   Previous medical records, medications were reviewed at today's visit. Nursing/physician notes, imaging, labs and vitals reviewed. PT,OT and/or speech notes reviewed    ASSESSMENT:  48 y.o. admitted with respiratory failure, pneumonia, large pleural effusion with chest tube placement, confusion, abnormal CT head with lytic skull lesions noted. Exam stable overnight. EEG with slowing, nothing epileptiform.      PLAN:  1. Follow up MRI brain  2. Oncology following. 3.  Limit sedating medications, stopped Elavil, decreased neurotin to 300 mg tid, may consider reducing topamax as well if does not improve. 4.  Thiamine  5. Continue addressing medical issues, pneumonia, pleural effusion, respiratory failure - chest tube removed. Please feel free to call with any questions. [phone] (cell phone).       Phani Menchaca DO  Board Certified Neurology

## 2021-05-27 NOTE — PROGRESS NOTES
Speech Language Pathology  Facility/Department: [facility]/VAS/MED   CLINICAL BEDSIDE SWALLOW EVALUATION  SPEECH PRODUCTION EVALUATION     NAME: Nahomi Weber  : 1965  MRN: [id number]    ADMISSION DATE: 2021  ADMITTING DIAGNOSIS: has Rectal bleeding; Rectal pain; Lower abdominal pain; Chronic constipation; Heartburn; Uncomfortable fullness after meals; Chronic GERD; Benign colon polyp; Panic attack; Depression; Neck pain; Chronic tension-type headache, intractable; Cervical radiculopathy; Cervical disc disease; Hypoesthesia of skin; Pain in both upper extremities; Abdominal cramping; Difficult bowel movements; Anxiety; Internal hemorrhoid, bleeding; Epigastric pain; Nausea and vomiting; Esophageal dysphagia; Acute respiratory failure with hypoxia ([address] Large pleural effusion; S/P chest tube placement ([address] Left lower lobe pneumonia; Hilar lymphadenopathy; Tobacco abuse counseling; Cigarette nicotine dependence with nicotine-induced disorder; Palliative care patient; Abnormal CT scan of head; and Weakness of both hands on their problem list.    Date of Eval: 2021  Evaluating Therapist: Debbi Matthews SLP    Reason for Referral  Nahomi Weber was referred for a bedside swallow evaluation to assess the efficiency of her swallow function, identify signs and symptoms of aspiration and make recommendations regarding safe dietary consistencies, effective compensatory strategies, and safe eating environment. Impression  Assessed patient's swallowing function. Patient exhibited decreased oral prep and decreased oral transit of more solid consistencies (regular solid consistency residue was slow but cleared from the mouth with additional dry swallows). Patient also exhibited fast oral transit and suspected swallow delay with thin liquids and sluggish, moderately decreased laryngeal elevation for swallow airway protection.  Even so, no outward S/S penetration/aspiration was noted with any ice chip present.)  Follows Directions: Simple   Dentition: Dentures  Patient Positioning: Upright in bed  Consistencies Administered: Ice chips;Dysphagia Pureed (Dysphagia I); Regular solid; Nectar - cup; Thin - cup      Oral Motor Examination   Labial ROM: (Decreased, bilaterally, during labial retraction trials and labial protrusion trials.)  Labial Strength: (Decreased during labial compression trials.)  Labial Coordination: (Slowed, decreased volitional movements were noted.)  Lingual ROM: (Decreased during lingual extension trials with no full point achieved; decreased during lingual elevation trials without use of accessory jaw movement; decreased movements noted bilaterally.)  Lingual Strength: (Decreased with fasciculations noted during lingual extension trials.)  Lingual Coordination: (Slowed movements were noted.)     Assessed patient's swallowing function with the following observations noted:     Oral Phase  Mastication: Ice chips;Regular solid (Patient exhibited slow oral prep with decreased rotary jaw movement noted during ice chip trials and regular solid consistency trials presented by SLP.)  Suspected Premature Bolus Loss: Regular solid; Thin - cup (Oral transit of regular solid consistency trials varied from 1-3 seconds in length. Patient exhibited fast oral transit of thin H2O trials presented via cup by SLP.)  Decreased Oral Transit: Regular solid (Moderate oral cavity residue was noted post swallows; residue was slow but cleared from the mouth with additional dry swallows.)  Oral Phase - Comment: Oral transit of ice chip trials primarily measured 1-2 seconds in length. Oral transit of puree consistency trials, presented by SLP, primarily measured 1-2 seconds in length and no oral cavity residue was noted post swallows. Oral transit of nectar thick liquid trials, presented via cup by SLP, primarily measured 1-2 seconds in length. Pharyngeal Phase  Suspected Swallow Delay: Regular solid; Thin - cup (Suspect secondary to oral transit times.)  Laryngeal Elevation: (Patient exhibited sluggish, moderately decreased laryngeal elevation for swallow airway protection.)  Pharyngeal Phase - Comment: No outward S/S penetration/aspiration was noted with any ice chip trial, puree consistency trial, regular solid consistency trial, mildly thick/nectar thick liquid trial, or thin H2O trial presented during assessment this date. At this time, safe diet recommendation would be mildly thick/nectar thick liquids with soft solids. Did discuss current swallow function and increased choking risk with thin liquids. With provision of education, patient family verbalized refusal of thickened liquids. Would subsequently recommend soft and bite sized consistency. Thin liquids, per patient/family request. Recommend meds in pudding/applesauce. Will continue to follow.      Electronically signed by DALTON Jim on 5/27/2021 at 1:07 PM

## 2021-05-27 NOTE — PLAN OF CARE
Problem: Pain:  Description: Pain management should include both nonpharmacologic and pharmacologic interventions. Goal: Pain level will decrease  Description: Pain level will decrease  Outcome: Ongoing  Goal: Control of acute pain  Description: Control of acute pain  Outcome: Ongoing  Goal: Control of chronic pain  Description: Control of chronic pain  Outcome: Ongoing     Problem: Falls - Risk of:  Goal: Will remain free from falls  Description: Will remain free from falls  Outcome: Ongoing  Goal: Absence of physical injury  Description: Absence of physical injury  Outcome: Ongoing     Problem:  Activity:  Goal: Fatigue will decrease  Description: Fatigue will decrease  Outcome: Ongoing  Goal: Amount of time patient spends in regular exercise will increase  Description: Amount of time patient spends in regular exercise will increase  Outcome: Ongoing     Problem: Cardiac:  Goal: Hemodynamic stability will improve  Description: Hemodynamic stability will improve  Outcome: Ongoing     Problem: Coping:  Goal: Level of anxiety will decrease  Description: Level of anxiety will decrease  Outcome: Ongoing  Goal: Ability to cope will improve  Description: Ability to cope will improve  Outcome: Ongoing  Goal: Ability to establish a method of communication will improve  Description: Ability to establish a method of communication will improve  Outcome: Ongoing  Goal: Ability to identify and develop effective coping behavior will improve  Description: Ability to identify and develop effective coping behavior will improve  Outcome: Ongoing  Goal: Ability to identify strategies to decrease anxiety will improve  Description: Ability to identify strategies to decrease anxiety will improve  Outcome: Ongoing     Problem: Nutritional:  Goal: Consumption of the prescribed amount of daily calories will improve  Description: Consumption of the prescribed amount of daily calories will improve  Outcome: Ongoing  Goal: Adjustment of eating patterns to appropriate times will improve  Description: Adjustment of eating patterns to appropriate times will improve  Outcome: Ongoing  Goal: Ability to make healthy dietary choices will improve  Description: Ability to make healthy dietary choices will improve  Outcome: Ongoing  Goal: Progress toward achieving an optimal weight will improve  Description: Progress toward achieving an optimal weight will improve  Outcome: Ongoing  Goal: Ability to chew and swallow food without choking will improve  Description: Ability to chew and swallow food without choking will improve  Outcome: Ongoing  Goal: Ability to achieve adequate nutritional intake will improve  Description: Ability to achieve adequate nutritional intake will improve  Outcome: Ongoing  Goal: Ability to maintain an optimal weight for height and age will improve  Description: Ability to maintain an optimal weight for height and age will improve  Outcome: Ongoing     Problem: Respiratory:  Goal: Ability to maintain a clear airway will improve  Description: Ability to maintain a clear airway will improve  Outcome: Ongoing  Goal: Ability to maintain adequate ventilation will improve  Description: Ability to maintain adequate ventilation will improve  Outcome: Ongoing  Goal: Complications related to the disease process, condition or treatment will be avoided or minimized  Description: Complications related to the disease process, condition or treatment will be avoided or minimized  Outcome: Ongoing     Problem: Skin Integrity:  Goal: Risk for impaired skin integrity will decrease  Description: Risk for impaired skin integrity will decrease  Outcome: Ongoing     Problem: Self-Concept:  Goal: Ability to verbalize positive feelings about self will improve  Description: Ability to verbalize positive feelings about self will improve  Outcome: Ongoing     Problem: Bleeding:  Goal: Will show no signs and symptoms of excessive bleeding  Description: Will show no signs and symptoms of excessive bleeding  Outcome: Ongoing     Problem: SAFETY  Goal: Free from accidental physical injury  Outcome: Ongoing  Goal: Free from intentional harm  Outcome: Ongoing     Problem: DAILY CARE  Goal: Daily care needs are met  Outcome: Ongoing     Problem: SKIN INTEGRITY  Goal: Skin integrity is maintained or improved  Outcome: Ongoing     Problem: KNOWLEDGE DEFICIT  Goal: Patient/S.O. demonstrates understanding of disease process, treatment plan, medications, and discharge instructions.   Outcome: Ongoing     Problem: DISCHARGE BARRIERS  Goal: Patient's continuum of care needs are met  Outcome: Ongoing     Problem: ABCDS Injury Assessment  Goal: Absence of physical injury  Outcome: Ongoing     Problem: Fluid Volume - Deficit:  Goal: Achieves intake and output within specified parameters  Description: Achieves intake and output within specified parameters  Outcome: Ongoing     Problem: Gas Exchange - Impaired:  Goal: Levels of oxygenation will improve  Description: Levels of oxygenation will improve  Outcome: Ongoing     Problem: Hyperthermia:  Goal: Ability to maintain a body temperature in the normal range will improve  Description: Ability to maintain a body temperature in the normal range will improve  Outcome: Ongoing     Problem: Tobacco Use:  Goal: Will participate in inpatient tobacco-use cessation counseling  Description: Will participate in inpatient tobacco-use cessation counseling  Outcome: Ongoing

## 2021-05-27 NOTE — PROCEDURES
ADULT INPATIENT ELECTROENCEPHALOGRAM REPORT    Patient:   Sumeet Rehman  MR#:    [id number]  Room #:    INPATIENT  YOB: 1965  Date of Evaluation:  5/26/2021  Primary Physician:     JERILYN Barnett   Referring Physician:   John Hernandez DO      CLINICAL INFORMATION:     This patient is a 48 y.o. female with a history of confusion. MEDICATIONS:     See MAR. RECORDING CONDITIONS:     This EEG was performed utilizing standard International 10-20 System of electrode placement, with additional channels monitored for eye movement. One channel electrocardiogram was monitored. Data was obtained, stored, and interpreted according to ACNS guidelines (J Clin Neurophysiol 2006;23(2):) utilizing referential montage recording, with reformatting to longitudinal, transverse bipolar, and referential montages as necessary for interpretation, along with the digital/automated EEG analysis. Patient tolerated entire procedure well. Photic stimulation and hyperventilation were utilized as activation procedures unless otherwise specified below. E.E.G. DESCRIPTION:     The resting predominant posterior background frequency is a 6-7 Hz 30-40 uV rhythm. No overt focal, lateralizing, or paroxysmal abnormalities were noted through the recording. Drowsiness and sleep were not demonstrated. Hyperventilation was not performed. Photic stimulation was performed and had little change on the recording. No ECG abnormalities were noted. Significant muscle, motion, and eye movement artifacts were noted. EEG INTERPRETATION:    Abnormal EEG due to diffuse slowing of the background rhythm. No overt epileptiform abnormalities were noted. CLINICAL CORRELATION:     This EEG is suggestive of a moderate encephalopathy, nonspecific to etiology.          John Hernandez DO  Board Certified Neurologist    Date reported: 5/27/2021  Date signed: 5/27/2021

## 2021-05-27 NOTE — PROGRESS NOTES
Pulmonary and Critical Care Progress note. [address]    MRN# [id number]    Acct# [de-identified]  5/27/2021   10:41 AM CDT    Referring Vy Matthews MD      Chief Complaint: pleural effusion    HPI: over the last 24 hours she continues to be minimally symptomatic, discussed with Dr. Fleurette Cogan cytology. The fluid is malignant. Further identification pending immunohistochemical stains. Discussed with Dr. Mike Polanco. He indicates need for more tissue. Discussed with radiology. CT-guided needle aspiration of pleural based mass was done.   Patient tolerated well    Medications    gabapentin, 300 mg, Oral, TID    thiamine, 100 mg, Intravenous, Daily    ipratropium, 0.5 mg, Nebulization, 4x daily    docusate sodium, 100 mg, Oral, Daily    nicotine, 1 patch, Transdermal, Daily    vancomycin (VANCOCIN) intermittent dosing (placeholder), , Other, RX Placeholder    rOPINIRole, 0.25 mg, Oral, Nightly    pantoprazole, 40 mg, Oral, QAM AC    propranolol, 10 mg, Oral, BID    topiramate, 50 mg, Oral, BID    citalopram, 40 mg, Oral, Daily    busPIRone, 10 mg, Oral, TID    atorvastatin, 40 mg, Oral, Daily    sodium chloride flush, 5-40 mL, Intravenous, 2 times per day    enoxaparin, 40 mg, Subcutaneous, Q24H    cefepime, 2,000 mg, Intravenous, Q8H    montelukast, 10 mg, Oral, Nightly    vancomycin, 1,000 mg, Intravenous, Q12H     Review of Systems:  RESPIRATORY:  positive for  dry cough  CARDIOVASCULAR:  positive for  dyspnea    Physical Exam:  /60   Pulse 86   Temp 96 °F (35.6 °C) (Temporal)   Resp 22   Ht 5' 3\" (1.6 m)   Wt 170 lb 8 oz (77.3 kg)   SpO2 94%   BMI 30.20 kg/m²     Intake/Output Summary (Last 24 hours) at 5/27/2021 1406  Last data filed at 5/27/2021 1020  Gross per 24 hour   Intake 0 ml   Output 350 ml   Net -350 ml       General Appearance: alert and oriented to person, place and time, well-developed and well-nourished, in no acute distress  ENT: no jvd no LN. PERRL EOMI  Pulmonary/Chest: Diminished breath sounds bilaterally. No rubs or chest wall tenderness or dullness to percussion  Cardiovascular: normal rate, normal S1 and S2, no gallops, intact distal pulses and no carotid bruits  Abdomen: soft, non-tender, non-distended, normal bowel sounds, no masses or organomegaly  Extremities: no cyanosis, no clubbing and no edema  Neurologic: Nonfocal exam    Recent Labs     05/25/21  0150 05/26/21 0240 05/27/21 0228   WBC 11.6* 10.7 11.8*   RBC 3.61* 3.51* 3.28*   HGB 11.1* 10.9* 10.1*   HCT 35.3* 34.3* 32.0*   * 430* 471*   MCV 97.8 97.7 97.6   MCH 30.7 31.1* 30.8   MCHC 31.4* 31.8* 31.6*   RDW 12.4 12.5 12.3      Recent Labs     05/25/21  0150 05/25/21  0336 05/26/21 0240 05/27/21 0228     --  136 135*   K 4.3 3.5 3.8 3.6     --  101 99   CO2 24  --  27 23   BUN 12  --  9 9   CREATININE 0.7  --  0.6 0.5   CALCIUM 8.7  --  8.4* 7.9*   GLUCOSE 114*  --  94 96      Recent Labs     05/25/21 0336   PHART 7.390   PFX2RCG 42.0   PO2ART 77.0*   AVC8MXA 25.4   X2UWMZFR 94.1   BEART 0.3     Recent Labs     05/26/21  1753 05/27/21 0228 05/27/21  0921   AST 13  --   --    ALT <5*  --   --    ALKPHOS 94  --   --    BILITOT 0.4  --   --    CALCIUM  --  7.9*  --    INR  --   --  1.24*     No results for input(s): BC, LABGRAM, CULTRESP, BFCX in the last 72 hours. Radiograph: CT CHEST WO CONTRAST    Result Date: 5/24/2021  The interval significant decrease in the left-sided pleural effusion as detailed above. Multifocal nodular thickening of the pleura is similar to the previous study and may represent a neoplastic process. Evidence of mediastinal lymphadenopathy. Question left hilar lymphadenopathy. Proximal abdominal/celiac lymphadenopathy. Persistent nodular infiltrate in the left lower lobe superior segment. A nonobstructing right renal calculus.  Signed by Dr Sommer Lobo on 5/24/2021 7:35 AM    XR CHEST PORTABLE    Result Date: 5/25/2021  Impression: No significant change in trace LEFT pleural effusion and LEFT lower lobe atelectasis. Signed by Dr Annmarie Ang on 5/25/2021 11:18 AM    XR CHEST PORTABLE    Result Date: 5/23/2021  1. Interval placement small bore left chest tube. Decreased left pleural effusion. Improved aeration of the left lung, which has persistent mild diffuse hazy opacity. Signed by Dr Gaston Ann on 5/23/2021 2:27 PM    XR CHEST PORTABLE    Result Date: 5/23/2021  1. Increased left basilar opacity and effusion compared to 5/17/2021. Similar patchy left upper lung opacities. Signed by Dr Gaston Ann on 5/23/2021 7:20 AM    CTA PULMONARY W CONTRAST    Result Date: 5/23/2021  1. Large left pleural effusion with pleural nodularity. Patchy left lung consolidative and groundglass opacities with left lower lobe atelectasis. Left hilar and mediastinal adenopathy. Overall, appearance is most concerning for malignancy. Malignancy with superimposed infection would also be a consideration. 2. Left retroperitoneal lymphadenopathy. 3. Nonobstructing right renal calculi. Signed by Dr Gaston Ann on 5/23/2021 9:03 AM       My radiograph interpretation/independent review of imaging: reviewed.      Problem list generated by [facility] Problems         Last Modified POA    * (Principal) Acute respiratory failure with hypoxia ([address] 5/25/2021 Yes    Chronic constipation 5/24/2021 Yes    Heartburn 5/24/2021 Yes    Chronic GERD 5/24/2021 Yes    Depression 5/24/2021 Yes    Chronic tension-type headache, intractable (Chronic) 5/24/2021 Yes    Cervical radiculopathy 5/24/2021 Yes    Anxiety 5/24/2021 Yes    Nausea and vomiting 5/24/2021 Yes    Esophageal dysphagia 5/24/2021 Yes    Large pleural effusion 5/24/2021 Yes    S/P chest tube placement ([address] 5/24/2021 Yes    Left lower lobe pneumonia 5/24/2021 Yes    Hilar lymphadenopathy 5/24/2021 Yes    Tobacco abuse counseling 5/24/2021 Yes    Cigarette nicotine dependence with nicotine-induced disorder 5/24/2021 Yes    Palliative care patient 5/25/2021 Yes    Abnormal CT scan of head 5/26/2021 Yes    Weakness of both hands 5/26/2021 Yes           Pulmonary Assessment/Plan:     1. Acute hypoxic respiratory failure likely secondary to pleural effusion and underlying lung pathology. Continue oxygen as necessary to maintain adequate oxygenation. 2. Pleural effusion malignant. CT-guided core biopsy of pleural-based mass was done earlier today. Await results. She tolerated well. 3. Possible community-acquired pneumonia. Continue antibiotic therapy empirically. 4. Feeling of dysequilibrium? Neurology following. 5. Chest wall pain likely secondary to the above supportive care.   6. DVT prophylaxis          Electronically signed by Cathy Fishman MD on 05/27/21 at 2:06 PM

## 2021-05-27 NOTE — PROGRESS NOTES
Palliative Care Progress Note  5/27/2021 11:52 AM    Patient:  Eb Dias  YOB: 1965  Primary Care Physician: JERILYN Reid  Advance Directive: DNR-CC  Admit Date: 5/23/2021       Hospital Day: 4  Portions of this note have been copied forward, however, changed to reflect the most current clinical status of this patient. CHIEF COMPLAINT/REASON FOR CONSULTATION Goals of care, code status, family support    SUBJECTIVE: Ms. Estrella Adame complains of pain this morning that worsened overnight. She describes it as continuous, dull with occasional sharp pain predominantly in the posterior left lower lobe. It is worse with deep inspiration, movement. Describes it as 9-10 at times. Pain medication brings it to 3-4. She is currently receiving Norco 10 mg q 4 prn in addition to Morphine IV 2 mg q 2 prn pain. She denies nausea, vomiting or constipation this morning. She does become anxious when she realizes her mother isn't present in the room who left while patient was downstairs undergoing MRI. This was explained to patient. Comfort/support offered. Interval History:   Ms. Estrella Adame is a 47 yo female with PMH lupus, anxiety, chronic migraines who was admitted to [facility] after she presented in respiratory distress. A large left pleural effusion was seen on CTA. She underwent chest tube placement with fluid sent for cytology. Chest tube removed 05/25/2021 and patient was transferred to medical floor from ICU. She exhibited slurred speech, difficulty with left upper extremity  strength. Neurology was consulted. Elavil discontinued, neurontin decreased to 300 mg TID, CT head performed which revealed lytic lesions. Oncology consulted. EEG unremarkable. MRI pending. Review of Systems:   14 point review of systems is negative except as specifically addressed above.     Objective:   VITALS:  /60   Pulse 86   Temp 96 °F (35.6 °C) (Temporal)   Resp 22   Ht 5' 3\" (1.6 m)   Wt 170 lb 8 oz (77.3 kg)   SpO2 94%   BMI 30.20 kg/m²   24HR INTAKE/OUTPUT:      Intake/Output Summary (Last 24 hours) at 5/27/2021 1152  Last data filed at 5/27/2021 1020  Gross per 24 hour   Intake 0 ml   Output 350 ml   Net -350 ml     General appearance: 49 yo female, no acute distress, resting comfortably in bed   Head: Normocephalic, without obvious abnormality, atraumatic  Eyes: conjunctivae/corneas clear. PERRL, EOM's intact. Ears: normal external ears and nose, throat without exudate  Neck: no adenopathy, no carotid bruit, no JVD, supple, symmetrical, trachea midline   Lungs: diminished air entry, worse in left base with rales noted, upper airway rhonchi cleared with cough  Heart: tachycardic, regular rhythm, S1, S2 normal, soft murmur  Abdomen:soft, non-tender; non-distended, bowel sounds present   Extremities:trace lower extremity edema,  No erythema, no tenderness to palpation  Skin: Skin color, texture, turgor normal. No rashes or lesions  Lymphatic: No palpable lymph node enlargment  Neurologic: Alert and oriented X self, place and year though does take several minutes to become oriented after awakening patient with soft verbal stimuli. She follows commands though does have jerking type movements at times with her LUE. Generalized weakness. Confused at times.   Psychiatric: Calm, flat affect     Medications:      sodium chloride        gabapentin  300 mg Oral TID    thiamine  100 mg Intravenous Daily    ipratropium  0.5 mg Nebulization 4x daily    docusate sodium  100 mg Oral Daily    nicotine  1 patch Transdermal Daily    vancomycin (VANCOCIN) intermittent dosing (placeholder)   Other RX Placeholder    rOPINIRole  0.25 mg Oral Nightly    pantoprazole  40 mg Oral QAM AC    propranolol  10 mg Oral BID    topiramate  50 mg Oral BID    citalopram  40 mg Oral Daily    busPIRone  10 mg Oral TID    atorvastatin  40 mg Oral Daily    sodium chloride flush  5-40 mL Intravenous 2 times per day    enoxaparin  40 mg tolerate any treatment. She would like to complete work up and then make decisions on how to progress from there. Her mother is supportive of her decisions. Recommendations:   1. Palliative Care-GOC to be determined based on findings of work up. Will continue to follow closely. Conversation ongoing. Awaiting pathology report/myeloma studies/MRI/CT sx evaluation. Code status:DO NOT RESUSCITATE. This was confirmed with patient and her mother Jerry Muñoz who is listed as healthcare surrogate     2. Large left pleural effusion w/ hilar/mediastinal lymphadenopathy seen on CTA. S/p chest tube insertion and removal. Cytology pending. Oncology following. Ongoing work up. 3. Lytic lesions on skull, new problem-Oncology following, multiple myeloma in differential though metastatic bone lesions suspected    3. Pain-generalized, worse in posterior left lower lobe area near site of chest tube placement-IV Morphine prn, Norco prn. Will monitor closely with decrease in Neurontin    4. Altered mental status-patient back to baseline, EEG unremarkable. MRI pending. Neurology following    Thank you for consulting Palliative Care and allowing us to participate in the care of this patient.    Time Spent Counseling > 50%:  YES                                   Total Time Spent with patient/family counseling, workup/treatment review, counseling and placement of orders/preparation of this note: 40 minutes     Electronically signed by Monie Ingram PA-C on 5/27/2021 at 11:52 AM    (Please note that portions of this note were completed with a voice recognition program.  Claudene Borer made to edit the dictations but occasionally words are mis-transcribed.)

## 2021-05-27 NOTE — PROGRESS NOTES
the pleural effusion fluid to determine whether it is tumor/infectious etiology before consulting oncology. 5/23/2021  HISTORY OF PRESENT ILLNESS:   The patient is a 48 y.o. female presents with worsening shortness of breath. She has not felt well for the last 4 weeks. She was recently started on antibiotics as an outpatient for left-sided pneumonia. Her breathing got significantly worse this morning prompting her to come into the emergency department. She had a CT angiogram of the chest as part of her work-up. This was negative for PE, unfortunately, it appears as though that she may have metastatic lung carcinoma with a very large left-sided pleural effusion. I did notify her of these findings. We will get pulmonology and oncology on board. I feel that she will most likely also need a left-sided chest tube for drainage. She is obviously distraught at the news.       REVIEW  OF  SYSTEMS:    Constitutional:  No fevers, chills, nausea, vomiting, + tiredness and fatigue   Lungs:   No hemoptysis, pleurisy, cough, +SOB, + left-sided chest tube in place   Heart:  No chest pressure with exertion, palpitations,    Abdomen:   No new masses, no bright red blood per rectum   Extremities:  + difficulty ambulation due to weakness, no new lesions   Neurologic: No new motor or sensory changes, ++ headaches, + weakness of  in both hands       PAST MEDICAL HISTORY:  Past Medical History:   Diagnosis Date    Anxiety     Arthritis     Chronic tension-type headache, intractable 1/8/2016    Depression     Headache(784.0)     migraine    Lupus ([address]     Neck pain 1/8/2016         PAST SURGICAL HISTORY:  Past Surgical History:   Procedure Laterality Date    COLONOSCOPY  10/23/2013    Dr Kulwant Garland: internal hemorrhoids; hyperplastic polyps (5 yr recal for screening)    HYSTERECTOMY      NECK SURGERY  07/26/2016    Dr Kirk Billingsley FLX DX W/TARUN Zamarripa 1977 PFRMD N/A 2/6/2017    COLONOSCOPY DIAGNOSTIC OR SCREENING performed by Benjamín Zapata DO at [facility] OR    PA EGD TRANSORAL BIOPSY SINGLE/MULTIPLE N/A 7/26/2018    Dr Mandel-w/dilation over wire-[address] [place]-mild esophageal narrowing-Yas (-)    TUBAL LIGATION      TUMOR REMOVAL      right deltoid area    UPPER GASTROINTESTINAL ENDOSCOPY  11/06/2013    Dr Blum Bis: normal    UPPER GASTROINTESTINAL ENDOSCOPY  7/26/2018    Dr Mandel-w/dilation over wire-[address] [place]-mild esophageal narrowing-Yas (-)        FAMILY HISTORY:  Family History   Problem Relation Age of Onset    Colon Cancer Maternal Aunt     Colon Polyps Maternal Aunt     Liver Cancer Neg Hx     Liver Disease Neg Hx     Rectal Cancer Neg Hx     Stomach Cancer Neg Hx            OBJECTIVE:  /75   Pulse 113   Temp 98.2 °F (36.8 °C) (Temporal)   Resp 16   Ht 5' 3\" (1.6 m)   Wt 171 lb 14.4 oz (78 kg)   SpO2 90%   BMI 30.45 kg/m²   No intake/output data recorded.     PHYSICAL  EXAMINATION:    SEN:  Awake, alert, oriented x 3, patient appears tired and fatigued   Head/Eyes:  Normocephalic, atraumatic, EOMI and PERRLA bilaterally   Respiratory:   Bilateral decreased air entry in both lung fields, mild B/L crackles, + left-sided chest tube in place   Cardiovascular:  Regular rate and rhythm, S1+S2+0, no murmurs/rubs   Abdomen:   Soft, non-tender, bowel sounds +ve, no organomegaly   Extremities: Moves all, decreased range of motion, no edema   Neurologic: Awake, alert, oriented x 3, cranial nerves II-XII intact, + weakened bilateral handgrips, sensory system intact   Psychiatric: Normal mood, non-suicidal       CURRENT MEDICATIONS:  Scheduled:   gabapentin  300 mg Oral TID    thiamine  100 mg Intravenous Daily    ipratropium  0.5 mg Nebulization 4x daily    docusate sodium  100 mg Oral Daily    nicotine  1 patch Transdermal Daily    vancomycin (VANCOCIN) intermittent dosing (placeholder)   Other RX Placeholder    rOPINIRole  0.25 mg Oral Nightly    pantoprazole  40 mg Oral QAM AC    propranolol  10 mg Oral BID    topiramate  50 mg Oral BID    citalopram  40 mg Oral Daily    busPIRone  10 mg Oral TID    atorvastatin  40 mg Oral Daily    sodium chloride flush  5-40 mL Intravenous 2 times per day    enoxaparin  40 mg Subcutaneous Q24H    cefepime  2,000 mg Intravenous Q8H    montelukast  10 mg Oral Nightly    vancomycin  1,000 mg Intravenous Q12H        PRN:  naloxone, 0.4 mg, PRN  HYDROcodone 5 mg - acetaminophen, 1 tablet, Q4H PRN   Or  HYDROcodone 5 mg - acetaminophen, 2 tablet, Q4H PRN  clonazePAM, 0.5 mg, BID PRN  ondansetron, 4 mg, Q8H PRN  sodium chloride flush, 10 mL, PRN  sodium chloride, 25 mL, PRN  promethazine, 12.5 mg, Q6H PRN   Or  ondansetron, 4 mg, Q6H PRN  polyethylene glycol, 17 g, Daily PRN  acetaminophen, 650 mg, Q6H PRN   Or  acetaminophen, 650 mg, Q6H PRN        Infusions:   sodium chloride         Laboratory Data:  Recent Labs     05/24/21  0123 05/25/21  0150 05/26/21  0240   WBC 14.5* 11.6* 10.7   HGB 11.2* 11.1* 10.9*   * 475* 430*     Recent Labs     05/24/21  0123 05/25/21  0150 05/25/21  0336 05/26/21  0240    136  --  136   K 4.7 4.3 3.5 3.8    101  --  101   CO2 24 24  --  27   BUN 9 12  --  9   CREATININE 0.6 0.7  --  0.6   GLUCOSE 131* 114*  --  94     Recent Labs     05/26/21  1753   AST 13   ALT <5*   BILITOT 0.4   ALKPHOS 94     Troponin T:   No results for input(s): TROPONINI in the last 72 hours. Pro-BNP: No results for input(s): BNP in the last 72 hours. INR:   No results for input(s): INR in the last 72 hours. UA:No results for input(s): NITRITE, COLORU, PHUR, LABCAST, WBCUA, RBCUA, MUCUS, TRICHOMONAS, YEAST, BACTERIA, CLARITYU, SPECGRAV, LEUKOCYTESUR, UROBILINOGEN, BILIRUBINUR, BLOODU, GLUCOSEU, AMORPHOUS in the last 72 hours. Invalid input(s): Juanita Feldman  A1C: No results for input(s): LABA1C in the last 72 hours.   ABG:  Recent Labs     05/25/21  0336   PHART 7.390   UNZ1VVM 42.0   PO2ART 77.0*   XJR6ZIJ 25.4   BEART 0.3 HGBAE 11.4*   [id number] 94.1   CARBOXHGBART 1.8         Imaging:    CT CHEST WO CONTRAST    Result Date: 5/24/2021  The interval significant decrease in the left-sided pleural effusion as detailed above. Multifocal nodular thickening of the pleura is similar to the previous study and may represent a neoplastic process. Evidence of mediastinal lymphadenopathy. Question left hilar lymphadenopathy. Proximal abdominal/celiac lymphadenopathy. Persistent nodular infiltrate in the left lower lobe superior segment. A nonobstructing right renal calculus. Signed by Dr Pan Ross on 5/24/2021 7:35 AM    XR CHEST PORTABLE    Result Date: 5/23/2021  1. Interval placement small bore left chest tube. Decreased left pleural effusion. Improved aeration of the left lung, which has persistent mild diffuse hazy opacity. Signed by Dr Dayna Mae on 5/23/2021 2:27 PM    XR CHEST PORTABLE    Result Date: 5/23/2021  1. Increased left basilar opacity and effusion compared to 5/17/2021. Similar patchy left upper lung opacities. Signed by Dr Dayna Mae on 5/23/2021 7:20 AM    CTA PULMONARY W CONTRAST    Result Date: 5/23/2021  1. Large left pleural effusion with pleural nodularity. Patchy left lung consolidative and groundglass opacities with left lower lobe atelectasis. Left hilar and mediastinal adenopathy. Overall, appearance is most concerning for malignancy. Malignancy with superimposed infection would also be a consideration. 2. Left retroperitoneal lymphadenopathy. 3. Nonobstructing right renal calculi.  Signed by Dr Dayna Mae on 5/23/2021 9:03 AM       ASSESSMENT & PLAN:    Principal Problem:    Acute respiratory failure with hypoxia (HCC)  Active Problems:    Chronic constipation    Heartburn    Chronic GERD    Depression    Chronic tension-type headache, intractable    Cervical radiculopathy    Anxiety    Nausea and vomiting    Esophageal dysphagia    Large pleural effusion    S/P chest tube placement (HCC)    Left minutes spent on tobacco cessation counseling with the patient. Websites - http://smokefree. gov & Legal[employer]. com   °Quitlines - 1-800-QUIT-NOW (7-100.583.7947)   °Smokefree Apps - QuitSTART Derick   °Text Messages - SmokefreeTXT (send the word QUIT to [id number])       Repeat labs in a.m. Electrolyte replacement as per protocol. Patient will be monitored very closely on the floor. Further recommendations as per the hospital course. Discharge Plan: To be determined as per the hospital course      Patient  is on DVT prophylaxis  Current medications reviewed  Lab work reviewed  Radiology/Chest x-ray films reviewed  Treatment recommendations from suspecialities reviewed, appreciated and agreed with  Discussed with the nurse and addressed all questions/concerns  Discussed with Patient and/or Family at the bedside in detail . .. they understand and agree with the management plan. I attest that I spend >35 minutes engaged in critical care of this patient. This includes review of EMR data, imaging, bedside evaluation, patient/family counseling and coordination of care with nursing, providers and consultants. Mack Clements MD  5/26/2021 8:42 PM      DISCLAIMER: This note was created with electronic voice recognition which does have occasional errors. If you have any questions regarding the content within the note please do not hesitate to contact me. .. Thanks.

## 2021-05-27 NOTE — CONSULTS
MEDICAL ONCOLOGY CONSULTATION    Pt Name: Bhargavi Dominguez  MRN: [id number]  YOB: 1965  Date of evaluation: 5/27/2021    REASON FOR CONSULTATION:  Lytic lesions skull  REQUESTING PHYSICIAN:Hospitalist    History Obtained From:    patient, electronic medical record    HISTORY OF PRESENT ILLNESS:  Gray Kumar was first seen by me on 5/27/2021. The patient presented to the ER department with complaints of increasing short of breath over the last 5 weeks. She was treated with outpatient antibiotics for pneumonia without improvement. She was on ciprofloxacin and Augmentin. Her respiratory status got worse and she presented to the emergency with hypoxemia. Imaging studies revealed a large left pleural effusion with left lung consolidation, hilar/mediastinal adenopathy. The findings were concerning for underlying malignancy. Of note, she has several comorbiditiesto include a history of anxiety, migraines, systemic lupus erythematosus.         Past Medical History:    Past Medical History:   Diagnosis Date    Anxiety     Arthritis     Chronic tension-type headache, intractable 1/8/2016    Depression     Headache(784.0)     migraine    Lupus ([address]     Neck pain 1/8/2016       Past Surgical History:    Past Surgical History:   Procedure Laterality Date    COLONOSCOPY  10/23/2013    Dr Gosia Vincent: internal hemorrhoids; hyperplastic polyps (5 yr recal for screening)    HYSTERECTOMY      NECK SURGERY  07/26/2016    Dr Vinnie Newby FLX DX W/COLLJ SPEC WHEN PFRMD N/A 2/6/2017    COLONOSCOPY DIAGNOSTIC OR SCREENING performed by Kelley Monge DO at [address] [facility] [facility] OR    NC EGD TRANSORAL BIOPSY SINGLE/MULTIPLE N/A 7/26/2018    Dr Mandel-gal/dilation over wire-51 Occitan-mild esophageal narrowing-Yas (-)    TUBAL LIGATION      TUMOR REMOVAL      right deltoid area    UPPER GASTROINTESTINAL ENDOSCOPY  11/06/2013    Dr Gosia Vincent: normal    UPPER GASTROINTESTINAL ENDOSCOPY  7/26/2018    Dr Mandel-gal/dilation over wire-51 German-mild esophageal narrowing-Ays (-)       Social History:    Smoking status: 16-pack-year smoking history. ETOH status: She denies any drinking history.   Resides: [address] [place] [address], at home    Family History:   Family History   Problem Relation Age of Onset    Colon Cancer Maternal Aunt     Colon Polyps Maternal Aunt     Liver Cancer Neg Hx     Liver Disease Neg Hx     Rectal Cancer Neg Hx     Stomach Cancer Neg Hx        Current Hospital Medications:    Current Facility-Administered Medications   Medication Dose Route Frequency Provider Last Rate Last Admin    gabapentin (NEURONTIN) capsule 300 mg  300 mg Oral TID Yanelis Oquendo DO   300 mg at 05/26/21 2028    thiamine (B-1) injection 100 mg  100 mg Intravenous Daily Yanelis Oquendo DO   100 mg at 05/26/21 1645    morphine injection 2 mg  2 mg Intravenous Q2H PRN Eulogio Horvath MD   2 mg at 05/27/21 0348    naloxone (NARCAN) injection 0.4 mg  0.4 mg Intravenous PRN Eulogio Horvath MD   0.4 mg at 05/25/21 0513    ipratropium (ATROVENT) 0.02 % nebulizer solution 0.5 mg  0.5 mg Nebulization 4x daily Eulogio Horvath MD   0.5 mg at 05/26/21 1439    HYDROcodone-acetaminophen (NORCO) 5-325 MG per tablet 1 tablet  1 tablet Oral Q4H PRN Eulogio Horvath MD        Or    HYDROcodone-acetaminophen (NORCO) 5-325 MG per tablet 2 tablet  2 tablet Oral Q4H PRN Eulogio Horvath MD   2 tablet at 05/25/21 2323    docusate sodium (COLACE) capsule 100 mg  100 mg Oral Daily Alina Lerma PA-C   100 mg at 05/26/21 0836    nicotine (NICODERM CQ) 14 MG/24HR 1 patch  1 patch Transdermal Daily Eulogio Horvath MD   1 patch at 05/26/21 0836    vancomycin (VANCOCIN) intermittent dosing (placeholder)   Other RX Placeholder Eulogio Horvath MD        clonazePAM (KLONOPIN) tablet 0.5 mg  0.5 mg Oral BID PRN Eulogio Horvath MD   0.5 mg at 05/25/21 2321    rOPINIRole (REQUIP) tablet 0.25 mg  0.25 mg Oral Nightly Eulogio Horvath MD   0.25 mg at 05/26/21 2028    pantoprazole (PROTONIX) tablet [Albuterol] Swelling         Subjective   REVIEW OF SYSTEMS:   CONSTITUTIONAL: no fever, no night sweats,  fatigue; weight gain  HEENT: no blurring of vision, no double vision, no hearing difficulty, no tinnitus, no ulceration, no epistaxis;  LUNGS: cough, no hemoptysis, no wheeze,   shortness of breath;  CARDIOVASCULAR: no palpitation, no chest pain, shortness of breath;  GI: no abdominal pain, no nausea, no vomiting, no diarrhea, no constipation;  ORESTES: no dysuria, no hematuria, no frequency or urgency, no nephrolithiasis;  MUSCULOSKELETAL: Generalized weakness, joint pain, no swelling, no stiffness;  ENDOCRINE: no polyuria, no polydipsia, no cold or heat intolerance;  HEMATOLOGY: no easy bruising or bleeding, no history of clotting disorder;  DERMATOLOGY: no skin rash, no eczema, no pruritus;  PSYCHIATRY: depression,  anxiety, no panic attacks, no suicidal ideation, no homicidal ideation;  NEUROLOGY: no syncope, no seizures, no numbness or tingling of hands, no numbness or tingling of feet, no paresis;    Objective   /74   Pulse 107   Temp 97 °F (36.1 °C) (Temporal)   Resp 24   Ht 5' 3\" (1.6 m)   Wt 170 lb 8 oz (77.3 kg)   SpO2 94%   BMI 30.20 kg/m²     PHYSICAL EXAM:  CONSTITUTIONAL: Alert, appropriate, no acute distress, ill-appearing  EYES: Non icteric, EOM intact, pupils equal round   ENT: Mucus membranes moist, no oral pharyngeal lesions, external inspection of ears and nose are normal  NECK: Supple, no masses. No palpable thyroid mass  CHEST/LUNGS: Decreased breath sounds left lower lobe   CARDIOVASCULAR: Tachycardic, no murmurs. No lower extremity edema  ABDOMEN: soft non-tender, active bowel sounds, no HSM. No palpable masses   EXTREMITIES: warm, full ROM in all 4 extremities, no focal weakness. SKIN: warm, dry with no rashes or lesions  LYMPH: No cervical, clavicular, axillary, or inguinal lymphadenopathy  NEUROLOGIC: follows commands, non focal   PSYCH: mood and affect appropriate.  Alert and oriented to time, place, person        LABORATORY RESULTS REVIEWED/ANALYZED BY ME:  Recent Labs     05/27/21  0228 05/26/21  0240 05/25/21  0150   WBC 11.8* 10.7 11.6*   HGB 10.1* 10.9* 11.1*   HCT 32.0* 34.3* 35.3*   MCV 97.6 97.7 97.8   * 430* 475*       Lab Results   Component Value Date     (L) 05/27/2021    K 3.6 05/27/2021    CL 99 05/27/2021    CO2 23 05/27/2021    BUN 9 05/27/2021    CREATININE 0.5 05/27/2021    GLUCOSE 96 05/27/2021    CALCIUM 7.9 (L) 05/27/2021    PROT 6.6 05/26/2021    LABALBU 2.1 (L) 05/26/2021    BILITOT 0.4 05/26/2021    ALKPHOS 94 05/26/2021    AST 13 05/26/2021    ALT <5 (A) 05/26/2021    LABGLOM >60 05/27/2021    GFRAA >59 05/27/2021    AGRATIO 1.8 06/04/2018    GLOB 2.4 06/04/2018       Lab Results   Component Value Date    INR 1.12 05/23/2021    INR 0.91 10/29/2018    PROTIME 14.4 05/23/2021    PROTIME 12.2 10/29/2018       RADIOLOGY STUDIES REPORT/REVIEWED AND INTERPRETED BY ME:  XR CHEST (2 VW)    Result Date: 5/17/2021  EXAMINATION: XR CHEST (2 VW) 5/17/2021 1:21 PM HISTORY: Chest congestion COMPARISON: 10/30/2018, 10/13/2018 FINDINGS: There are airspace opacities throughout the left lung with more focal consolidation left lung base. A small layering left pleural effusion is also evident. The right lung remains clear. The heart and mediastinal contours are normal. Pulmonary vasculature appears grossly normal. There has been prior fusion of the lower cervical spine. Multifocal pneumonia on the left with probable reactive pleural effusion. Follow-up PA and lateral chest radiograph is recommended in 6-8 weeks to document resolution. Signed by Dr Juan Miguel Kwon on 5/17/2021 1:23 PM    CT HEAD WO CONTRAST    Result Date: 5/26/2021  EXAMINATION: CT HEAD WO CONTRAST 5/26/2021 3:24 PM HISTORY: Altered mental status, difficulty with  strength. History of SLE. DOSE: 757 mGycm.  Automatic exposure control was utilized in an effort to use as little radiation as pleura more pronounced medially and posteriorly in the left chest no significant change since the previous study. A nodular infiltrate in the left lower lobe, predominantly in the superior segment of the left lower lobe persists. The remaining lungs are well-expanded. There is a persistent mediastinal lymphadenopathy. No change. The hilar lymph nodes are not evaluated due to lack of contrast enhancement. There is moderate lobulated fullness of the left hilum which is similar to the previous study. The possibility of left hilar lymphadenopathy is not excluded. Atheromatous changes of the thoracic aorta are seen. No aneurysmal dilatation. There is moderate diffuse dilatation of the main pulmonary artery and branches. The limited visualized coronary arteries are unremarkable without significant atheromatous changes. The unenhanced liver and spleen appear unremarkable. The gallbladder is moderately distended with a layer of high density/sludge. No calculi. There are degenerative glands are normal. A nonobstructing calculus in the right kidney is reidentified. Moderately prominent celiac lymph nodes are similar to the previous study. A focal soft tissue thickening in the left upper abdomen laterally may represent extension of the pleural thickening/processes? . The images reviewed in bone window show no acute bony abnormality or a focal bony lesion. The hardware fusion of cervical spine is incompletely visualized. The interval significant decrease in the left-sided pleural effusion as detailed above. Multifocal nodular thickening of the pleura is similar to the previous study and may represent a neoplastic process. Evidence of mediastinal lymphadenopathy. Question left hilar lymphadenopathy. Proximal abdominal/celiac lymphadenopathy. Persistent nodular infiltrate in the left lower lobe superior segment. A nonobstructing right renal calculus.  Signed by Dr Fede Whittington on 5/24/2021 7:35 AM    XR CHEST PORTABLE    Result Date: 5/25/2021  Exam: XR CHEST PORTABLE - 5/25/2021 8:46 AM Indication: Assess pleural effusion prior to chest tube removal Comparison: 5/23/2021 Findings: Stable position of left-sided pleural drain. No change in trace residual LEFT pleural effusion and LEFT lower lobe atelectasis. No right-sided pleural effusion. No visible pneumothorax. Cardiac silhouette is stable. Partially imaged cervical spine fusion hardware. Impression: No significant change in trace LEFT pleural effusion and LEFT lower lobe atelectasis. Signed by Dr Дмитрий Vinson on 5/25/2021 11:18 AM    XR CHEST PORTABLE    Result Date: 5/23/2021  EXAM: XR CHEST PORTABLE -- 5/23/2021 1:11 PM HISTORY: 55 years, Female, status post chest tube placement COMPARISON: 5/23/2021 TECHNIQUE:  1 images. Frontal view of the chest. FINDINGS:  No pneumothorax. Interval placement left small bore chest tube. Decreased left pleural fluid. Mild diffuse hazy opacity of the left lung persists. Right lung clear. Cardiac and mediastinal silhouette within normal limits. Fixation hardware at the cervical spine. No acute bony finding. 1. Interval placement small bore left chest tube. Decreased left pleural effusion. Improved aeration of the left lung, which has persistent mild diffuse hazy opacity. Signed by Dr Zhanna Vieira on 5/23/2021 2:27 PM    XR CHEST PORTABLE    Result Date: 5/23/2021  EXAM: XR CHEST PORTABLE -- 5/23/2021 6:10 AM HISTORY: 55 years, Female, shortness of air COMPARISON: 5/17/2021 TECHNIQUE:  1 images. Frontal view of the chest. FINDINGS:  No pneumothorax. Right lung clear. Increased left basilar opacity and effusion with patchy upper lung opacities. Cardiac silhouette obscured by pulmonary opacity. Upper mediastinum within normal limits. Lower cervical spine fixation hardware. No acute bony finding. 1. Increased left basilar opacity and effusion compared to 5/17/2021. Similar patchy left upper lung opacities.  Signed by  Isacc Code on 5/23/2021 7:20 AM    CTA PULMONARY W CONTRAST    Result Date: 5/23/2021  EXAM: CTA PULMONARY W CONTRAST -- 5/23/2021 7:18 AM HISTORY: 55 years, Female, large effusion, low oxygen saturation COMPARISON: 10/30/2018, chest radiograph 5/23/2021 DLP: 415 mGy cm. Automated exposure control was utilized to minimize patient radiation dose. TECHNIQUE: Enhanced axial CT images obtained. 3-D postprocessing, including MIPS, performed and images saved to PACS. FINDINGS: AIRWAYS/PULMONARY PARENCHYMA: The central airways are midline and patent. Emphysematous changes. Right lung clear. No right pleural effusion. Left lung with compressive atelectasis at the lung base. Patchy consolidative and groundglass opacities with left hilar adenopathy and pleural nodularity. Large left pleural effusion. VASCULATURE: Contrast bolus is adequate. There are no pulmonary arterial intraluminal filling defects. Normal pulmonary artery caliber. Thoracic aorta is normal in course and caliber. CARDIAC:  Cardiac size is normal.  No pericardial effusion. MEDIASTINUM: Left hilar, AP window and left paratracheal lymphadenopathy. For instance, left paratracheal lymph node measures up to 1.3 cm in short axis on axial series 2, image 46. Esophagus has normal coarse, caliber and wall thickness. EXTRATHORACIC: The visualized portions of the thyroid gland are unremarkable. No thoracic inlet or axillary adenopathy. The extrathoracic soft tissues are within normal limits. INCLUDED UPPER ABDOMEN: Focal fatty infiltration of the liver adjacent to the falciform ligament. Arterial phase of imaging limits evaluation. The gallbladder, pancreas, spleen, adrenal glands appear within normal limits. Left retroperitoneal lymph node measures up to 1.2 cm in short axis on axial series 2, image 124. Nonobstructing right renal calculi. No hydronephrosis. OSSEOUS: No acute or suspicious bony finding. 1. Large left pleural effusion with pleural nodularity. Patchy left lung consolidative and groundglass opacities with left lower lobe atelectasis. Left hilar and mediastinal adenopathy. Overall, appearance is most concerning for malignancy. Malignancy with superimposed infection would also be a consideration. 2. Left retroperitoneal lymphadenopathy. 3. Nonobstructing right renal calculi. Signed by Dr Mannie Bhandari on 5/23/2021 9:03 AM          My interpretation:Frontal/pariental development of a  lytic lesion  Left pleural effusion with left pleural thickening and nodularity. ASSESSMENT:  #Lytic lesions-differential diagnosis include metastatic malignancy such as lung cancer versus most myeloma versus others. Myeloma studies requested. Bone survey. I really think this is more likely a metastatic bone disease giving the current pulmonary findings. #Normocytic anemia-  Hb 10.1  B12 >2000   Studies today disease interventional    #Thrombocytosis-likely reactive    #Neutrophilic leukocytosis-likely reactive    #Left pleural effusion-concern for malignancy. Await results of cytology. Pulmonary following. Consider CT surgery consultation for evaluation of pleuroscopy/decortication    #Headaches-CT head negative. MRI brain requested. PLAN:  MRI brain pending  Please consider CT surgery consultation  Follow-up cytology from left pleural fluid  Myeloma studies requested  Bone survey    I have seen, examined and reviewed this patient medication list, appropriate labs and imaging studies. I reviewed relevant medical records and others physicians notes. I discussed the plans of care with the patient. I answered all the questions to the patients satisfaction. I have also reviewed the chief complaint (CC) and part of the history (History of Present Illness (HPI), Past Family Social History [facility]), or Review of Systems (ROS) and made changes when appropriated.        (Please note that portions of this note were completed with a voice recognition program. Efforts

## 2021-05-27 NOTE — FLOWSHEET NOTE
05/27/21 1434   Encounter Summary   Services provided to: Patient   Support System Children;Family members   Complexity of Encounter Moderate   Length of Encounter 15 minutes   Spiritual/Restorationism   Type Spiritual struggle   Assessment Coping     Attempted further discussions to complete LW. Paris Monahan didn't arouse to calling of her name. Her nurse agreed to call me when patient is available.    Electronically signed by Aileen Kwon on 5/27/2021 at 2:40 PM

## 2021-05-28 ENCOUNTER — TELEPHONE (OUTPATIENT)
Dept: INTERNAL MEDICINE CLINIC | Age: 56
End: 2021-05-28

## 2021-05-28 VITALS
SYSTOLIC BLOOD PRESSURE: 100 MMHG | RESPIRATION RATE: 18 BRPM | WEIGHT: 170.5 LBS | HEART RATE: 90 BPM | HEIGHT: 63 IN | BODY MASS INDEX: 30.21 KG/M2 | TEMPERATURE: 96.3 F | DIASTOLIC BLOOD PRESSURE: 69 MMHG | OXYGEN SATURATION: 92 %

## 2021-05-28 LAB
ANION GAP SERPL CALCULATED.3IONS-SCNC: 10 MMOL/L (ref 7–19)
BLOOD CULTURE, ROUTINE: NORMAL
BUN BLDV-MCNC: 10 MG/DL (ref 6–20)
CALCIUM SERPL-MCNC: 8.4 MG/DL (ref 8.6–10)
CHLORIDE BLD-SCNC: 101 MMOL/L (ref 98–111)
CO2: 26 MMOL/L (ref 22–29)
CREAT SERPL-MCNC: 0.5 MG/DL (ref 0.5–0.9)
CULTURE, BLOOD 2: NORMAL
EKG P AXIS: 50 DEGREES
EKG P-R INTERVAL: 146 MS
EKG Q-T INTERVAL: 336 MS
EKG QRS DURATION: 74 MS
EKG QTC CALCULATION (BAZETT): 416 MS
EKG T AXIS: 47 DEGREES
GFR AFRICAN AMERICAN: >59
GFR NON-AFRICAN AMERICAN: >60
GLUCOSE BLD-MCNC: 91 MG/DL (ref 74–109)
GLUCOSE BLD-MCNC: 92 MG/DL (ref 70–99)
HCT VFR BLD CALC: 34.5 % (ref 37–47)
HEMOGLOBIN: 10.9 G/DL (ref 12–16)
MCH RBC QN AUTO: 31.2 PG (ref 27–31)
MCHC RBC AUTO-ENTMCNC: 31.6 G/DL (ref 33–37)
MCV RBC AUTO: 98.9 FL (ref 81–99)
PDW BLD-RTO: 12.4 % (ref 11.5–14.5)
PERFORMED ON: NORMAL
PLATELET # BLD: 476 K/UL (ref 130–400)
PMV BLD AUTO: 8.8 FL (ref 9.4–12.3)
POTASSIUM REFLEX MAGNESIUM: 4.1 MMOL/L (ref 3.5–5)
RBC # BLD: 3.49 M/UL (ref 4.2–5.4)
SODIUM BLD-SCNC: 137 MMOL/L (ref 136–145)
WBC # BLD: 10.8 K/UL (ref 4.8–10.8)

## 2021-05-28 PROCEDURE — 80048 BASIC METABOLIC PNL TOTAL CA: CPT

## 2021-05-28 PROCEDURE — 6360000002 HC RX W HCPCS: Performed by: HOSPITALIST

## 2021-05-28 PROCEDURE — 85027 COMPLETE CBC AUTOMATED: CPT

## 2021-05-28 PROCEDURE — 6370000000 HC RX 637 (ALT 250 FOR IP): Performed by: PHYSICIAN ASSISTANT

## 2021-05-28 PROCEDURE — 99233 SBSQ HOSP IP/OBS HIGH 50: CPT | Performed by: PHYSICIAN ASSISTANT

## 2021-05-28 PROCEDURE — 99232 SBSQ HOSP IP/OBS MODERATE 35: CPT | Performed by: INTERNAL MEDICINE

## 2021-05-28 PROCEDURE — 2580000003 HC RX 258: Performed by: HOSPITALIST

## 2021-05-28 PROCEDURE — 99233 SBSQ HOSP IP/OBS HIGH 50: CPT | Performed by: PSYCHIATRY & NEUROLOGY

## 2021-05-28 PROCEDURE — 6370000000 HC RX 637 (ALT 250 FOR IP): Performed by: PSYCHIATRY & NEUROLOGY

## 2021-05-28 PROCEDURE — 6360000002 HC RX W HCPCS: Performed by: PSYCHIATRY & NEUROLOGY

## 2021-05-28 PROCEDURE — 82947 ASSAY GLUCOSE BLOOD QUANT: CPT

## 2021-05-28 PROCEDURE — 6370000000 HC RX 637 (ALT 250 FOR IP): Performed by: HOSPITALIST

## 2021-05-28 PROCEDURE — 92507 TX SP LANG VOICE COMM INDIV: CPT

## 2021-05-28 PROCEDURE — 36415 COLL VENOUS BLD VENIPUNCTURE: CPT

## 2021-05-28 PROCEDURE — 92526 ORAL FUNCTION THERAPY: CPT

## 2021-05-28 PROCEDURE — 94640 AIRWAY INHALATION TREATMENT: CPT

## 2021-05-28 RX ORDER — AZITHROMYCIN 500 MG/1
500 TABLET, FILM COATED ORAL DAILY
Qty: 3 TABLET | Refills: 0 | Status: SHIPPED | OUTPATIENT
Start: 2021-05-28 | End: 2021-05-31

## 2021-05-28 RX ORDER — MONTELUKAST SODIUM 10 MG/1
10 TABLET ORAL NIGHTLY
Qty: 30 TABLET | Refills: 3 | Status: SHIPPED | OUTPATIENT
Start: 2021-05-28

## 2021-05-28 RX ORDER — GABAPENTIN 300 MG/1
300 CAPSULE ORAL 3 TIMES DAILY
Qty: 90 CAPSULE | Refills: 0 | Status: SHIPPED | OUTPATIENT
Start: 2021-05-28 | End: 2021-06-27

## 2021-05-28 RX ORDER — THIAMINE MONONITRATE (VIT B1) 100 MG
100 TABLET ORAL DAILY
Qty: 30 TABLET | Refills: 3 | Status: SHIPPED | OUTPATIENT
Start: 2021-05-28

## 2021-05-28 RX ORDER — HYDROCODONE BITARTRATE AND ACETAMINOPHEN 5; 325 MG/1; MG/1
1 TABLET ORAL EVERY 6 HOURS PRN
Qty: 12 TABLET | Refills: 0 | Status: SHIPPED | OUTPATIENT
Start: 2021-05-28 | End: 2021-05-28 | Stop reason: HOSPADM

## 2021-05-28 RX ORDER — CEFDINIR 300 MG/1
300 CAPSULE ORAL 2 TIMES DAILY
Qty: 14 CAPSULE | Refills: 0 | Status: ON HOLD | OUTPATIENT
Start: 2021-05-28 | End: 2021-06-06 | Stop reason: HOSPADM

## 2021-05-28 RX ORDER — TIZANIDINE 2 MG/1
2 TABLET ORAL 3 TIMES DAILY
Qty: 30 TABLET | Refills: 0 | Status: SHIPPED | OUTPATIENT
Start: 2021-05-28

## 2021-05-28 RX ORDER — PROPRANOLOL HYDROCHLORIDE 10 MG/1
10 TABLET ORAL 2 TIMES DAILY
Qty: 90 TABLET | Refills: 3 | Status: SHIPPED | OUTPATIENT
Start: 2021-05-28

## 2021-05-28 RX ORDER — PSEUDOEPHEDRINE HCL 30 MG
100 TABLET ORAL DAILY
Qty: 30 CAPSULE | Refills: 0 | Status: SHIPPED | OUTPATIENT
Start: 2021-05-29 | End: 2021-06-28

## 2021-05-28 RX ADMIN — GABAPENTIN 300 MG: 300 CAPSULE ORAL at 08:36

## 2021-05-28 RX ADMIN — HYDROCODONE BITARTRATE AND ACETAMINOPHEN 2 TABLET: 5; 325 TABLET ORAL at 04:01

## 2021-05-28 RX ADMIN — THIAMINE HYDROCHLORIDE 100 MG: 100 INJECTION, SOLUTION INTRAMUSCULAR; INTRAVENOUS at 08:36

## 2021-05-28 RX ADMIN — TOPIRAMATE 50 MG: 50 TABLET, FILM COATED ORAL at 08:36

## 2021-05-28 RX ADMIN — PANTOPRAZOLE SODIUM 40 MG: 40 TABLET, DELAYED RELEASE ORAL at 06:59

## 2021-05-28 RX ADMIN — DOCUSATE SODIUM 100 MG: 100 CAPSULE, LIQUID FILLED ORAL at 08:36

## 2021-05-28 RX ADMIN — BUSPIRONE HYDROCHLORIDE 10 MG: 10 TABLET ORAL at 08:36

## 2021-05-28 RX ADMIN — PROPRANOLOL HYDROCHLORIDE 10 MG: 10 TABLET ORAL at 08:36

## 2021-05-28 RX ADMIN — CEFEPIME HYDROCHLORIDE 2000 MG: 2 INJECTION, POWDER, FOR SOLUTION INTRAVENOUS at 08:36

## 2021-05-28 RX ADMIN — IPRATROPIUM BROMIDE 0.5 MG: 0.5 SOLUTION RESPIRATORY (INHALATION) at 06:42

## 2021-05-28 RX ADMIN — HYDROCODONE BITARTRATE AND ACETAMINOPHEN 2 TABLET: 5; 325 TABLET ORAL at 08:46

## 2021-05-28 RX ADMIN — ATORVASTATIN CALCIUM 40 MG: 40 TABLET, FILM COATED ORAL at 08:37

## 2021-05-28 RX ADMIN — CEFEPIME HYDROCHLORIDE 2000 MG: 2 INJECTION, POWDER, FOR SOLUTION INTRAVENOUS at 01:29

## 2021-05-28 RX ADMIN — CITALOPRAM HYDROBROMIDE 40 MG: 20 TABLET ORAL at 08:36

## 2021-05-28 RX ADMIN — IPRATROPIUM BROMIDE 0.5 MG: 0.5 SOLUTION RESPIRATORY (INHALATION) at 10:56

## 2021-05-28 RX ADMIN — VANCOMYCIN HYDROCHLORIDE 1000 MG: 10 INJECTION, POWDER, LYOPHILIZED, FOR SOLUTION INTRAVENOUS at 08:36

## 2021-05-28 RX ADMIN — Medication 10 ML: at 08:37

## 2021-05-28 ASSESSMENT — ENCOUNTER SYMPTOMS
VOICE CHANGE: 0
ABDOMINAL PAIN: 0
BACK PAIN: 0
ABDOMINAL DISTENTION: 0
COUGH: 1
NAUSEA: 0
TROUBLE SWALLOWING: 0
SHORTNESS OF BREATH: 1
EYE ITCHING: 0
WHEEZING: 0
VOMITING: 0
EYE DISCHARGE: 0

## 2021-05-28 ASSESSMENT — PAIN SCALES - GENERAL
PAINLEVEL_OUTOF10: 8
PAINLEVEL_OUTOF10: 7

## 2021-05-28 NOTE — PROGRESS NOTES
Palliative Care Progress Note  5/28/2021 11:17 AM    Patient:  Darrius Blackwood  YOB: 1965  Primary Care Physician: JERILYN Roy  Advance Directive: DNR-[id number]  Admit Date: 5/23/2021       Hospital Day: 5  Portions of this note have been copied forward, however, changed to reflect the most current clinical status of this patient. CHIEF COMPLAINT/REASON FOR CONSULTATION Goals of care, code status, family support    SUBJECTIVE: Ms. Carlos Enrique Hall has no new complaints today. She states pain is well controlled. Dyspnea is improved. She is anxious to discharge home. Would like to see if she qualifies for oxygen and would like home health. She will follow up with Dr. Calos Mauricio as outpatient for results of pathology report and will defer decision on treatment plan until that time. Interval History:   Ms. Carlos Enrique Hall is a 47 yo female with PMH lupus, anxiety, chronic migraines who was admitted to Hospitalist after she presented in respiratory distress. A large left pleural effusion was seen on CTA. She underwent chest tube placement with fluid sent for cytology. Chest tube removed 05/25/2021 and patient was transferred to medical floor from ICU. She exhibited slurred speech, difficulty with left upper extremity  strength. Neurology was consulted. Elavil discontinued, neurontin decreased to 300 mg TID, CT head performed which revealed lytic lesions. Oncology consulted. EEG unremarkable. MRI without acute intracranial pathology though did also reveal lytic lesions. Bone scan confirming skull lytic lesions though without additional lesions revealed. Review of Systems:   14 point review of systems is negative except as specifically addressed above.     Objective:   VITALS:  /69   Pulse 90   Temp 96.3 °F (35.7 °C)   Resp 18   Ht 5' 3\" (1.6 m)   Wt 170 lb 8 oz (77.3 kg)   SpO2 92%   BMI 30.20 kg/m²   24HR INTAKE/OUTPUT:      Intake/Output Summary (Last 24 hours) at 5/28/2021 1117  Last data filed at

## 2021-05-28 NOTE — CARE COORDINATION
CARLOTA faxed orders for oxygen and walker to [facility] for delivery to room  [facility]   P  [phone] F  Electronically signed by IMELDA Gordon on 5/28/2021 at 11:27 AM

## 2021-05-28 NOTE — DISCHARGE SUMMARY
Discharge Summary      Rebekah Nagel  :  1965  MRN:  [id number]    Admit date:  2021  Discharge date:      Admitting Physician:  No admitting provider for patient encounter. Advance Directive: DNR-CC    Consults: pulmonary/intensive care, hematology/oncology and neurology    Primary Care Physician:  JERILYN Meredith    Discharge Diagnoses:  Principal Problem:    Acute respiratory failure with hypoxia (HCC)  Active Problems:    Chronic constipation    Heartburn    Chronic GERD    Depression    Chronic tension-type headache, intractable    Cervical radiculopathy    Anxiety    Nausea and vomiting    Esophageal dysphagia    Large pleural effusion    S/P chest tube placement (HCC)    Left lower lobe pneumonia    Hilar lymphadenopathy    Tobacco abuse counseling    Cigarette nicotine dependence with nicotine-induced disorder    Palliative care patient    Abnormal CT scan of head    Weakness of both hands  Resolved Problems:    * No resolved hospital problems. *      Significant Diagnostic Studies:   CT CHEST WO CONTRAST    Result Date: 2021  Examination. CT CHEST WO CONTRAST 2021 4:40 AM History: Follow-up after chest tube placement. DLP: 881 mGycm. A CT scan of the chest is performed without intravenous contrast enhancement. The images are acquired in axial plane with subsequent reconstruction in coronal and sagittal planes. The comparison is made with the previous study dated 2021. There is interval bone graft chest tube placement. There is significant improvement in the left-sided pleural effusion. Small residual pleural effusion is seen in the left lower chest. There is also small loculated fluid in the left lower chest posteriorly including the left major fissure. There is nodular thickening of the pleura more pronounced medially and posteriorly in the left chest no significant change since the previous study.  A nodular infiltrate in the left lower lobe, predominantly in the superior segment of the left lower lobe persists. The remaining lungs are well-expanded. There is a persistent mediastinal lymphadenopathy. No change. The hilar lymph nodes are not evaluated due to lack of contrast enhancement. There is moderate lobulated fullness of the left hilum which is similar to the previous study. The possibility of left hilar lymphadenopathy is not excluded. Atheromatous changes of the thoracic aorta are seen. No aneurysmal dilatation. There is moderate diffuse dilatation of the main pulmonary artery and branches. The limited visualized coronary arteries are unremarkable without significant atheromatous changes. The unenhanced liver and spleen appear unremarkable. The gallbladder is moderately distended with a layer of high density/sludge. No calculi. There are degenerative glands are normal. A nonobstructing calculus in the right kidney is reidentified. Moderately prominent celiac lymph nodes are similar to the previous study. A focal soft tissue thickening in the left upper abdomen laterally may represent extension of the pleural thickening/processes? . The images reviewed in bone window show no acute bony abnormality or a focal bony lesion. The hardware fusion of cervical spine is incompletely visualized. The interval significant decrease in the left-sided pleural effusion as detailed above. Multifocal nodular thickening of the pleura is similar to the previous study and may represent a neoplastic process. Evidence of mediastinal lymphadenopathy. Question left hilar lymphadenopathy. Proximal abdominal/celiac lymphadenopathy. Persistent nodular infiltrate in the left lower lobe superior segment. A nonobstructing right renal calculus. Signed by Dr Abhay Vergara on 5/24/2021 7:35 AM    XR CHEST PORTABLE    Result Date: 5/23/2021  EXAM: XR CHEST PORTABLE -- 5/23/2021 1:11 PM HISTORY: 55 years, Female, status post chest tube placement COMPARISON: 5/23/2021 TECHNIQUE:  1 images.   Frontal view of the VASCULATURE: Contrast bolus is adequate. There are no pulmonary arterial intraluminal filling defects. Normal pulmonary artery caliber. Thoracic aorta is normal in course and caliber. CARDIAC:  Cardiac size is normal.  No pericardial effusion. MEDIASTINUM: Left hilar, AP window and left paratracheal lymphadenopathy. For instance, left paratracheal lymph node measures up to 1.3 cm in short axis on axial series 2, image 46. Esophagus has normal coarse, caliber and wall thickness. EXTRATHORACIC: The visualized portions of the thyroid gland are unremarkable. No thoracic inlet or axillary adenopathy. The extrathoracic soft tissues are within normal limits. INCLUDED UPPER ABDOMEN: Focal fatty infiltration of the liver adjacent to the falciform ligament. Arterial phase of imaging limits evaluation. The gallbladder, pancreas, spleen, adrenal glands appear within normal limits. Left retroperitoneal lymph node measures up to 1.2 cm in short axis on axial series 2, image 124. Nonobstructing right renal calculi. No hydronephrosis. OSSEOUS: No acute or suspicious bony finding. 1. Large left pleural effusion with pleural nodularity. Patchy left lung consolidative and groundglass opacities with left lower lobe atelectasis. Left hilar and mediastinal adenopathy. Overall, appearance is most concerning for malignancy. Malignancy with superimposed infection would also be a consideration. 2. Left retroperitoneal lymphadenopathy. 3. Nonobstructing right renal calculi.  Signed by Dr Ricki Cisse on 5/23/2021 9:03 AM      Pertinent Labs:   CBC:   Recent Labs     05/26/21  0240 05/27/21 0228 05/28/21  0225   WBC 10.7 11.8* 10.8   HGB 10.9* 10.1* 10.9*   * 471* 476*     BMP:    Recent Labs     05/26/21  0240 05/27/21 0228 05/28/21  0225    135* 137   K 3.8 3.6 4.1    99 101   CO2 27 23 26   BUN 9 9 10   CREATININE 0.6 0.5 0.5   GLUCOSE 94 96 91     INR:   Recent Labs     05/27/21  0921   INR 1.24*     ABGs:No results for input(s): PH, PO2, PCO2, HCO3, BE, O2SAT in the last 72 hours. Lactic Acid:No results for input(s): LACTA in the last 72 hours. Procedures:     Chest tube placement  CT-guided biopsy of left pleural masslike thickening    Hospital Course: 52-year-old obese female with a past medical history of lupus, anxiety, migraines presenting to the hospital for dyspnea found to have large left-sided pleural effusion with pleural nodularity concerning for a malignancy with patient placed in MICU with pulmonology consulted and chest tube placed with drainage of left pleural fluid. Patient also status post CT-guided biopsy of left pleural masslike thickening performed 5/27/2021. Oncology on board and pathology results still pending. Patient noted to have lytic lesions on CT head with some intermittent confusion with neurology consulted. EEG showing findings of encephalopathy and MRI brain showing enhancing skull lesion suspicious for osseous metastasis. Patient is currently alert and oriented and still requiring oxygen to maintain saturations but would like to go home at this time. Palliative care has had discussions with the patient and she wishes to be DNR/DNI knowing that she likely has metastatic cancer. Patient is currently hemodynamically stable for discharge home today to follow-up with PCP, oncology, neurology, pulmonology as an outpatient. Home health and a rolling walker has been ordered for the patient. Home O2 screening will be performed prior to discharge today.     Physical Exam:  Vitals: /69   Pulse 90   Temp 96.3 °F (35.7 °C)   Resp 18   Ht 5' 3\" (1.6 m)   Wt 170 lb 8 oz (77.3 kg)   SpO2 92%   BMI 30.20 kg/m²   24HR INTAKE/OUTPUT:      Intake/Output Summary (Last 24 hours) at 5/28/2021 1209  Last data filed at 5/28/2021 1008  Gross per 24 hour   Intake 680 ml   Output 300 ml   Net 380 ml     General appearance: alert and cooperative with exam  HEENT: AT/NC  Lungs: no increased work of breathing, diminished breath sounds bilaterally, no wheezing appreciated  Heart: RRR, no murmurs appreciated  Abdomen: BS+, soft, NT, ND  Extremities: no edema, pulses+  Neurologic: AAOx3 (person, place, time), gross motor function intact  Skin: warm    Discharge Medications:       Claudia Mcintosh   Home Medication Instructions NNQ:[id number]    Printed on:05/28/21 7559   Medication Information                      azithromycin (ZITHROMAX) 500 MG tablet  Take 1 tablet by mouth daily for 3 days             busPIRone (BUSPAR) 10 MG tablet  Take 1 tablet by mouth twice daily             cefdinir (OMNICEF) 300 MG capsule  Take 1 capsule by mouth 2 times daily for 7 days             citalopram (CELEXA) 40 MG tablet  Take 1 tablet by mouth once daily             clonazePAM (KLONOPIN) 0.5 MG tablet  Take 0.5 mg by mouth 2 times daily as needed. diclofenac sodium 1 % GEL  Apply 2 g topically 4 times daily             docusate sodium (COLACE, DULCOLAX) 100 MG CAPS  Take 100 mg by mouth daily             fluticasone (FLONASE) 50 MCG/ACT nasal spray  USE 1 SPRAY(S) IN EACH NOSTRIL ONCE DAILY             gabapentin (NEURONTIN) 300 MG capsule  Take 1 capsule by mouth 3 times daily for 30 days.              Galcanezumab-gnlm (EMGALITY) 120 MG/ML SOAJ  1 injection every 30 days             ipratropium (ATROVENT HFA) 17 MCG/ACT inhaler  Inhale 1 puff into the lungs 3 times daily             montelukast (SINGULAIR) 10 MG tablet  Take 1 tablet by mouth nightly             omeprazole (PRILOSEC) 40 MG delayed release capsule  TAKE ONE CAPSULE BY MOUTH ONCE DAILY             ondansetron (ZOFRAN ODT) 4 MG disintegrating tablet  Take 1 tablet by mouth every 8 hours as needed for Nausea or Vomiting             propranolol (INDERAL) 10 MG tablet  Take 1 tablet by mouth 2 times daily             rOPINIRole (REQUIP) 0.25 MG tablet  Take 1 tablet by mouth nightly             simvastatin (ZOCOR) 10 MG tablet  Take 1 tablet

## 2021-05-28 NOTE — PROGRESS NOTES
chest tube placed due to large pleural effusion which is draining well. Patient is symptomatically improving. Pulmonary is awaiting cytology on the pleural effusion fluid to determine whether it is tumor/infectious etiology before consulting oncology. 5/23/2021  HISTORY OF PRESENT ILLNESS:   The patient is a 48 y.o. female presents with worsening shortness of breath. She has not felt well for the last 4 weeks. She was recently started on antibiotics as an outpatient for left-sided pneumonia. Her breathing got significantly worse this morning prompting her to come into the emergency department. She had a CT angiogram of the chest as part of her work-up. This was negative for PE, unfortunately, it appears as though that she may have metastatic lung carcinoma with a very large left-sided pleural effusion. I did notify her of these findings. We will get pulmonology and oncology on board. I feel that she will most likely also need a left-sided chest tube for drainage. She is obviously distraught at the news.       REVIEW  OF  SYSTEMS:    Constitutional:  No fevers, chills, nausea, vomiting, + tiredness and fatigue   Lungs:   No hemoptysis, pleurisy, cough, +SOB, + left-sided chest tube in place   Heart:  No chest pressure with exertion, palpitations,    Abdomen:   No new masses, no bright red blood per rectum   Extremities:  + difficulty ambulation due to weakness, no new lesions   Neurologic: No new motor or sensory changes, ++ headaches, + weakness of  in both hands       PAST MEDICAL HISTORY:  Past Medical History:   Diagnosis Date    Anxiety     Arthritis     Chronic tension-type headache, intractable 1/8/2016    Depression     Headache(784.0)     migraine    Lupus ([facility] [address]     Neck pain 1/8/2016         PAST SURGICAL HISTORY:  Past Surgical History:   Procedure Laterality Date    COLONOSCOPY  10/23/2013    Dr Ajay Salgado: internal hemorrhoids; hyperplastic polyps (5 yr recal for screening)    HYSTERECTOMY      NECK SURGERY  07/26/2016    Dr Luz Maria Sotelo FLX DX W/COLLJ SPEC WHEN PFRMD N/A 2/6/2017    COLONOSCOPY DIAGNOSTIC OR SCREENING performed by Philip Chowdhury DO at [address] [facility] OR    GA EGD TRANSORAL BIOPSY SINGLE/MULTIPLE N/A 7/26/2018    Dr Mandel-w/dilation over wire-[address] [place]-mild esophageal narrowing-Yas (-)    TUBAL LIGATION      TUMOR REMOVAL      right deltoid area    UPPER GASTROINTESTINAL ENDOSCOPY  11/06/2013    Dr Gibson Flanagan: normal    UPPER GASTROINTESTINAL ENDOSCOPY  7/26/2018    Dr Mandel-w/dilation over wire-[address] [place]-mild esophageal narrowing-Yas (-)        FAMILY HISTORY:  Family History   Problem Relation Age of Onset    Colon Cancer Maternal Aunt     Colon Polyps Maternal Aunt     Liver Cancer Neg Hx     Liver Disease Neg Hx     Rectal Cancer Neg Hx     Stomach Cancer Neg Hx            OBJECTIVE:  /66   Pulse 96   Temp 97.2 °F (36.2 °C) (Temporal)   Resp 20   Ht 5' 3\" (1.6 m)   Wt 170 lb 8 oz (77.3 kg)   SpO2 92%   BMI 30.20 kg/m²   No intake/output data recorded.     PHYSICAL  EXAMINATION:    SEN:  Awake, alert, oriented x 3, patient appears tired and fatigued   Head/Eyes:  Normocephalic, atraumatic, EOMI and PERRLA bilaterally   Respiratory:   Bilateral decreased air entry in both lung fields, mild B/L crackles, + left-sided chest tube in place   Cardiovascular:  Regular rate and rhythm, S1+S2+0, no murmurs/rubs   Abdomen:   Soft, non-tender, bowel sounds +ve, no organomegaly   Extremities: Moves all, decreased range of motion, no edema   Neurologic: Awake, alert, oriented x 3, cranial nerves II-XII intact, + weakened bilateral handgrips, sensory system intact   Psychiatric: Normal mood, non-suicidal       CURRENT MEDICATIONS:  Scheduled:   gabapentin  300 mg Oral TID    thiamine  100 mg Intravenous Daily    ipratropium  0.5 mg Nebulization 4x daily    docusate sodium  100 mg Oral Daily    nicotine  1 patch Transdermal Daily    vancomycin (VANCOCIN) intermittent dosing (placeholder)   Other RX Placeholder    rOPINIRole  0.25 mg Oral Nightly    pantoprazole  40 mg Oral QAM AC    propranolol  10 mg Oral BID    topiramate  50 mg Oral BID    citalopram  40 mg Oral Daily    busPIRone  10 mg Oral TID    atorvastatin  40 mg Oral Daily    sodium chloride flush  5-40 mL Intravenous 2 times per day    enoxaparin  40 mg Subcutaneous Q24H    cefepime  2,000 mg Intravenous Q8H    montelukast  10 mg Oral Nightly    vancomycin  1,000 mg Intravenous Q12H        PRN:  naloxone, 0.4 mg, PRN  HYDROcodone 5 mg - acetaminophen, 1 tablet, Q4H PRN   Or  HYDROcodone 5 mg - acetaminophen, 2 tablet, Q4H PRN  clonazePAM, 0.5 mg, BID PRN  ondansetron, 4 mg, Q8H PRN  sodium chloride flush, 10 mL, PRN  sodium chloride, 25 mL, PRN  promethazine, 12.5 mg, Q6H PRN   Or  ondansetron, 4 mg, Q6H PRN  polyethylene glycol, 17 g, Daily PRN  acetaminophen, 650 mg, Q6H PRN   Or  acetaminophen, 650 mg, Q6H PRN        Infusions:   sodium chloride         Laboratory Data:  Recent Labs     05/25/21  0150 05/26/21  0240 05/27/21  0228   WBC 11.6* 10.7 11.8*   HGB 11.1* 10.9* 10.1*   * 430* 471*     Recent Labs     05/25/21  0150 05/25/21  0336 05/26/21  0240 05/27/21  0228     --  136 135*   K 4.3 3.5 3.8 3.6     --  101 99   CO2 24  --  27 23   BUN 12  --  9 9   CREATININE 0.7  --  0.6 0.5   GLUCOSE 114*  --  94 96     Recent Labs     05/26/21  1753   AST 13   ALT <5*   BILITOT 0.4   ALKPHOS 94     Troponin T:   No results for input(s): TROPONINI in the last 72 hours. Pro-BNP: No results for input(s): BNP in the last 72 hours. INR:   Recent Labs     05/27/21  0921   INR 1.24*     UA:No results for input(s): NITRITE, COLORU, PHUR, LABCAST, WBCUA, RBCUA, MUCUS, TRICHOMONAS, YEAST, BACTERIA, CLARITYU, SPECGRAV, LEUKOCYTESUR, UROBILINOGEN, BILIRUBINUR, BLOODU, GLUCOSEU, AMORPHOUS in the last 72 hours.     Invalid input(s): KETONESU  A1C: No results for input(s): LABA1C in the last 72 hours. ABG:  Recent Labs     05/25/21  0336   PHART 7.390   GYF1SQB 42.0   PO2ART 77.0*   WCI8OGC 25.4   BEART 0.3   HGBAE 11.4*   Z0OZPNEW 94.1   CARBOXHGBART 1.8         Imaging:    CT CHEST WO CONTRAST    Result Date: 5/24/2021  The interval significant decrease in the left-sided pleural effusion as detailed above. Multifocal nodular thickening of the pleura is similar to the previous study and may represent a neoplastic process. Evidence of mediastinal lymphadenopathy. Question left hilar lymphadenopathy. Proximal abdominal/celiac lymphadenopathy. Persistent nodular infiltrate in the left lower lobe superior segment. A nonobstructing right renal calculus. Signed by Dr Sommer Lobo on 5/24/2021 7:35 AM    XR CHEST PORTABLE    Result Date: 5/23/2021  1. Interval placement small bore left chest tube. Decreased left pleural effusion. Improved aeration of the left lung, which has persistent mild diffuse hazy opacity. Signed by Dr Darwin Donaldson on 5/23/2021 2:27 PM    XR CHEST PORTABLE    Result Date: 5/23/2021  1. Increased left basilar opacity and effusion compared to 5/17/2021. Similar patchy left upper lung opacities. Signed by Dr Darwin Donaldson on 5/23/2021 7:20 AM    CTA PULMONARY W CONTRAST    Result Date: 5/23/2021  1. Large left pleural effusion with pleural nodularity. Patchy left lung consolidative and groundglass opacities with left lower lobe atelectasis. Left hilar and mediastinal adenopathy. Overall, appearance is most concerning for malignancy. Malignancy with superimposed infection would also be a consideration. 2. Left retroperitoneal lymphadenopathy. 3. Nonobstructing right renal calculi.  Signed by Dr Darwin Donaldson on 5/23/2021 9:03 AM       ASSESSMENT & PLAN:    Principal Problem:    Acute respiratory failure with hypoxia (HCC)  Active Problems:    Chronic constipation    Heartburn    Chronic GERD    Depression    Chronic tension-type headache, intractable    Cervical radiculopathy    Anxiety    Nausea and vomiting    Esophageal dysphagia    Large pleural effusion    S/P chest tube placement (HCC)    Left lower lobe pneumonia    Hilar lymphadenopathy    Tobacco abuse counseling    Cigarette nicotine dependence with nicotine-induced disorder    Palliative care patient    Abnormal CT scan of head    Weakness of both hands  Resolved Problems:    * No resolved hospital problems. *      Continue with current medication  Continue with IV antibiotics in the form of cefepime and vancomycin  Monitor cultures  Patient has mild leukocytosis  Drainage stopped from the left-sided chest tube hence it was discontinued  Pulmonary recommendations reviewed, appreciated and agreed with  Patient is complaining of headaches and bilateral hand weakness  CAT scan of the head was done which showed possible lytic lesions  Patient switched to p.o. pain medication to be used as needed  Continue with antianxiety medications as needed as well  Patient likely has lytic lesions in the head and possibly in the chest because of her chest pain, but we have no conclusive evidence yet  Follow-up on frozen section from the bronchoscopy sample to rule out malignancy  Oncology consult ordered evaluated the patient and ordered further work-up  5/25/2021 . .. spoke with patient's mother and sister from [place] in detail and answered all questions that they had  Palliative care consult given for close follow-up  Patient downgraded to medical floor after removal of chest tube  PT/OT evaluation given  Case management consult for DC planning      Chronic medical issues . .. Continue with home meds. Monitor patient closely while admitted. Advised very close f/u with patient's PCP as an outpatient to address chronic medical issues. Dependence on nicotine from cigarettes          Tobacco abuse counseling  Patient smokes cigarettes on a chronic basis. Strictly advised patient to cut down on or quit smoking.   Nicotine patch offered. ~5 minutes spent on tobacco cessation counseling with the patient. Websites - http://smokefree. gov & [employer]. com   °Quitlines - 1-800-QUIT-NOW (7-110-044-383-111-9491)   °Smokefree Apps - QuitSTART Derick   °Text Messages - SmokefreeTXT (send the word QUIT to 41320)     Repeat labs in a.m. Electrolyte replacement as per protocol. Patient will be monitored very closely on the floor. Further recommendations as per the hospital course. The patient's management will be taken over by my covering Hospitalist, Dr. Gudelia Troy, in a.m . .. I am signing off! Discharge Plan: To be determined as per the hospital course      Patient  is on DVT prophylaxis  Current medications reviewed  Lab work reviewed  Radiology/Chest x-ray films reviewed  Treatment recommendations from suspecialities reviewed, appreciated and agreed with  Discussed with the nurse and addressed all questions/concerns  Discussed with Patient and/or Family at the bedside in detail . .. they understand and agree with the management plan. I attest that I spend >35 minutes engaged in critical care of this patient. This includes review of EMR data, imaging, bedside evaluation, patient/family counseling and coordination of care with nursing, providers and consultants. Tristen Chang MD  5/27/2021 11:25 PM      DISCLAIMER: This note was created with electronic voice recognition which does have occasional errors. If you have any questions regarding the content within the note please do not hesitate to contact me. .. Thanks.

## 2021-05-28 NOTE — PROGRESS NOTES
Comprehensive Nutrition Assessment    Type and Reason for Visit:  Initial, RD Nutrition Re-Screen/LOS    Nutrition Recommendations/Plan: start ONS    Nutrition Assessment:  Pt is adequately nourished AEB fat and muscle mass. Aware SLP working with pt. Diet modified to soft and bite sized. Recommended mildly thickened liquids but family declines so regular liquids is being sent. PO intake remains poor with intake ranging 0-25% usually. Will try adding an ONS    Malnutrition Assessment:  Malnutrition Status:  No malnutrition    Context:  Acute Illness     Findings of the 6 clinical characteristics of malnutrition:  Energy Intake:  Mild decrease in energy intake (Comment)  Weight Loss:  No significant weight loss     Body Fat Loss:  No significant body fat loss     Muscle Mass Loss:  No significant muscle mass loss    Fluid Accumulation:  No significant fluid accumulation Extremities   Strength:  Not Performed    Nutrition Related Findings:  well nourished      Wounds:  Surgical Incision       Current Nutrition Therapies:    DIET DYSPHAGIA SOFT AND BITE-SIZED; Anthropometric Measures:  · Height: 5' 3\" (160 cm)  · Current Body Weight: 170 lb 8 oz (77.3 kg)   · Admission Body Weight: 178 lb (80.7 kg)    · Usual Body Weight: 168 lb (76.2 kg) (2/2021)     · Ideal Body Weight: 115 lbs; % Ideal Body Weight 148. 3 %   · BMI: 30.2  · Adjusted Body Weight:  ; No Adjustment   · Adjusted BMI:      · BMI Categories: Obese Class 1 (BMI 30.0-34. 9)       Nutrition Diagnosis:   · Increased nutrient needs related to increase demand for energy/nutrients as evidenced by wounds      Nutrition Interventions:   Food and/or Nutrient Delivery:  Continue Current Diet, Start Oral Nutrition Supplement  Nutrition Education/Counseling:  No recommendation at this time   Coordination of Nutrition Care:  Continue to monitor while inpatient    Goals:  po intake 50% or greater. weight stable.   Incision healing       Nutrition Monitoring and Evaluation:   Behavioral-Environmental Outcomes:  None Identified   Food/Nutrient Intake Outcomes:  Food and Nutrient Intake, Supplement Intake  Physical Signs/Symptoms Outcomes:  Biochemical Data, Chewing or Swallowing, Weight, Skin, Nutrition Focused Physical Findings, Nausea or Vomiting     Discharge Planning:     Too soon to determine     Electronically signed by Bita Ricks MS, RD, LD on 5/28/21 at 12:49 PM CDT    Contact: [phone]

## 2021-05-28 NOTE — PROGRESS NOTES
Speech Language Pathology  Facility/Department: [facility]/MED  SWALLOW THERAPY  SPEECH THERAPY     NAME: Nahomi eWber  : 1965  MRN: [id number]    ADMISSION DATE: 2021  ADMITTING DIAGNOSIS: has Rectal bleeding; Rectal pain; Lower abdominal pain; Chronic constipation; Heartburn; Uncomfortable fullness after meals; Chronic GERD; Benign colon polyp; Panic attack; Depression; Neck pain; Chronic tension-type headache, intractable; Cervical radiculopathy; Cervical disc disease; Hypoesthesia of skin; Pain in both upper extremities; Abdominal cramping; Difficult bowel movements; Anxiety; Internal hemorrhoid, bleeding; Epigastric pain; Nausea and vomiting; Esophageal dysphagia; Acute respiratory failure with hypoxia ([address] Large pleural effusion; S/P chest tube placement ([address] Left lower lobe pneumonia; Hilar lymphadenopathy; Tobacco abuse counseling; Cigarette nicotine dependence with nicotine-induced disorder; Palliative care patient; Abnormal CT scan of head; and Weakness of both hands on their problem list.    Date of Treat: 2021  Evaluating Therapist: DALTON Werner Mc    Current Diet level:  Soft and bite sized consistency with thin liquids    Reason for Referral  Nahomi Weber was referred for a bedside swallow evaluation to assess the efficiency of her swallow function, identify signs and symptoms of aspiration and make recommendations regarding safe dietary consistencies, effective compensatory strategies, and safe eating environment. Impression  Monitored patient's swallowing function. Patient exhibited slow oral prep of more solid consistencies as well as sluggish, mildly decreased laryngeal elevation for swallow airway protection.  Even so, no outward S/S penetration/aspiration was noted with any puree consistency presentation, soft and bite sized consistency presentation, or thin liquid presentation administered during treatment session this date.     At this time, would recommend continuation current diet. Assist during meals. Recommend meds whole in pudding/applesauce.      Treatment Plan  Requires SLP Intervention: Yes     Recommended Diet and Intervention  Diet Solids Recommendation: Soft and bite sized  Liquid Consistency Recommendation: Thin (per patient/family request)  Recommended Form of Meds: Meds in puree as able  Therapeutic Interventions: Patient/Family education;Diet tolerance monitoring     Compensatory Swallowing Strategies  Compensatory Swallowing Strategies: Upright as possible for all oral intake;ASSIST FEED;Small bites/sips;Eat/Feed slowly; Alternate solids and liquids; Remain upright for 30-45 minutes after meals     Treatment/Goals  Timeframe for Short-term Goals: 1x/day for 3 days   Goal 1: Re-assessment of swallow function. Goal 2: Patient staff will follow swallow safety recommendations to decrease risk of penetration/aspiration during PO intake. Goal 3: Monitor speech production. Goal 4: Cognitive-linguistic eval      General  Chart Reviewed: Yes  Behavior/Cognition: Alert; Cooperative  O2 Device: Nasal Cannula  Communication Observation: (Re-assessed patient's speech production. Patient exhibited slow, decreased lingual movements during verbalizations. SLP ranked functional intelligibility of speech for unfamiliar listeners at 80-90% in utterances with background noise present.)  Follows Directions: Simple   Dentition: Dentures  Patient Positioning: Upright in bed  Consistencies Administered: Dysphagia Pureed (Dysphagia I); Soft and bite sized; Thin - straw     Oral Motor Examination   Labial ROM: (Increased, bilaterally, during labial retraction trials and labial protrusion trials.)  Labial Strength: (Adequate during labial compression trials.)  Labial Coordination: (Faster volitional movements were noted.)  Lingual ROM: (Decreased during lingual extension trials with no full point achieved; decreased during lingual elevation trials without use of accessory jaw movement; increased movements noted bilaterally.)  Lingual Strength: (Decreased with fasciculations noted during lingual extension trials.)  Lingual Coordination: (Slowed movements were noted.)     Monitored patient's swallowing function with the following observations noted:     Oral Phase  Mastication: Soft and bite sized (Patient exhibited slow oral prep with decreased rotary jaw movement noted during soft and bite sized consistency presentations administered by SLP and independently.)  Suspected Premature Bolus Loss: Soft and bite sized (Oral transit of soft and bite sized consistency varied from 1-3 seconds in length.)  Oral Phase - Comment: Oral transit of puree consistency presentations, administered by SLP and independently, primarily measured 1-2 seconds in length and no oral cavity residue was noted post swallows. Min soft and bite sized consistency residue was observed post swallows; residue cleared from the mouth with additional dry swallows. Oral transit of thin liquid presentations, administered independently via straw, primarily measured 1-2 seconds in length.     Pharyngeal Phase  Suspected Swallow Delay: Soft and bite sized (Suspect secondary to oral transit times.)  Laryngeal Elevation: (Patient exhibited sluggish, mildly decreased laryngeal elevation for swallow airway protection.)  Pharyngeal Phase - Comment: No outward S/S penetration/aspiration was noted with any puree consistency presentation, soft and bite sized consistency presentation, or thin liquid presentation administered during treatment session this date.     At this time, would recommend continuation current diet. Assist during meals. Recommend meds whole in pudding/applesauce.      Electronically signed by DALTON Hogan on 5/28/2021 at 11:25 AM

## 2021-05-28 NOTE — PROGRESS NOTES
Progress Note    Patient name: Montserrat Odonnell  Patient : 1965  MR #[id number]  Room: 12    Portions of this note have been copied forward, however, changed to reflect the most current clinical status of this patient. Subjective: Chest tube removed 2021. Status post FNA left pleural-based mass 2021, await pathology. Patient states she has been doing more on her own. She is anxious for discharge home    HISTORY OF PRESENT ILLNESS:  Colin Braun was seen in initial oncology consultation on  2021 as an inpatient at [address] [place] [address]. She presented to the ER department with complaint of increasing short of breath over the last 5 weeks. She was treated with outpatient antibiotics for pneumonia without improvement. Luis Carlos Naidu was on ciprofloxacin and Augmentin. Her respiratory status got worse and she presented to the emergency with hypoxemia. Imaging studies revealed a large left pleural effusion with left lung consolidation, hilar/mediastinal adenopathy. The findings were concerning for underlying malignancy. Of note, she has several comorbiditiesto include a history of anxiety, migraines, systemic lupus erythematosus. Subjective   REVIEW OF SYSTEMS:   Review of Systems   Constitutional: Positive for activity change and fatigue. HENT: Negative for congestion, trouble swallowing and voice change. Eyes: Negative for discharge and itching. Respiratory: Positive for cough and shortness of breath (Improved). Negative for wheezing. Cardiovascular: Negative for chest pain and palpitations. Gastrointestinal: Negative for abdominal distention, abdominal pain, nausea and vomiting. Genitourinary: Negative for difficulty urinating and hematuria. Musculoskeletal: Positive for arthralgias. Negative for back pain and myalgias. Allergic/Immunologic: Negative for environmental allergies and immunocompromised state. Neurological: Positive for headaches.  Negative for seizures and speech time.      Comments: follows commands, non-focal   Psychiatric:         Behavior: Behavior normal. Behavior is cooperative. Thought Content: Thought content normal.         Judgment: Judgment normal.      Comments: Alert and oriented to person, place and time. Vital Signs  /66   Pulse 96   Temp 97.2 °F (36.2 °C) (Temporal)   Resp 20   Ht 5' 3\" (1.6 m)   Wt 170 lb 8 oz (77.3 kg)   SpO2 92%   BMI 30.20 kg/m²     Intake/Output Summary (Last 24 hours) at 5/28/2021 0645  Last data filed at 5/27/2021 2024  Gross per 24 hour   Intake 120 ml   Output --   Net 120 ml       Labs:  CBC:   Recent Labs     05/26/21  0240 05/27/21 0228 05/28/21 0225   WBC 10.7 11.8* 10.8   HGB 10.9* 10.1* 10.9*   * 471* 476*     CMP:   Recent Labs     05/26/21  0240 05/26/21  1753 05/27/21 0228 05/28/21 0225   GLUCOSE 94  --  96 91   BUN 9  --  9 10   CREATININE 0.6  --  0.5 0.5   CO2 27  --  23 26   CALCIUM 8.4*  --  7.9* 8.4*   ALKPHOS  --  94  --   --    AST  --  13  --   --    ALT  --  <5*  --   --      Hepatic:   Recent Labs     05/26/21  1753   AST 13   ALT <5*   BILITOT 0.4   ALKPHOS 94     Troponin: No results for input(s): TROPONINI in the last 72 hours. BNP: No results for input(s): BNP in the last 72 hours. INR:   Recent Labs     05/27/21  0921   INR 1.24*     ABG: No results for input(s): PHART, XDW4RSV, PO2ART, ECO0CRV, [id number], BEART in the last 72 hours. 30 Day lookback of cultures:    Blood Culture Recent:   Recent Labs     05/23/21  0730   BC No Growth to date. Any change in status will be called. Gram Stain Recent:   Recent Labs     05/23/21  1430   LABGRAM Moderate WBC's (Polymorphonuclear)  No organisms seen       Resp Culture Recent: No results for input(s): CULTRESP in the last 720 hours. Body Fluid Recent : No results for input(s): BFCX in the last 720 hours. MRSA Recent : No results for input(s): [facility] in the last 720 hours.    Urine Culture Recent : No results for input(s): LABURIN in the last 720 hours. Organism Recent : No results for input(s): ORG in the last 720 hours. ASSESSMENT:  #Lytic lesions-differential diagnosis include metastatic malignancy such as lung cancer versus multiple myeloma versus others. Given the current pulmonary findings, metastatic bone disease is more likely. #Left pleural effusion-concern for malignancy. Status post US guided chest tube placement  Preliminary results of pleural fluid: Suspicious for malignant cells  CT-guided needle aspiration of left pleural-based mass completed 5/27/2021, await pathology  Pulmonology following    Labs 5/27/2021:  SPEP: In process  Quantitative immunoglobulins: In process  Serum light chains: In process    Bone survey 5/27/2021: 2 small focal well-circumscribed lytic lesions in the skull that may represent lesions associated with multiple myeloma or metastatic disease. No additional osseous lesions identified       #Normocytic anemia  Hgb 10.9    Labs 5/27/2021:  B12 level: >2000   TSH: 0.9     #Thrombocytosis-likely reactive  Platelets [phone]    #Neutrophilic leukocytosis-likely reactive, improved  Possible community-acquired pneumonia  Cefepime/vancomycin per attending  Blood cultures x2 on 5/23/2021, no growth to date  Respiratory culture 5/23/2021: Negative  WBC 10.8 improved  Afebrile    #Headaches  CT head negative  MRI brain with/without contrast 5/27/2021:  1.4 cm left frontal skull lesion, 7 mm focus of abnormal enhancement in the left parietal calvarium suspicious for osseous metastasis  No midline shift or mass-effect. No acute intracranial findings, specifically no evidence of intracranial metastatic disease        PLAN:  Follow-up cytology from left pleural fluid  Follow-up pathology from FNA left pleural-based mass 5/27/2021  Continue pulmonary support, antibiotics, oxygen.   Per attending  Okay from oncology standpoint to discharge home, when okay with others  We will arrange

## 2021-05-28 NOTE — PROGRESS NOTES
[facility] Progress Note      Patient:   David Ayers  MR#:    [id number]   Room:    [facility]/895-12   YOB: 1965  Date of Progress Note: 5/28/2021  Time of Note                           11:24 AM  Consulting Physician:  Maikel Sánchez DO  Attending Physician:  Anjelica Dunbar MD      INTERVAL HISTORY:  No acute events overnight, much improved this am, much more alert, no confusion noted. REVIEW OF SYSTEMS:  Constitutional: No fevers No chills  Neck:No stiffness  Respiratory: No shortness of breath  Cardiovascular: No palpitations  Gastrointestinal: Abdominal pain    Genitourinary: No Dysuria  Neurological: No headache    PHYSICAL EXAM:    Constitutional -   /69   Pulse 90   Temp 96.3 °F (35.7 °C)   Resp 18   Ht 5' 3\" (1.6 m)   Wt 170 lb 8 oz (77.3 kg)   SpO2 92%   BMI 30.20 kg/m²   General appearance: No acute distress   EYES -   Conjunctiva normal  Pupillary exam as below, see CN exam in the neurologic exam  ENT-    No scars, masses, or lesions over external nose or ears  Oropharynx without erythema, palate midline  Cardiovascular -   No clubbing, cyanosis, or edema   Pulmonary-   Good expansion, normal effort without use of accessory muscles  Musculoskeletal -   No significant wasting of muscles noted  Gait as below, see gait exam in the neurologic exam  Muscle strength, tone, stability as below see the motor exam in the neurologic exam.   No bony deformities  Skin -   Warm, dry, and intact to inspection and palpation. No rash, erythema, or pallor  Psychiatric -   Mood, affect, and behavior appear normal    Memory as below see mental status examination in the neurologic exam        NEUROLOGICAL EXAM     Mental status    []? Awake, alert, oriented   []? Affect attention and concentration appear appropriate  []? Recent and remote memory appears unremarkable  []? Speech normal without dysarthria or aphasia, comprehension and repetition intact.    COMMENTS: Awake, speech better, confusion resolved. Cranial Nerves [x]? No VF deficit to confrontation  [x]? PERRLA, EOMI, no nystagmus, conjugate eye movements, no ptosis  [x]? Face symmetric  [x]? Facial sensation intact  [x]? Tongue midline no atrophy or fasciculations present  [x]? Palate midline, hearing to finger rub normal  [x]? Shoulder shrug and SCM testing normal  COMMENTS:   Motor   [x]? 5/5 strength x 4 extremities  [x]? Normal bulk and tone  [x]? No tremor present  [x]? No rigidity or bradykinesia noted  COMMENTS: 4/5 globally    Sensory  [x]? Sensation intact to light touch, pin prick, vibration, and proprioception BLE  []? Sensation intact to light touch, pin prick, vibration, and proprioception BUE  COMMENTS:   Coordination [x]? FTN normal bilaterally   []? HTS normal bilaterally  []? DANIA normal.   COMMENTS:   Reflexes  [x]? Symmetric and non-pathological  [x]? Toes downgoing bilaterally  [x]? No clonus present  COMMENTS:   Gait                  []? Normal steady gait    []? Ataxic    []? Spastic     []? Magnetic     []? Shuffling  [x]? Not assessed  COMMENTS:        LABS/IMAGING:    CT CHEST WO CONTRAST    Result Date: 5/24/2021  Examination. CT CHEST WO CONTRAST 5/24/2021 4:40 AM History: Follow-up after chest tube placement. DLP: 881 mGycm. A CT scan of the chest is performed without intravenous contrast enhancement. The images are acquired in axial plane with subsequent reconstruction in coronal and sagittal planes. The comparison is made with the previous study dated 5/23/2021. There is interval bone graft chest tube placement. There is significant improvement in the left-sided pleural effusion. Small residual pleural effusion is seen in the left lower chest. There is also small loculated fluid in the left lower chest posteriorly including the left major fissure. There is nodular thickening of the pleura more pronounced medially and posteriorly in the left chest no significant change since the previous study.  A hilar adenopathy and pleural nodularity. Large left pleural effusion. VASCULATURE: Contrast bolus is adequate. There are no pulmonary arterial intraluminal filling defects. Normal pulmonary artery caliber. Thoracic aorta is normal in course and caliber. CARDIAC:  Cardiac size is normal.  No pericardial effusion. MEDIASTINUM: Left hilar, AP window and left paratracheal lymphadenopathy. For instance, left paratracheal lymph node measures up to 1.3 cm in short axis on axial series 2, image 46. Esophagus has normal coarse, caliber and wall thickness. EXTRATHORACIC: The visualized portions of the thyroid gland are unremarkable. No thoracic inlet or axillary adenopathy. The extrathoracic soft tissues are within normal limits. INCLUDED UPPER ABDOMEN: Focal fatty infiltration of the liver adjacent to the falciform ligament. Arterial phase of imaging limits evaluation. The gallbladder, pancreas, spleen, adrenal glands appear within normal limits. Left retroperitoneal lymph node measures up to 1.2 cm in short axis on axial series 2, image 124. Nonobstructing right renal calculi. No hydronephrosis. OSSEOUS: No acute or suspicious bony finding. 1. Large left pleural effusion with pleural nodularity. Patchy left lung consolidative and groundglass opacities with left lower lobe atelectasis. Left hilar and mediastinal adenopathy. Overall, appearance is most concerning for malignancy. Malignancy with superimposed infection would also be a consideration. 2. Left retroperitoneal lymphadenopathy. 3. Nonobstructing right renal calculi.  Signed by Dr Nora Salvador on 5/23/2021 9:03 AM      Lab Results   Component Value Date    WBC 10.8 05/28/2021    HGB 10.9 (L) 05/28/2021    HCT 34.5 (L) 05/28/2021    MCV 98.9 05/28/2021     (H) 05/28/2021     Lab Results   Component Value Date     05/28/2021    K 4.1 05/28/2021     05/28/2021    CO2 26 05/28/2021    BUN 10 05/28/2021    CREATININE 0.5 05/28/2021    GLUCOSE 91 05/28/2021    CALCIUM 8.4 (L) 05/28/2021    PROT 6.6 05/26/2021    LABALBU 2.1 (L) 05/26/2021    BILITOT 0.4 05/26/2021    ALKPHOS 94 05/26/2021    AST 13 05/26/2021    ALT <5 (A) 05/26/2021    LABGLOM >60 05/28/2021    GFRAA >59 05/28/2021    AGRATIO 1.8 06/04/2018    GLOB 2.4 06/04/2018     Lab Results   Component Value Date    INR 1.24 (H) 05/27/2021    INR 1.12 05/23/2021    INR 0.91 10/29/2018    PROTIME 15.6 (H) 05/27/2021    PROTIME 14.4 05/23/2021    PROTIME 12.2 10/29/2018       RECORD REVIEW:   Previous medical records, medications were reviewed at today's visit. Nursing/physician notes, imaging, labs and vitals reviewed. PT,OT and/or speech notes reviewed    ASSESSMENT:  48 y.o. admitted with respiratory failure, pneumonia, large pleural effusion with chest tube placement, confusion, abnormal CT head with lytic skull lesions noted. MRI with nothing acute beyond the skull lesions which are concerning for possible osseous metastatic disease. Exam improved overnight. EEG with slowing, nothing epileptiform.      PLAN:  1. Oncology following. 2.  Limit sedating medications, stopped Elavil, decreased neurotin to 300 mg tid. 3.  Thiamine  4. Continue addressing medical issues, pneumonia, pleural effusion, respiratory failure - chest tube removed. [facility] for discharge from my standpoint once cleared medically. I will be out until Tuesday, Dr. Nomi Gilmore will be available this weekend if needed. Please feel free to call with any questions. [phone] (cell phone).     Abimbola Bhatia, DO  Board Certified Neurology

## 2021-05-28 NOTE — PROGRESS NOTES
SpO2 on 3 l/m at rest - 91%  SpO2 while ambulating on room air - 85%, increased O2 to 4 l/m, SpO2 up to 89%.

## 2021-05-28 NOTE — PLAN OF CARE
appropriate times will improve  Outcome: Ongoing  Goal: Ability to make healthy dietary choices will improve  Description: Ability to make healthy dietary choices will improve  Outcome: Ongoing  Goal: Progress toward achieving an optimal weight will improve  Description: Progress toward achieving an optimal weight will improve  Outcome: Ongoing  Goal: Ability to chew and swallow food without choking will improve  Description: Ability to chew and swallow food without choking will improve  Outcome: Ongoing  Goal: Ability to achieve adequate nutritional intake will improve  Description: Ability to achieve adequate nutritional intake will improve  Outcome: Ongoing  Goal: Ability to maintain an optimal weight for height and age will improve  Description: Ability to maintain an optimal weight for height and age will improve  Outcome: Ongoing     Problem: Respiratory:  Goal: Ability to maintain a clear airway will improve  Description: Ability to maintain a clear airway will improve  Outcome: Ongoing  Goal: Ability to maintain adequate ventilation will improve  Description: Ability to maintain adequate ventilation will improve  Outcome: Ongoing  Goal: Complications related to the disease process, condition or treatment will be avoided or minimized  Description: Complications related to the disease process, condition or treatment will be avoided or minimized  Outcome: Ongoing     Problem: Skin Integrity:  Goal: Risk for impaired skin integrity will decrease  Description: Risk for impaired skin integrity will decrease  Outcome: Ongoing     Problem: Self-Concept:  Goal: Ability to verbalize positive feelings about self will improve  Description: Ability to verbalize positive feelings about self will improve  Outcome: Ongoing     Problem: Bleeding:  Goal: Will show no signs and symptoms of excessive bleeding  Description: Will show no signs and symptoms of excessive bleeding  Outcome: Ongoing     Problem: SAFETY  Goal: Free from accidental physical injury  Outcome: Ongoing  Goal: Free from intentional harm  Outcome: Ongoing     Problem: DAILY CARE  Goal: Daily care needs are met  Outcome: Ongoing     Problem: PAIN  Goal: Patient's pain/discomfort is manageable  Outcome: Ongoing     Problem: SKIN INTEGRITY  Goal: Skin integrity is maintained or improved  Outcome: Ongoing     Problem: KNOWLEDGE DEFICIT  Goal: Patient/S.O. demonstrates understanding of disease process, treatment plan, medications, and discharge instructions.   Outcome: Ongoing     Problem: DISCHARGE BARRIERS  Goal: Patient's continuum of care needs are met  Outcome: Ongoing     Problem: ABCDS Injury Assessment  Goal: Absence of physical injury  Outcome: Ongoing     Problem: Discharge Planning:  Goal: Discharged to appropriate level of care  Description: Discharged to appropriate level of care  Outcome: Ongoing  Goal: Participates in care planning  Description: Participates in care planning  Outcome: Ongoing     Problem: Airway Clearance - Ineffective:  Goal: Clear lung sounds  Description: Clear lung sounds  Outcome: Ongoing     Problem: Fluid Volume - Deficit:  Goal: Achieves intake and output within specified parameters  Description: Achieves intake and output within specified parameters  Outcome: Ongoing     Problem: Gas Exchange - Impaired:  Goal: Levels of oxygenation will improve  Description: Levels of oxygenation will improve  Outcome: Ongoing     Problem: Hyperthermia:  Goal: Ability to maintain a body temperature in the normal range will improve  Description: Ability to maintain a body temperature in the normal range will improve  Outcome: Ongoing     Problem: Tobacco Use:  Goal: Will participate in inpatient tobacco-use cessation counseling  Description: Will participate in inpatient tobacco-use cessation counseling  Outcome: Ongoing     Problem: Physical Regulation:  Goal: Complications related to the disease process, condition or treatment will be avoided or

## 2021-05-30 LAB
+IMM: ABNORMAL
ALBUMIN SERPL-MCNC: 2.14 G/DL (ref 3.75–5.01)
ALPHA-1-GLOBULIN: 0.64 G/DL (ref 0.19–0.46)
ALPHA-2-GLOBULIN: 1.06 G/DL (ref 0.48–1.05)
BETA GLOBULIN: 0.88 G/DL (ref 0.48–1.1)
GAMMA GLOBULIN: 0.87 G/DL (ref 0.62–1.51)
IGA: 363 MG/DL (ref 68–408)
IGG: 941 MG/DL (ref 768–1632)
IGM: 35 MG/DL (ref 35–263)
KAPPA FREE LIGHT CHAINS QNT: 33.6 MG/L (ref 3.3–19.4)
KAPPA/LAMBDA FREE LIGHT CHAIN RATIO: 0.99 (ref 0.26–1.65)
LAMBDA FREE LIGHT CHAINS QNT: 33.97 MG/L (ref 5.71–26.3)
SPE/IFE INTERPRETATION: ABNORMAL
TOTAL PROTEIN: 5.6 G/DL (ref 6.3–8.2)

## 2021-06-01 ENCOUNTER — TELEPHONE (OUTPATIENT)
Dept: PRIMARY CARE CLINIC | Age: 56
End: 2021-06-01

## 2021-06-01 DIAGNOSIS — R53.1 WEAKNESS: Primary | ICD-10-CM

## 2021-06-01 NOTE — TELEPHONE ENCOUNTER
Black Jenkins with [facility] OT called and LM on VM stating she would like to see patient 1 time weekly for 4 weeks for OT and is requesting order for bedside commode sent to [employer] . Please advise. Thank you.

## 2021-06-01 NOTE — TELEPHONE ENCOUNTER
[address] [facility] Transitions Initial Follow Up Call    Outreach made within 2 business days of discharge: Yes    Patient: Otilia Lamar   Patient : 1965   MRN: [id number]    Reason for Admission: Acute respiratory failure with hypoxia [facility])  Discharge Date: 21       Spoke with: Cayla Nielsen    Discharge department/facility: [facility]    TCM Interactive Patient Contact:  Was patient able to fill all prescriptions: Yes  Was patient instructed to bring all medications to the follow-up visit: Yes  Is patient taking all medications as directed in the discharge summary? Yes  Does patient understand their discharge instructions: Yes  Does patient have questions or concerns that need addressed prior to 7-14 day follow up office visit: no      Spoke with Cayla Nielsen. She is feeling very tired since returning home. Her appetite is moderate, she states she can only eat very small portions at a time. She has normal bladder and bowel function. She has been trying to get in touch with her pain management Doctor and is frustrated over that. She has follow up with [facility]  and will have her first appointment with Oncology .   Scheduled appointment with PCP within 7-14 days    Follow Up  Future Appointments   Date Time Provider Salty Bre   2822 05:33 AM JERILYN Morales LPS [facility]   2021 11:45 AM [facility]   2021 12:00 PM SCHEDULE, [facility] MED ONC MA [facility] MED ONC [facility]   2021 10:00 AM GILBERT Schofield LPS NEURO [facility]   2021 10:00 AM Jatin Hewitt MD [address], [address] [place] [address]

## 2021-06-03 ENCOUNTER — TELEPHONE (OUTPATIENT)
Dept: INTERNAL MEDICINE CLINIC | Age: 56
End: 2021-06-03

## 2021-06-03 NOTE — TELEPHONE ENCOUNTER
[address] [address]  reporting that she saw pt today and discussed local resources in particular the home and community based waiver program   pt states she is out of pain meds and states the pharmacy said she picked them up on 5/15,  and she stated she was hospitalized at that time so that is not possible. When SW  Looked it was noted she was hospitalized 5/23-28. When SW told her this she  said well maybe I did pick them up. She states when she got home on 5/28 she had 10 pills left and they are gone. SW explained that if she was down to 10 by 5/23 then she was extremely short on them. They are not due for refill until Betsy 15 per patient.    pt is having increased pain -     SW not requesting further visits but will be glad to go back and assist should pt needs arise

## 2021-06-04 ENCOUNTER — HOSPITAL ENCOUNTER (INPATIENT)
Age: 56
LOS: 2 days | Discharge: HOSPICE | DRG: 180 | End: 2021-06-06
Attending: PEDIATRICS | Admitting: INTERNAL MEDICINE
Payer: MEDICAID

## 2021-06-04 ENCOUNTER — OFFICE VISIT (OUTPATIENT)
Dept: PRIMARY CARE CLINIC | Age: 56
End: 2021-06-04
Payer: MEDICAID

## 2021-06-04 ENCOUNTER — APPOINTMENT (OUTPATIENT)
Dept: GENERAL RADIOLOGY | Age: 56
DRG: 180 | End: 2021-06-04
Payer: MEDICAID

## 2021-06-04 ENCOUNTER — APPOINTMENT (OUTPATIENT)
Dept: CT IMAGING | Age: 56
DRG: 180 | End: 2021-06-04
Payer: MEDICAID

## 2021-06-04 VITALS
RESPIRATION RATE: 18 BRPM | BODY MASS INDEX: 31.22 KG/M2 | TEMPERATURE: 97 F | SYSTOLIC BLOOD PRESSURE: 82 MMHG | HEART RATE: 98 BPM | WEIGHT: 176.2 LBS | DIASTOLIC BLOOD PRESSURE: 50 MMHG | OXYGEN SATURATION: 93 % | HEIGHT: 63 IN

## 2021-06-04 DIAGNOSIS — I95.89 OTHER SPECIFIED HYPOTENSION: ICD-10-CM

## 2021-06-04 DIAGNOSIS — C34.92 ADENOCARCINOMA OF LEFT LUNG, STAGE 4 (HCC): ICD-10-CM

## 2021-06-04 DIAGNOSIS — Z09 HOSPITAL DISCHARGE FOLLOW-UP: Primary | ICD-10-CM

## 2021-06-04 DIAGNOSIS — I95.9 HYPOTENSION, UNSPECIFIED HYPOTENSION TYPE: ICD-10-CM

## 2021-06-04 DIAGNOSIS — A41.9 SEPTICEMIA (HCC): Primary | ICD-10-CM

## 2021-06-04 DIAGNOSIS — C79.9 METASTATIC ADENOCARCINOMA (HCC): ICD-10-CM

## 2021-06-04 DIAGNOSIS — J18.9 PNEUMONIA DUE TO ORGANISM: ICD-10-CM

## 2021-06-04 DIAGNOSIS — R07.9 CHEST PAIN, UNSPECIFIED TYPE: ICD-10-CM

## 2021-06-04 DIAGNOSIS — R09.02 HYPOXIA: ICD-10-CM

## 2021-06-04 PROBLEM — R63.0 ANOREXIA: Status: ACTIVE | Noted: 2021-06-04

## 2021-06-04 PROBLEM — C34.90 METASTATIC LUNG CANCER (METASTASIS FROM LUNG TO OTHER SITE) (HCC): Status: ACTIVE | Noted: 2021-06-04

## 2021-06-04 PROBLEM — R06.00 DYSPNEA: Status: ACTIVE | Noted: 2021-06-04

## 2021-06-04 PROBLEM — R78.81 BACTEREMIA DUE TO STREPTOCOCCUS PNEUMONIAE: Status: ACTIVE | Noted: 2021-06-04

## 2021-06-04 PROBLEM — Z72.0 TOBACCO ABUSE: Status: ACTIVE | Noted: 2021-06-04

## 2021-06-04 PROBLEM — G89.4 PAIN SYNDROME, CHRONIC: Status: ACTIVE | Noted: 2021-06-04

## 2021-06-04 PROBLEM — B95.3 BACTEREMIA DUE TO STREPTOCOCCUS PNEUMONIAE: Status: ACTIVE | Noted: 2021-06-04

## 2021-06-04 PROBLEM — J44.9 COPD (CHRONIC OBSTRUCTIVE PULMONARY DISEASE) (HCC): Status: ACTIVE | Noted: 2021-06-04

## 2021-06-04 LAB
ALBUMIN SERPL-MCNC: 2.5 G/DL (ref 3.5–5.2)
ALP BLD-CCNC: 133 U/L (ref 35–104)
ALT SERPL-CCNC: 8 U/L (ref 5–33)
ANION GAP SERPL CALCULATED.3IONS-SCNC: 10 MMOL/L (ref 7–19)
AST SERPL-CCNC: 24 U/L (ref 5–32)
BACTERIA: NEGATIVE /HPF
BASE EXCESS ARTERIAL: -0.4 MMOL/L (ref -2–2)
BASOPHILS ABSOLUTE: 0.1 K/UL (ref 0–0.2)
BASOPHILS RELATIVE PERCENT: 0.5 % (ref 0–1)
BILIRUB SERPL-MCNC: 0.3 MG/DL (ref 0.2–1.2)
BILIRUBIN URINE: NEGATIVE
BLOOD, URINE: NEGATIVE
BUN BLDV-MCNC: 5 MG/DL (ref 6–20)
CALCIUM SERPL-MCNC: 8.2 MG/DL (ref 8.6–10)
CARBOXYHEMOGLOBIN ARTERIAL: 1.6 % (ref 0–5)
CHLORIDE BLD-SCNC: 99 MMOL/L (ref 98–111)
CLARITY: CLEAR
CO2: 26 MMOL/L (ref 22–29)
COLOR: YELLOW
CREAT SERPL-MCNC: 0.5 MG/DL (ref 0.5–0.9)
CRYSTALS, UA: ABNORMAL /HPF
EOSINOPHILS ABSOLUTE: 0 K/UL (ref 0–0.6)
EOSINOPHILS RELATIVE PERCENT: 0.4 % (ref 0–5)
EPITHELIAL CELLS, UA: 2 /HPF (ref 0–5)
GFR AFRICAN AMERICAN: >59
GFR NON-AFRICAN AMERICAN: >60
GLUCOSE BLD-MCNC: 86 MG/DL (ref 74–109)
GLUCOSE URINE: NEGATIVE MG/DL
HCO3 ARTERIAL: 24.1 MMOL/L (ref 22–26)
HCT VFR BLD CALC: 33.5 % (ref 37–47)
HEMOGLOBIN, ART, EXTENDED: 9.9 G/DL (ref 12–16)
HEMOGLOBIN: 10.4 G/DL (ref 12–16)
HYALINE CASTS: 3 /HPF (ref 0–8)
IMMATURE GRANULOCYTES #: 0.1 K/UL
KETONES, URINE: NEGATIVE MG/DL
LACTIC ACID: 1.8 MMOL/L (ref 0.5–1.9)
LEUKOCYTE ESTERASE, URINE: ABNORMAL
LIPASE: 14 U/L (ref 13–60)
LYMPHOCYTES ABSOLUTE: 1.3 K/UL (ref 1.1–4.5)
LYMPHOCYTES RELATIVE PERCENT: 11.9 % (ref 20–40)
MCH RBC QN AUTO: 30.3 PG (ref 27–31)
MCHC RBC AUTO-ENTMCNC: 31 G/DL (ref 33–37)
MCV RBC AUTO: 97.7 FL (ref 81–99)
METHEMOGLOBIN ARTERIAL: 1 %
MONOCYTES ABSOLUTE: 1 K/UL (ref 0–0.9)
MONOCYTES RELATIVE PERCENT: 8.9 % (ref 0–10)
NEUTROPHILS ABSOLUTE: 8.5 K/UL (ref 1.5–7.5)
NEUTROPHILS RELATIVE PERCENT: 77.8 % (ref 50–65)
NITRITE, URINE: NEGATIVE
O2 CONTENT ARTERIAL: 13.1 ML/DL
O2 SAT, ARTERIAL: 93.8 %
O2 THERAPY: ABNORMAL
PCO2 ARTERIAL: 38 MMHG (ref 35–45)
PDW BLD-RTO: 13 % (ref 11.5–14.5)
PH ARTERIAL: 7.41 (ref 7.35–7.45)
PH UA: 8 (ref 5–8)
PLATELET # BLD: 562 K/UL (ref 130–400)
PMV BLD AUTO: 9 FL (ref 9.4–12.3)
PO2 ARTERIAL: 69 MMHG (ref 80–100)
POTASSIUM REFLEX MAGNESIUM: 4.6 MMOL/L (ref 3.5–5)
POTASSIUM, WHOLE BLOOD: 3.7
PROCALCITONIN: 0.18 NG/ML (ref 0–0.09)
PROTEIN UA: NEGATIVE MG/DL
RBC # BLD: 3.43 M/UL (ref 4.2–5.4)
RBC UA: 4 /HPF (ref 0–4)
SARS-COV-2, NAAT: NOT DETECTED
SODIUM BLD-SCNC: 135 MMOL/L (ref 136–145)
SPECIFIC GRAVITY UA: 1.01 (ref 1–1.03)
TOTAL PROTEIN: 6.2 G/DL (ref 6.6–8.7)
TROPONIN: <0.01 NG/ML (ref 0–0.03)
UROBILINOGEN, URINE: 2 E.U./DL
WBC # BLD: 10.9 K/UL (ref 4.8–10.8)
WBC UA: 1 /HPF (ref 0–5)

## 2021-06-04 PROCEDURE — 81001 URINALYSIS AUTO W/SCOPE: CPT

## 2021-06-04 PROCEDURE — 87040 BLOOD CULTURE FOR BACTERIA: CPT

## 2021-06-04 PROCEDURE — 6360000002 HC RX W HCPCS: Performed by: INTERNAL MEDICINE

## 2021-06-04 PROCEDURE — 87635 SARS-COV-2 COVID-19 AMP PRB: CPT

## 2021-06-04 PROCEDURE — 84484 ASSAY OF TROPONIN QUANT: CPT

## 2021-06-04 PROCEDURE — 83605 ASSAY OF LACTIC ACID: CPT

## 2021-06-04 PROCEDURE — 96365 THER/PROPH/DIAG IV INF INIT: CPT

## 2021-06-04 PROCEDURE — 71275 CT ANGIOGRAPHY CHEST: CPT

## 2021-06-04 PROCEDURE — 83690 ASSAY OF LIPASE: CPT

## 2021-06-04 PROCEDURE — 93005 ELECTROCARDIOGRAM TRACING: CPT | Performed by: PEDIATRICS

## 2021-06-04 PROCEDURE — 84145 PROCALCITONIN (PCT): CPT

## 2021-06-04 PROCEDURE — 96375 TX/PRO/DX INJ NEW DRUG ADDON: CPT

## 2021-06-04 PROCEDURE — 2580000003 HC RX 258: Performed by: INTERNAL MEDICINE

## 2021-06-04 PROCEDURE — 99215 OFFICE O/P EST HI 40 MIN: CPT | Performed by: NURSE PRACTITIONER

## 2021-06-04 PROCEDURE — 2580000003 HC RX 258: Performed by: PEDIATRICS

## 2021-06-04 PROCEDURE — 96374 THER/PROPH/DIAG INJ IV PUSH: CPT

## 2021-06-04 PROCEDURE — 99283 EMERGENCY DEPT VISIT LOW MDM: CPT

## 2021-06-04 PROCEDURE — 6360000002 HC RX W HCPCS: Performed by: PEDIATRICS

## 2021-06-04 PROCEDURE — 82803 BLOOD GASES ANY COMBINATION: CPT

## 2021-06-04 PROCEDURE — 6360000004 HC RX CONTRAST MEDICATION: Performed by: PEDIATRICS

## 2021-06-04 PROCEDURE — 1111F DSCHRG MED/CURRENT MED MERGE: CPT | Performed by: NURSE PRACTITIONER

## 2021-06-04 PROCEDURE — 80053 COMPREHEN METABOLIC PANEL: CPT

## 2021-06-04 PROCEDURE — 84132 ASSAY OF SERUM POTASSIUM: CPT

## 2021-06-04 PROCEDURE — 2140000000 HC CCU INTERMEDIATE R&B

## 2021-06-04 PROCEDURE — 85025 COMPLETE CBC W/AUTO DIFF WBC: CPT

## 2021-06-04 PROCEDURE — 71045 X-RAY EXAM CHEST 1 VIEW: CPT

## 2021-06-04 PROCEDURE — 36600 WITHDRAWAL OF ARTERIAL BLOOD: CPT

## 2021-06-04 PROCEDURE — 36415 COLL VENOUS BLD VENIPUNCTURE: CPT

## 2021-06-04 RX ORDER — IPRATROPIUM BROMIDE AND ALBUTEROL SULFATE 2.5; .5 MG/3ML; MG/3ML
1 SOLUTION RESPIRATORY (INHALATION)
Status: DISCONTINUED | OUTPATIENT
Start: 2021-06-04 | End: 2021-06-04

## 2021-06-04 RX ORDER — POLYETHYLENE GLYCOL 3350 17 G/17G
17 POWDER, FOR SOLUTION ORAL DAILY PRN
Status: DISCONTINUED | OUTPATIENT
Start: 2021-06-04 | End: 2021-06-06 | Stop reason: HOSPADM

## 2021-06-04 RX ORDER — ACETAMINOPHEN 325 MG/1
650 TABLET ORAL EVERY 6 HOURS PRN
Status: DISCONTINUED | OUTPATIENT
Start: 2021-06-04 | End: 2021-06-06 | Stop reason: HOSPADM

## 2021-06-04 RX ORDER — OXYMETAZOLINE HYDROCHLORIDE 0.05 G/100ML
2 SPRAY NASAL 2 TIMES DAILY
COMMUNITY

## 2021-06-04 RX ORDER — ALBUMIN (HUMAN) 12.5 G/50ML
25 SOLUTION INTRAVENOUS ONCE
Status: DISCONTINUED | OUTPATIENT
Start: 2021-06-04 | End: 2021-06-06 | Stop reason: HOSPADM

## 2021-06-04 RX ORDER — AZITHROMYCIN 500 MG/1
500 TABLET, FILM COATED ORAL DAILY
Status: ON HOLD | COMMUNITY
End: 2021-06-06 | Stop reason: HOSPADM

## 2021-06-04 RX ORDER — ONDANSETRON 2 MG/ML
4 INJECTION INTRAMUSCULAR; INTRAVENOUS EVERY 6 HOURS PRN
Status: DISCONTINUED | OUTPATIENT
Start: 2021-06-04 | End: 2021-06-06 | Stop reason: HOSPADM

## 2021-06-04 RX ORDER — POLYETHYLENE GLYCOL 3350 17 G/17G
17 POWDER, FOR SOLUTION ORAL DAILY PRN
COMMUNITY

## 2021-06-04 RX ORDER — CYCLOBENZAPRINE HCL 10 MG
10 TABLET ORAL ONCE
Status: DISCONTINUED | OUTPATIENT
Start: 2021-06-04 | End: 2021-06-04

## 2021-06-04 RX ORDER — METHYLPREDNISOLONE SODIUM SUCCINATE 40 MG/ML
40 INJECTION, POWDER, LYOPHILIZED, FOR SOLUTION INTRAMUSCULAR; INTRAVENOUS EVERY 8 HOURS
Status: DISCONTINUED | OUTPATIENT
Start: 2021-06-04 | End: 2021-06-06 | Stop reason: HOSPADM

## 2021-06-04 RX ORDER — HYDROCODONE BITARTRATE AND ACETAMINOPHEN 7.5; 325 MG/1; MG/1
1 TABLET ORAL EVERY 6 HOURS PRN
Qty: 20 TABLET | Refills: 0 | Status: CANCELLED | OUTPATIENT
Start: 2021-06-04 | End: 2021-06-09

## 2021-06-04 RX ORDER — NALOXONE HYDROCHLORIDE 4 MG/.1ML
SPRAY NASAL
COMMUNITY
Start: 2021-04-08

## 2021-06-04 RX ORDER — BUDESONIDE 0.5 MG/2ML
0.5 INHALANT ORAL 2 TIMES DAILY
Status: DISCONTINUED | OUTPATIENT
Start: 2021-06-05 | End: 2021-06-06 | Stop reason: HOSPADM

## 2021-06-04 RX ORDER — MORPHINE SULFATE 4 MG/ML
2 INJECTION, SOLUTION INTRAMUSCULAR; INTRAVENOUS
Status: DISCONTINUED | OUTPATIENT
Start: 2021-06-04 | End: 2021-06-06

## 2021-06-04 RX ORDER — POTASSIUM CHLORIDE 20 MEQ/1
40 TABLET, EXTENDED RELEASE ORAL PRN
Status: DISCONTINUED | OUTPATIENT
Start: 2021-06-04 | End: 2021-06-06 | Stop reason: HOSPADM

## 2021-06-04 RX ORDER — HYDROMORPHONE HYDROCHLORIDE 1 MG/ML
0.5 INJECTION, SOLUTION INTRAMUSCULAR; INTRAVENOUS; SUBCUTANEOUS EVERY 30 MIN PRN
Status: DISCONTINUED | OUTPATIENT
Start: 2021-06-04 | End: 2021-06-04

## 2021-06-04 RX ORDER — DOCUSATE SODIUM 100 MG/1
100 CAPSULE, LIQUID FILLED ORAL DAILY
Status: DISCONTINUED | OUTPATIENT
Start: 2021-06-05 | End: 2021-06-06 | Stop reason: HOSPADM

## 2021-06-04 RX ORDER — CITALOPRAM 20 MG/1
40 TABLET ORAL DAILY
Status: DISCONTINUED | OUTPATIENT
Start: 2021-06-05 | End: 2021-06-06 | Stop reason: HOSPADM

## 2021-06-04 RX ORDER — BUDESONIDE AND FORMOTEROL FUMARATE DIHYDRATE 160; 4.5 UG/1; UG/1
2 AEROSOL RESPIRATORY (INHALATION) 2 TIMES DAILY
Status: DISCONTINUED | OUTPATIENT
Start: 2021-06-04 | End: 2021-06-04

## 2021-06-04 RX ORDER — POTASSIUM CHLORIDE 7.45 MG/ML
10 INJECTION INTRAVENOUS PRN
Status: DISCONTINUED | OUTPATIENT
Start: 2021-06-04 | End: 2021-06-06 | Stop reason: HOSPADM

## 2021-06-04 RX ORDER — MONTELUKAST SODIUM 10 MG/1
10 TABLET ORAL NIGHTLY
Status: DISCONTINUED | OUTPATIENT
Start: 2021-06-04 | End: 2021-06-06 | Stop reason: HOSPADM

## 2021-06-04 RX ORDER — CLONAZEPAM 0.5 MG/1
0.5 TABLET ORAL 2 TIMES DAILY
Status: DISCONTINUED | OUTPATIENT
Start: 2021-06-04 | End: 2021-06-06 | Stop reason: HOSPADM

## 2021-06-04 RX ORDER — IPRATROPIUM BROMIDE AND ALBUTEROL SULFATE 2.5; .5 MG/3ML; MG/3ML
SOLUTION RESPIRATORY (INHALATION)
Status: DISCONTINUED
Start: 2021-06-04 | End: 2021-06-04 | Stop reason: WASHOUT

## 2021-06-04 RX ORDER — NICOTINE 21 MG/24HR
1 PATCH, TRANSDERMAL 24 HOURS TRANSDERMAL DAILY
Status: DISCONTINUED | OUTPATIENT
Start: 2021-06-05 | End: 2021-06-06 | Stop reason: HOSPADM

## 2021-06-04 RX ORDER — LEVOFLOXACIN 5 MG/ML
500 INJECTION, SOLUTION INTRAVENOUS EVERY 24 HOURS
Status: DISCONTINUED | OUTPATIENT
Start: 2021-06-04 | End: 2021-06-06 | Stop reason: HOSPADM

## 2021-06-04 RX ORDER — SODIUM CHLORIDE 0.9 % (FLUSH) 0.9 %
5-40 SYRINGE (ML) INJECTION EVERY 12 HOURS SCHEDULED
Status: DISCONTINUED | OUTPATIENT
Start: 2021-06-04 | End: 2021-06-06 | Stop reason: HOSPADM

## 2021-06-04 RX ORDER — MORPHINE SULFATE 4 MG/ML
1 INJECTION, SOLUTION INTRAMUSCULAR; INTRAVENOUS EVERY 4 HOURS PRN
Status: DISCONTINUED | OUTPATIENT
Start: 2021-06-04 | End: 2021-06-04

## 2021-06-04 RX ORDER — HYDROCODONE BITARTRATE AND ACETAMINOPHEN 7.5; 325 MG/1; MG/1
TABLET ORAL
COMMUNITY
Start: 2021-05-15

## 2021-06-04 RX ORDER — SODIUM CHLORIDE 9 MG/ML
25 INJECTION, SOLUTION INTRAVENOUS PRN
Status: DISCONTINUED | OUTPATIENT
Start: 2021-06-04 | End: 2021-06-06 | Stop reason: HOSPADM

## 2021-06-04 RX ORDER — MIDODRINE HYDROCHLORIDE 10 MG/1
10 TABLET ORAL
Status: DISCONTINUED | OUTPATIENT
Start: 2021-06-04 | End: 2021-06-06 | Stop reason: HOSPADM

## 2021-06-04 RX ORDER — SODIUM CHLORIDE 0.9 % (FLUSH) 0.9 %
5-40 SYRINGE (ML) INJECTION PRN
Status: DISCONTINUED | OUTPATIENT
Start: 2021-06-04 | End: 2021-06-06 | Stop reason: HOSPADM

## 2021-06-04 RX ORDER — ROPINIROLE 0.5 MG/1
0.25 TABLET, FILM COATED ORAL NIGHTLY
Status: DISCONTINUED | OUTPATIENT
Start: 2021-06-04 | End: 2021-06-06 | Stop reason: HOSPADM

## 2021-06-04 RX ORDER — TOPIRAMATE 50 MG/1
50 TABLET, FILM COATED ORAL 2 TIMES DAILY
Status: DISCONTINUED | OUTPATIENT
Start: 2021-06-04 | End: 2021-06-06 | Stop reason: HOSPADM

## 2021-06-04 RX ORDER — ACETAMINOPHEN 650 MG/1
650 SUPPOSITORY RECTAL EVERY 6 HOURS PRN
Status: DISCONTINUED | OUTPATIENT
Start: 2021-06-04 | End: 2021-06-06 | Stop reason: HOSPADM

## 2021-06-04 RX ORDER — GAUZE BANDAGE 2" X 2"
100 BANDAGE TOPICAL DAILY
Status: DISCONTINUED | OUTPATIENT
Start: 2021-06-05 | End: 2021-06-06 | Stop reason: HOSPADM

## 2021-06-04 RX ORDER — MAGNESIUM SULFATE IN WATER 40 MG/ML
2000 INJECTION, SOLUTION INTRAVENOUS PRN
Status: DISCONTINUED | OUTPATIENT
Start: 2021-06-04 | End: 2021-06-06 | Stop reason: HOSPADM

## 2021-06-04 RX ORDER — BUSPIRONE HYDROCHLORIDE 5 MG/1
5 TABLET ORAL 2 TIMES DAILY
Status: DISCONTINUED | OUTPATIENT
Start: 2021-06-04 | End: 2021-06-06 | Stop reason: HOSPADM

## 2021-06-04 RX ORDER — 0.9 % SODIUM CHLORIDE 0.9 %
500 INTRAVENOUS SOLUTION INTRAVENOUS ONCE
Status: DISCONTINUED | OUTPATIENT
Start: 2021-06-04 | End: 2021-06-06 | Stop reason: HOSPADM

## 2021-06-04 RX ORDER — TIZANIDINE 4 MG/1
2 TABLET ORAL 3 TIMES DAILY
Status: DISCONTINUED | OUTPATIENT
Start: 2021-06-04 | End: 2021-06-06 | Stop reason: HOSPADM

## 2021-06-04 RX ORDER — 0.9 % SODIUM CHLORIDE 0.9 %
1000 INTRAVENOUS SOLUTION INTRAVENOUS ONCE
Status: COMPLETED | OUTPATIENT
Start: 2021-06-04 | End: 2021-06-04

## 2021-06-04 RX ORDER — FENTANYL CITRATE 50 UG/ML
100 INJECTION, SOLUTION INTRAMUSCULAR; INTRAVENOUS ONCE
Status: COMPLETED | OUTPATIENT
Start: 2021-06-04 | End: 2021-06-04

## 2021-06-04 RX ORDER — GABAPENTIN 300 MG/1
300 CAPSULE ORAL 3 TIMES DAILY
Status: DISCONTINUED | OUTPATIENT
Start: 2021-06-04 | End: 2021-06-06 | Stop reason: HOSPADM

## 2021-06-04 RX ADMIN — VANCOMYCIN HYDROCHLORIDE 1250 MG: 10 INJECTION, POWDER, LYOPHILIZED, FOR SOLUTION INTRAVENOUS at 20:33

## 2021-06-04 RX ADMIN — FENTANYL CITRATE 100 MCG: 50 INJECTION, SOLUTION INTRAMUSCULAR; INTRAVENOUS at 16:25

## 2021-06-04 RX ADMIN — SODIUM CHLORIDE 1000 ML: 9 INJECTION, SOLUTION INTRAVENOUS at 20:33

## 2021-06-04 RX ADMIN — CEFEPIME HYDROCHLORIDE 1000 MG: 1 INJECTION, POWDER, FOR SOLUTION INTRAMUSCULAR; INTRAVENOUS at 20:17

## 2021-06-04 RX ADMIN — IOPAMIDOL 90 ML: 755 INJECTION, SOLUTION INTRAVENOUS at 17:18

## 2021-06-04 RX ADMIN — HYDROMORPHONE HYDROCHLORIDE 0.5 MG: 1 INJECTION, SOLUTION INTRAMUSCULAR; INTRAVENOUS; SUBCUTANEOUS at 21:48

## 2021-06-04 RX ADMIN — Medication 2 MG: at 23:02

## 2021-06-04 RX ADMIN — Medication 10 ML: at 23:07

## 2021-06-04 RX ADMIN — MORPHINE SULFATE 1 MG: 4 INJECTION, SOLUTION INTRAMUSCULAR; INTRAVENOUS at 20:16

## 2021-06-04 RX ADMIN — METHYLPREDNISOLONE SODIUM SUCCINATE 40 MG: 40 INJECTION, POWDER, FOR SOLUTION INTRAMUSCULAR; INTRAVENOUS at 23:02

## 2021-06-04 RX ADMIN — HYDROMORPHONE HYDROCHLORIDE 0.5 MG: 1 INJECTION, SOLUTION INTRAMUSCULAR; INTRAVENOUS; SUBCUTANEOUS at 19:29

## 2021-06-04 ASSESSMENT — ENCOUNTER SYMPTOMS
BACK PAIN: 1
SHORTNESS OF BREATH: 1
PHOTOPHOBIA: 0
BACK PAIN: 0
VOMITING: 0
NAUSEA: 0
RHINORRHEA: 0
VOICE CHANGE: 0
GASTROINTESTINAL NEGATIVE: 1
EYES NEGATIVE: 1
NAUSEA: 0
EYE DISCHARGE: 0
DIARRHEA: 0
VOMITING: 0
SHORTNESS OF BREATH: 1
ABDOMINAL PAIN: 0
COUGH: 1
BACK PAIN: 1
COLOR CHANGE: 0
ABDOMINAL DISTENTION: 1
WHEEZING: 1
COUGH: 0
RHINORRHEA: 0
ALLERGIC/IMMUNOLOGIC NEGATIVE: 1
COLOR CHANGE: 0

## 2021-06-04 ASSESSMENT — PAIN SCALES - GENERAL
PAINLEVEL_OUTOF10: 10
PAINLEVEL_OUTOF10: 6
PAINLEVEL_OUTOF10: 10
PAINLEVEL_OUTOF10: 10
PAINLEVEL_OUTOF10: 8
PAINLEVEL_OUTOF10: 10

## 2021-06-04 ASSESSMENT — PAIN DESCRIPTION - ORIENTATION: ORIENTATION: LEFT

## 2021-06-04 ASSESSMENT — PAIN DESCRIPTION - LOCATION: LOCATION: RIB CAGE

## 2021-06-04 NOTE — LETTER
[address]    ?     Jennifer Vasquez,  at [address] [address] [phone] phone  [phone] fax  [phone] CELL ([address])        Jennifer aVsquez  at [address] [address] [phone] phone  [phone] fax  [phone] CELL ([address])

## 2021-06-04 NOTE — PROGRESS NOTES
Post-Discharge Transitional Care Management Services or Hospital Follow Up      Howard Mccall   YOB: 1965    Date of Office Visit:  6/4/2021  Date of Hospital Admission: 5/23/21  Date of Hospital Discharge: 5/28/21  Readmission Risk Score(high >=14%.  Medium >=10%):Readmission Risk Score: 18    Care management risk score Rising risk (score 2-5) and Complex Care (Scores >=6): 4     Non face to face  following discharge, date last encounter closed (first attempt may have been earlier): 6/1/2021  2:37 PM 6/1/2021  2:37 PM    Call initiated 2 business days of discharge: Yes     Patient Active Problem List   Diagnosis    Rectal bleeding    Rectal pain    Lower abdominal pain    Chronic constipation    Heartburn    Uncomfortable fullness after meals    Chronic GERD    Benign colon polyp    Panic attack    Depression    Neck pain    Chronic tension-type headache, intractable    Cervical radiculopathy    Cervical disc disease    Hypoesthesia of skin    Pain in both upper extremities    Abdominal cramping    Difficult bowel movements    Anxiety    Internal hemorrhoid, bleeding    Epigastric pain    Nausea and vomiting    Esophageal dysphagia    Acute respiratory failure with hypoxia (HCC)    Large pleural effusion    S/P chest tube placement ([address]    Left lower lobe pneumonia    Hilar lymphadenopathy    Tobacco abuse counseling    Cigarette nicotine dependence with nicotine-induced disorder    Palliative care patient    Abnormal CT scan of head    Weakness of both hands       Allergies   Allergen Reactions    Ventolin [Albuterol] Swelling       Medications listed as ordered at the time of discharge from hospital   [facility] Medication Instructions NITA:    Printed on:06/04/21 1231   Medication Information                      azithromycin (ZITHROMAX) 500 MG tablet  Take 500 mg by mouth daily             busPIRone (BUSPAR) 10 MG tablet  Take 1 tablet by mouth twice daily             cefdinir (OMNICEF) 300 MG capsule  Take 1 capsule by mouth 2 times daily for 7 days             citalopram (CELEXA) 40 MG tablet  Take 1 tablet by mouth once daily             clonazePAM (KLONOPIN) 0.5 MG tablet  Take 0.5 mg by mouth 2 times daily as needed. diclofenac sodium 1 % GEL  Apply 2 g topically 4 times daily             docusate sodium (COLACE, DULCOLAX) 100 MG CAPS  Take 100 mg by mouth daily             fluticasone (FLONASE) 50 MCG/ACT nasal spray  USE 1 SPRAY(S) IN EACH NOSTRIL ONCE DAILY             gabapentin (NEURONTIN) 300 MG capsule  Take 1 capsule by mouth 3 times daily for 30 days. Galcanezumab-gnlm (EMGALITY) 120 MG/ML SOAJ  1 injection every 30 days             HYDROcodone-acetaminophen (NORCO) 7.5-325 MG per tablet  TAKE 1 TABLET BY MOUTH THREE TIMES DAILY MUST LAST 30 DAYS             ipratropium (ATROVENT HFA) 17 MCG/ACT inhaler  Inhale 1 puff into the lungs 3 times daily             montelukast (SINGULAIR) 10 MG tablet  Take 1 tablet by mouth nightly             NARCAN 4 MG/0.1ML LIQD nasal spray  ADMINISTER A SINGLE SPRAY IN ONE NOSTRIL UPON SIGNS OF OPIOID OVERDOSE. CALL 911.  REPEAT AFTER 3 MINUTES IF NO RESPONSE.             omeprazole (PRILOSEC) 40 MG delayed release capsule  TAKE ONE CAPSULE BY MOUTH ONCE DAILY             ondansetron (ZOFRAN ODT) 4 MG disintegrating tablet  Take 1 tablet by mouth every 8 hours as needed for Nausea or Vomiting             OXYGEN  Inhale into the lungs             oxymetazoline (AFRIN) 0.05 % nasal spray  2 sprays by Nasal route 2 times daily             polyethylene glycol (GLYCOLAX) 17 g packet  Take 17 g by mouth daily as needed for Constipation             propranolol (INDERAL) 10 MG tablet  Take 1 tablet by mouth 2 times daily             rOPINIRole (REQUIP) 0.25 MG tablet  Take 1 tablet by mouth nightly             simvastatin (ZOCOR) 10 MG tablet  Take 1 tablet by mouth nightly sucralfate (CARAFATE) 1 GM tablet  Take 1 tablet by mouth twice daily             SUMAtriptan (IMITREX) 25 MG tablet  Take 1 tablet prn migraine             tiZANidine (ZANAFLEX) 2 MG tablet  Take 1 tablet by mouth 3 times daily             topiramate (TOPAMAX) 50 MG tablet  TAKE 1 TABLET BY MOUTH TWICE DAILY             vitamin B-1 (THIAMINE) 100 MG tablet  Take 1 tablet by mouth daily                   Medications marked \"taking\" at this time  Outpatient Medications Marked as Taking for the 6/4/21 encounter (Office Visit) with JERILYN Suero   Medication Sig Dispense Refill    azithromycin (ZITHROMAX) 500 MG tablet Take 500 mg by mouth daily      OXYGEN Inhale into the lungs      oxymetazoline (AFRIN) 0.05 % nasal spray 2 sprays by Nasal route 2 times daily      polyethylene glycol (GLYCOLAX) 17 g packet Take 17 g by mouth daily as needed for Constipation      HYDROcodone-acetaminophen (NORCO) 7.5-325 MG per tablet TAKE 1 TABLET BY MOUTH THREE TIMES DAILY MUST LAST 30 DAYS      NARCAN 4 MG/0.1ML LIQD nasal spray ADMINISTER A SINGLE SPRAY IN ONE NOSTRIL UPON SIGNS OF OPIOID OVERDOSE. CALL 911. REPEAT AFTER 3 MINUTES IF NO RESPONSE.  montelukast (SINGULAIR) 10 MG tablet Take 1 tablet by mouth nightly 30 tablet 3    gabapentin (NEURONTIN) 300 MG capsule Take 1 capsule by mouth 3 times daily for 30 days.  90 capsule 0    propranolol (INDERAL) 10 MG tablet Take 1 tablet by mouth 2 times daily 90 tablet 3    docusate sodium (COLACE, DULCOLAX) 100 MG CAPS Take 100 mg by mouth daily 30 capsule 0    vitamin B-1 (THIAMINE) 100 MG tablet Take 1 tablet by mouth daily 30 tablet 3    cefdinir (OMNICEF) 300 MG capsule Take 1 capsule by mouth 2 times daily for 7 days 14 capsule 0    tiZANidine (ZANAFLEX) 2 MG tablet Take 1 tablet by mouth 3 times daily 30 tablet 0    citalopram (CELEXA) 40 MG tablet Take 1 tablet by mouth once daily 90 tablet 0    sucralfate (CARAFATE) 1 GM tablet Take 1 tablet by mouth twice daily 180 tablet 0    busPIRone (BUSPAR) 10 MG tablet Take 1 tablet by mouth twice daily 180 tablet 0    simvastatin (ZOCOR) 10 MG tablet Take 1 tablet by mouth nightly 90 tablet 0    Galcanezumab-gnlm (EMGALITY) 120 MG/ML SOAJ 1 injection every 30 days 1 pen 3    clonazePAM (KLONOPIN) 0.5 MG tablet Take 0.5 mg by mouth 2 times daily as needed.  SUMAtriptan (IMITREX) 25 MG tablet Take 1 tablet prn migraine 30 tablet 2    rOPINIRole (REQUIP) 0.25 MG tablet Take 1 tablet by mouth nightly 30 tablet 3    omeprazole (PRILOSEC) 40 MG delayed release capsule TAKE ONE CAPSULE BY MOUTH ONCE DAILY 30 capsule 5    fluticasone (FLONASE) 50 MCG/ACT nasal spray USE 1 SPRAY(S) IN EACH NOSTRIL ONCE DAILY 1 Bottle 5    topiramate (TOPAMAX) 50 MG tablet TAKE 1 TABLET BY MOUTH TWICE DAILY 60 tablet 5        Medications patient taking as of now reconciled against medications ordered at time of hospital discharge: Yes    Chief Complaint   Patient presents with    Follow-Up from Hospital     discharged on 5/8; still having more difficulty catching her breath; feels like her heart is racing at times       HPI    Inpatient course: Discharge summary reviewed- see chart. Interval history/Current status:     Patient presents today for hospital discharge follow-up. She was recently admitted to [facility] for acute respiratory failure with hypoxia. Patient had been battling pneumonia for approximately 5 weeks (was on 2-3 different antibiotics/steroids and not improving) when she went to the ER for shortness of breath. After testing she was found to have a large left-sided pleural effusion with pleural nodularity concerning for malignancy. Patient was placed in pulmonary ICU and a chest tube was placed. She had a biopsy of the left pleural masslike thickening. Pathology report showed a poorly differentiated adenocarcinoma from an unknown primary. Patient also underwent CT head.   She was having some confusion that had actually been present for the last several months. CT head when compared to CT done in February showed 2 small lytic lesions suspected metastasis. She underwent a bone scan which then  affirmed lesions suspicious for multiple myeloma or metastatic metastatic disease. Patient was discharged in stable condition with follow-up with oncology, pulmonology, neurology and neurosurgery. Patient did make decision to be DNR while in the hospital.  Since being home she has declined. Patient has had at least a 6 or 7 pound weight gain. She reports feeling extremely short of breath. When I entered exam room she was slumped over with head between her knees in obvious distress. She is currently wearing 3L O2; saturating at 92%. We increased oxygen to 4 L and were able to get her up to 93%. Patient is hypotensive in office. Blood pressure 82/50. Patient reports feeling dizzy and increased confusion. Patient sees pain management; she is currently taking 7.5 mg hydrocodone per pain management. She is completely out of hydrocodone at this time. I am not completely certain why she is out of her medication. I see that she told home health that she had medication missing after she returned home from the hospital.  Patient does tell me that she was taking an increased amount prior to going in the hospital because she was in so much pain. She is not due to get anymore until she sees them on 6/14/21. She is very tearful today and states she cannot go that much longer without pain medication. Patient lives alone. She has a son that helps her, but reports he is not reliable. She states she is having current discussions about moving in with him possibly. Review of Systems   Constitutional: Positive for appetite change and unexpected weight change. Negative for chills and fever. HENT: Negative for ear pain, hearing loss, rhinorrhea and voice change. Eyes: Negative for photophobia and visual disturbance. Respiratory: Positive for shortness of breath. Negative for cough. Cardiovascular: Negative for chest pain and palpitations. Gastrointestinal: Positive for abdominal distention. Negative for nausea and vomiting. Endocrine: Negative. Negative for cold intolerance and heat intolerance. Genitourinary: Negative for difficulty urinating and flank pain. Musculoskeletal: Positive for back pain. Negative for neck pain. Skin: Negative for color change and rash. Allergic/Immunologic: Negative for environmental allergies and food allergies. Neurological: Negative for dizziness, speech difficulty and headaches. Hematological: Does not bruise/bleed easily. Psychiatric/Behavioral: Negative for sleep disturbance and suicidal ideas. Vitals:    06/04/21 1121 06/04/21 1202 06/04/21 1223   BP: (!) 94/58 (!) 88/56 (!) 82/50   Pulse: 98     Resp: 18     Temp: 97 °F (36.1 °C)     TempSrc: Temporal     SpO2: 92% 93%    Weight: 176 lb 3.2 oz (79.9 kg)     Height: 5' 3\" (1.6 m)       Body mass index is 31.21 kg/m². Wt Readings from Last 3 Encounters:   06/04/21 176 lb 3.2 oz (79.9 kg)   05/27/21 170 lb 8 oz (77.3 kg)   02/23/21 168 lb (76.2 kg)     BP Readings from Last 3 Encounters:   06/04/21 (!) 82/50   05/28/21 100/69   02/23/21 138/84       Physical Exam  Vitals and nursing note reviewed. Constitutional:       Appearance: She is well-developed. She is ill-appearing. Comments: O2 nasal canula   HENT:      Head: Atraumatic. Right Ear: External ear normal.      Left Ear: External ear normal.      Nose: Nose normal.   Eyes:      Conjunctiva/sclera: Conjunctivae normal.      Pupils: Pupils are equal, round, and reactive to light. Cardiovascular:      Rate and Rhythm: Normal rate and regular rhythm. Heart sounds: Normal heart sounds, S1 normal and S2 normal.   Pulmonary:      Effort: Tachypnea and respiratory distress present. Breath sounds: Decreased air movement present.    Abdominal: General: Bowel sounds are normal.      Palpations: Abdomen is soft. Musculoskeletal:         General: Normal range of motion. Cervical back: Normal range of motion and neck supple. Skin:     General: Skin is warm and dry. Neurological:      Mental Status: She is alert and oriented to person, place, and time. Psychiatric:         Behavior: Behavior normal.             Assessment/Plan:  1. Hospital discharge follow-up    - OK DISCHARGE MEDS RECONCILED W/ CURRENT OUTPATIENT MED LIST    2. Hypoxia    3. Hypotension, unspecified hypotension type    4. Metastatic adenocarcinoma ([address]    I have called and given report to Vipul Bolaños at [facility] ED. I would like patient further evaluated with labs and possibly further imaging of chest to determine if pleural effusion worsening. She is hypotensive with significant weight gain over the last several days. Her pain is also not well controlled and I am hesitant to send her home short course of hydrocodone given respiratory distress status. Patient has obvious poor prognosis; she is palliative care patient at this time. I offered EMS to transport patient but she refused. Her daughter-in-law is present with her today and states that she will take her to the ED immediately. Patient escorted to her car via wheelchair. Medical Decision Making: high complexity      Will not qualify as TCM visit; patient was discharged from clinic to ED. Approximately 50 minutes spent with patient in review of history, labs, imaging and discussion of treatment plan.

## 2021-06-04 NOTE — ED PROVIDER NOTES
[facility] [address] [facility] CARE  eMERGENCY dEPARTMENT eNCOUnter      Pt Name: Rebeca Hamilton  MRN: [id number]  [place] 1965  Date of evaluation: 6/4/2021  Provider: Felicita Gregg MD    CHIEF COMPLAINT       Chief Complaint   Patient presents with    Hypotension     sent by PCP kirsten for hypotension    Shortness of Breath         HISTORY OF PRESENT ILLNESS   (Location/Symptom, Timing/Onset,Context/Setting, Quality, Duration, Modifying Factors, Severity)  Note limiting factors. Rebeca Hamilton is a 48 y.o. female who presents to the emergency department with hypotension and shortness of breath. Patient was seen in her primary care provider's office today. She was sent to the emergency department due to low blood pressures. Patient is also had shortness of breath which is again worsening. Patient was admitted to the hospital on 5/23/2021 after being diagnosed with pneumonia. She was further diagnosed with lung cancer with mets to her brain. Patient is using home oxygen but her oxygenation was lower than normal at PCPs office. Patient continues to have productive cough of yellow sputum. She is having subjective fevers. This is been ongoing for the last 1 week. Patient also has pain in her left lower chest \"where they put the tube in my lung to drain it. \"  Patient denies cough, congestion, vomiting, diarrhea, black or bloody stools, or lower extremity swelling or pain. Patient quit smoking while she was in the hospital.          HPI    NursingNotes were reviewed. REVIEW OF SYSTEMS    (2-9 systems for level 4, 10 or more for level 5)     Review of Systems   Constitutional: Positive for fatigue and fever (Subjective). Negative for chills. HENT: Negative for congestion and rhinorrhea. Eyes: Negative for discharge. Respiratory: Positive for cough and shortness of breath. Cardiovascular: Positive for chest pain and palpitations.    Gastrointestinal: Negative for abdominal pain, diarrhea, nausea and vomiting. Genitourinary: Negative for difficulty urinating and dysuria. Musculoskeletal: Negative for back pain and neck pain. Skin: Negative for color change and pallor. Neurological: Positive for headaches (\"From coughing so hard\"). Negative for syncope and light-headedness. Psychiatric/Behavioral: Negative for agitation and confusion. All other systems reviewed and are negative.            PAST MEDICALHISTORY     Past Medical History:   Diagnosis Date    Anxiety     Arthritis     Chronic tension-type headache, intractable 1/8/2016    COPD (chronic obstructive pulmonary disease) ([facility] [address] 6/4/2021    Depression     Headache(784.0)     migraine    Lupus ([facility] [address]     Metastatic lung cancer (metastasis from lung to other site) ([facility] [address] 6/4/2021    Neck pain 1/8/2016         SURGICAL HISTORY       Past Surgical History:   Procedure Laterality Date    COLONOSCOPY  10/23/2013    Dr Jenny Bonilla: internal hemorrhoids; hyperplastic polyps (5 yr recal for screening)    HYSTERECTOMY      NECK SURGERY  07/26/2016    Dr Siomara Carlos FLX DX W/COLLJ SPEC WHEN PFRMD N/A 2/6/2017    COLONOSCOPY DIAGNOSTIC OR SCREENING performed by [address] [place] [address] at [address] [facility] OR    NC EGD TRANSORAL BIOPSY SINGLE/MULTIPLE N/A 7/26/2018    Dr Roland/dilation over wire-[address] [place]-mild esophageal narrowing-Yas (-)    TUBAL LIGATION      TUMOR REMOVAL      right deltoid area    UPPER GASTROINTESTINAL ENDOSCOPY  11/06/2013    Dr Jenny Bonilla: normal    UPPER GASTROINTESTINAL ENDOSCOPY  7/26/2018    Dr Roland/dilation over wire-[address] [place]-mild esophageal narrowing-Yas (-)         CURRENT MEDICATIONS     Current Discharge Medication List      CONTINUE these medications which have NOT CHANGED    Details   azithromycin (ZITHROMAX) 500 MG tablet Take 500 mg by mouth daily      OXYGEN Inhale into the lungs      oxymetazoline (AFRIN) 0.05 % nasal spray 2 sprays by Nasal route 2 times daily      polyethylene glycol (GLYCOLAX) 17 g packet Take 17 g by mouth daily as needed for Constipation      HYDROcodone-acetaminophen (NORCO) 7.5-325 MG per tablet TAKE 1 TABLET BY MOUTH THREE TIMES DAILY MUST LAST 30 DAYS      NARCAN 4 MG/0.1ML LIQD nasal spray ADMINISTER A SINGLE SPRAY IN ONE NOSTRIL UPON SIGNS OF OPIOID OVERDOSE. CALL 911. REPEAT AFTER 3 MINUTES IF NO RESPONSE.      montelukast (SINGULAIR) 10 MG tablet Take 1 tablet by mouth nightly  Qty: 30 tablet, Refills: 3      gabapentin (NEURONTIN) 300 MG capsule Take 1 capsule by mouth 3 times daily for 30 days. Qty: 90 capsule, Refills: 0    Associated Diagnoses: Other chronic pain      propranolol (INDERAL) 10 MG tablet Take 1 tablet by mouth 2 times daily  Qty: 90 tablet, Refills: 3      docusate sodium (COLACE, DULCOLAX) 100 MG CAPS Take 100 mg by mouth daily  Qty: 30 capsule, Refills: 0      vitamin B-1 (THIAMINE) 100 MG tablet Take 1 tablet by mouth daily  Qty: 30 tablet, Refills: 3      ipratropium (ATROVENT HFA) 17 MCG/ACT inhaler Inhale 1 puff into the lungs 3 times daily  Qty: 1 Inhaler, Refills: 0      tiZANidine (ZANAFLEX) 2 MG tablet Take 1 tablet by mouth 3 times daily  Qty: 30 tablet, Refills: 0      ondansetron (ZOFRAN ODT) 4 MG disintegrating tablet Take 1 tablet by mouth every 8 hours as needed for Nausea or Vomiting  Qty: 20 tablet, Refills: 0      citalopram (CELEXA) 40 MG tablet Take 1 tablet by mouth once daily  Qty: 90 tablet, Refills: 0    Associated Diagnoses: Anxiety      sucralfate (CARAFATE) 1 GM tablet Take 1 tablet by mouth twice daily  Qty: 180 tablet, Refills: 0      busPIRone (BUSPAR) 10 MG tablet Take 1 tablet by mouth twice daily  Qty: 180 tablet, Refills: 0      simvastatin (ZOCOR) 10 MG tablet Take 1 tablet by mouth nightly  Qty: 90 tablet, Refills: 0      Galcanezumab-gnlm (EMGALITY) 120 MG/ML SOAJ 1 injection every 30 days  Qty: 1 pen, Refills: 3      clonazePAM (KLONOPIN) 0.5 MG tablet Take 0.5 mg by mouth 2 times daily as needed.       SUMAtriptan (IMITREX) 25 MG tablet Take 1 tablet prn migraine  Qty: 30 tablet, Refills: 2      rOPINIRole (REQUIP) 0.25 MG tablet Take 1 tablet by mouth nightly  Qty: 30 tablet, Refills: 3      omeprazole (PRILOSEC) 40 MG delayed release capsule TAKE ONE CAPSULE BY MOUTH ONCE DAILY  Qty: 30 capsule, Refills: 5    Comments: Please consider 90 day supplies to promote better adherence  Associated Diagnoses: Gastroesophageal reflux disease without esophagitis      fluticasone (FLONASE) 50 MCG/ACT nasal spray USE 1 SPRAY(S) IN EACH NOSTRIL ONCE DAILY  Qty: 1 Bottle, Refills: 5    Comments: Please consider 90 day supplies to promote better adherence      topiramate (TOPAMAX) 50 MG tablet TAKE 1 TABLET BY MOUTH TWICE DAILY  Qty: 60 tablet, Refills: 5    Comments: Please consider 90 day supplies to promote better adherence  Associated Diagnoses: Nonintractable headache, unspecified chronicity pattern, unspecified headache type      diclofenac sodium 1 % GEL Apply 2 g topically 4 times daily  Qty: 2 Tube, Refills: 1             ALLERGIES     Ventolin [albuterol]    FAMILY HISTORY       Family History   Problem Relation Age of Onset    Colon Cancer Maternal Aunt     Colon Polyps Maternal Aunt     Liver Cancer Neg Hx     Liver Disease Neg Hx     Rectal Cancer Neg Hx     Stomach Cancer Neg Hx           SOCIAL HISTORY       Social History     Socioeconomic History    Marital status: Single     Spouse name: Not on file    Number of children: Not on file    Years of education: Not on file    Highest education level: Not on file   Occupational History    Not on file   Tobacco Use    Smoking status: Current Every Day Smoker     Packs/day: 0.50     Years: 32.00     Pack years: 16.00     Types: Cigarettes, E-Cigarettes    Smokeless tobacco: Never Used   Vaping Use    Vaping Use: Every day    Substances: Nicotine   Substance and Sexual Activity    Alcohol use: No     Alcohol/week: 0.0 standard drinks    Drug use: No    Sexual activity: Not on file   Other Topics Concern    Not on file   Social History Narrative    Not on file     Social Determinants of Health     Financial Resource Strain: Low Risk     Difficulty of Paying Living Expenses: Not hard at all   Food Insecurity: No Food Insecurity    Worried About Running Out of Food in the Last Year: Never true    [address] in the Last Year: Never true   Transportation Needs: No Transportation Needs    Lack of Transportation (Medical): No    Lack of Transportation (Non-Medical): No   Physical Activity:     Days of Exercise per Week:     Minutes of Exercise per Session:    Stress:     Feeling of Stress :    Social Connections:     Frequency of Communication with Friends and Family:     Frequency of Social Gatherings with Friends and Family:     Attends [employer] Services:     Active Member of Clubs or Organizations:     Attends Club or Organization Meetings:     Marital Status:    Intimate Partner Violence:     Fear of Current or Ex-Partner:     Emotionally Abused:     Physically Abused:     Sexually Abused:        SCREENINGS             PHYSICAL EXAM    (up to 7 for level 4, 8 or more for level 5)     ED Triage Vitals [06/04/21 1237]   BP Temp Temp Source Pulse Resp SpO2 Height Weight   (!) 93/57 97.8 °F (36.6 °C) Tympanic 92 20 92 % -- --       Physical Exam  Vitals and nursing note reviewed. Constitutional:       General: She is not in acute distress. Appearance: She is ill-appearing. HENT:      Head: Normocephalic and atraumatic. Right Ear: External ear normal.      Left Ear: External ear normal.      Nose: Nose normal.      Mouth/Throat:      Mouth: Mucous membranes are moist.      Pharynx: Oropharynx is clear. No oropharyngeal exudate. Comments: Tacky mucous membranes      Eyes:      General: No scleral icterus. Conjunctiva/sclera: Conjunctivae normal.      Pupils: Pupils are equal, round, and reactive to light.    Cardiovascular:      Rate and Rhythm: Normal rate and regular rhythm. Pulses: Normal pulses. Heart sounds: Normal heart sounds. Pulmonary:      Effort: Pulmonary effort is normal. No respiratory distress. Breath sounds: Rhonchi (Left lower lobe) and rales (Bilateral) present. Chest:      Chest wall: Tenderness (Tenderness to palpation over left lower chest wall. Chest tube site is healing well without evidence of erythema or drainage.) present. Abdominal:      General: Bowel sounds are normal.      Palpations: Abdomen is soft. Tenderness: There is no abdominal tenderness. There is no guarding. Musculoskeletal:         General: No tenderness or deformity. Cervical back: Neck supple. No rigidity. Right lower leg: No edema. Left lower leg: No edema. Skin:     General: Skin is warm and dry. Capillary Refill: Capillary refill takes less than 2 seconds. Coloration: Skin is not jaundiced. Neurological:      General: No focal deficit present. Mental Status: She is alert and oriented to person, place, and time. Mental status is at baseline. Coordination: Coordination normal.   Psychiatric:         Mood and Affect: Mood normal.         Behavior: Behavior normal.         DIAGNOSTIC RESULTS     EKG: All EKG's areinterpreted by the Emergency Department Physician who either signs or Co-signs this chart in the absence of a cardiologist.    EKG dated 6/4/2021 at 1550 4 PM: Normal sinus rhythm, rate 91. Low voltage in 3 and aVF. RADIOLOGY:  Non-plain film images such as CT, Ultrasound and MRI are read by the radiologist. Plain radiographic images are visualized and preliminarily interpreted bythe emergency physician with the below findings:        CTA PULMONARY W CONTRAST   Final Result   1. No CT evidence of pulmonary embolus. 2. Mediastinal and left hilar lymphadenopathy. Subcarinal   lymphadenopathy. The adenopathy may have progressed some compared to   the prior study.    3. Metastatic disease involving the pleura on the left side. There are   loculated fluid collections on the left side that are likely related   to the malignant pleural disease. Infection/empyema is not fully ruled   out. 4. Diffuse infiltrate in the left lung may be infectious or   inflammatory. Post radiation pneumonitis is possible. Interstitial   spread of tumor is also in the differential.   5. Minimal right pleural effusion. Mild dependent atelectasis on the   right. 6. Fatty infiltration of the liver. Signed by Dr Dane Pozo on 6/4/2021 6:47 PM      XR CHEST PORTABLE   Final Result   1. Left-sided pleural reaction with left-sided interstitial and   airspace opacities with mid to lower lung consolidation. Differential   considerations include infectious/inflammatory change, pneumonia. 2. Cardiomegaly.    Signed by Dr Suraj Veloz on 6/4/2021 3:47 PM              LABS:  Labs Reviewed   CBC WITH AUTO DIFFERENTIAL - Abnormal; Notable for the following components:       Result Value    WBC 10.9 (*)     RBC 3.43 (*)     Hemoglobin 10.4 (*)     Hematocrit 33.5 (*)     MCHC 31.0 (*)     Platelets 865 (*)     MPV 9.0 (*)     Neutrophils % 77.8 (*)     Lymphocytes % 11.9 (*)     Neutrophils Absolute 8.5 (*)     Monocytes Absolute 1.00 (*)     All other components within normal limits   COMPREHENSIVE METABOLIC PANEL W/ REFLEX TO MG FOR LOW K - Abnormal; Notable for the following components:    Sodium 135 (*)     BUN 5 (*)     Calcium 8.2 (*)     Total Protein 6.2 (*)     Albumin 2.5 (*)     Alkaline Phosphatase 133 (*)     All other components within normal limits   URINE RT REFLEX TO CULTURE - Abnormal; Notable for the following components:    Urobilinogen, Urine 2.0 (*)     Leukocyte Esterase, Urine TRACE (*)     All other components within normal limits   PROCALCITONIN - Abnormal; Notable for the following components:    Procalcitonin 0.18 (*)     All other components within normal limits   BLOOD GAS, ARTERIAL - Abnormal; Notable for the following components:    pO2, Arterial 69.0 (*)     Hemoglobin, Art, Extended 9.9 (*)     All other components within normal limits   MICROSCOPIC URINALYSIS - Abnormal; Notable for the following components:    Bacteria, UA NEGATIVE (*)     Crystals, UA NEG (*)     All other components within normal limits   COVID-19, RAPID   CULTURE, BLOOD 1   CULTURE, BLOOD 2   TROPONIN   LIPASE   LACTIC ACID, PLASMA   POTASSIUM, WHOLE BLOOD   COMPREHENSIVE METABOLIC PANEL W/ REFLEX TO MG FOR LOW K   LACTIC ACID, PLASMA   CBC   PROTIME-INR       All other labs were within normal range or not returned as of this dictation. EMERGENCY DEPARTMENT COURSE and DIFFERENTIAL DIAGNOSIS/MDM:   Vitals:    Vitals:    06/04/21 2037 06/04/21 2038 06/04/21 2100 06/04/21 2138   BP:  89/63 120/81 122/76   Pulse:    108   Resp:    24   Temp:    98.8 °F (37.1 °C)   TempSrc:    Temporal   SpO2:  96% 96% 96%   Weight: 175 lb (79.4 kg)   175 lb (79.4 kg)   Height:    5' 3\" (1.6 m)       MDM  52-year-old female presents with low blood pressure and shortness of breath from her primary care provider's office. Lab, EKG, and radiology results reviewed. Discussed with Dr. Lorenzo Navarro, hospitalist, who will admit patient for further evaluation and treatment. CONSULTS:  PHARMACY TO DOSE VANCOMYCIN  PHARMACY TO DOSE VANCOMYCIN    PROCEDURES:  Unless otherwise noted below, none     Procedures    FINAL IMPRESSION      1. Septicemia ([address]    2. Pneumonia due to organism    3. Chest pain, unspecified type    4. Other specified hypotension          DISPOSITION/PLAN   DISPOSITION Admitted 06/04/2021 09:10:22 PM      PATIENT REFERRED TO:  No follow-up provider specified.     DISCHARGE MEDICATIONS:  Current Discharge Medication List             (Please note that portions of this note were completed with a voice recognition program.  Efforts were made to edit thedictations but occasionally words are mis-transcribed.)    Emanuel Trejo MD (electronically signed)  Attending Emergency Physician          Renetta Suarez MD  06/05/21 0315

## 2021-06-05 PROBLEM — J90 LOCULATED PLEURAL EFFUSION: Status: ACTIVE | Noted: 2021-06-05

## 2021-06-05 PROBLEM — J91.0 MALIGNANT PLEURAL EFFUSION: Status: ACTIVE | Noted: 2021-06-05

## 2021-06-05 LAB
ALBUMIN SERPL-MCNC: 2.7 G/DL (ref 3.5–5.2)
ALP BLD-CCNC: 134 U/L (ref 35–104)
ALT SERPL-CCNC: 7 U/L (ref 5–33)
ANION GAP SERPL CALCULATED.3IONS-SCNC: 13 MMOL/L (ref 7–19)
AST SERPL-CCNC: 15 U/L (ref 5–32)
BILIRUB SERPL-MCNC: <0.2 MG/DL (ref 0.2–1.2)
BUN BLDV-MCNC: 5 MG/DL (ref 6–20)
CALCIUM SERPL-MCNC: 8.3 MG/DL (ref 8.6–10)
CHLORIDE BLD-SCNC: 100 MMOL/L (ref 98–111)
CO2: 23 MMOL/L (ref 22–29)
CREAT SERPL-MCNC: 0.5 MG/DL (ref 0.5–0.9)
GFR AFRICAN AMERICAN: >59
GFR NON-AFRICAN AMERICAN: >60
GLUCOSE BLD-MCNC: 99 MG/DL (ref 74–109)
HCT VFR BLD CALC: 32.9 % (ref 37–47)
HEMOGLOBIN: 10.1 G/DL (ref 12–16)
INR BLD: 1.2 (ref 0.88–1.18)
LACTIC ACID: 1.2 MMOL/L (ref 0.5–1.9)
MCH RBC QN AUTO: 30.1 PG (ref 27–31)
MCHC RBC AUTO-ENTMCNC: 30.7 G/DL (ref 33–37)
MCV RBC AUTO: 98.2 FL (ref 81–99)
PDW BLD-RTO: 13 % (ref 11.5–14.5)
PLATELET # BLD: 527 K/UL (ref 130–400)
PMV BLD AUTO: 8.6 FL (ref 9.4–12.3)
POTASSIUM REFLEX MAGNESIUM: 4.1 MMOL/L (ref 3.5–5)
PROTHROMBIN TIME: 15.2 SEC (ref 12–14.6)
RBC # BLD: 3.35 M/UL (ref 4.2–5.4)
SODIUM BLD-SCNC: 136 MMOL/L (ref 136–145)
TOTAL PROTEIN: 6.1 G/DL (ref 6.6–8.7)
WBC # BLD: 14.8 K/UL (ref 4.8–10.8)

## 2021-06-05 PROCEDURE — 2580000003 HC RX 258: Performed by: INTERNAL MEDICINE

## 2021-06-05 PROCEDURE — 94640 AIRWAY INHALATION TREATMENT: CPT

## 2021-06-05 PROCEDURE — 6370000000 HC RX 637 (ALT 250 FOR IP): Performed by: INTERNAL MEDICINE

## 2021-06-05 PROCEDURE — 85610 PROTHROMBIN TIME: CPT

## 2021-06-05 PROCEDURE — 99255 IP/OBS CONSLTJ NEW/EST HI 80: CPT | Performed by: INTERNAL MEDICINE

## 2021-06-05 PROCEDURE — 2500000003 HC RX 250 WO HCPCS: Performed by: INTERNAL MEDICINE

## 2021-06-05 PROCEDURE — 2140000000 HC CCU INTERMEDIATE R&B

## 2021-06-05 PROCEDURE — 85027 COMPLETE CBC AUTOMATED: CPT

## 2021-06-05 PROCEDURE — 2700000000 HC OXYGEN THERAPY PER DAY

## 2021-06-05 PROCEDURE — 99253 IP/OBS CNSLTJ NEW/EST LOW 45: CPT | Performed by: THORACIC SURGERY (CARDIOTHORACIC VASCULAR SURGERY)

## 2021-06-05 PROCEDURE — 6360000002 HC RX W HCPCS: Performed by: INTERNAL MEDICINE

## 2021-06-05 PROCEDURE — 83605 ASSAY OF LACTIC ACID: CPT

## 2021-06-05 PROCEDURE — 80053 COMPREHEN METABOLIC PANEL: CPT

## 2021-06-05 PROCEDURE — 36415 COLL VENOUS BLD VENIPUNCTURE: CPT

## 2021-06-05 RX ORDER — POLYETHYLENE GLYCOL 3350 17 G/17G
17 POWDER, FOR SOLUTION ORAL DAILY PRN
Status: DISCONTINUED | OUTPATIENT
Start: 2021-06-05 | End: 2021-06-06 | Stop reason: HOSPADM

## 2021-06-05 RX ORDER — ALPRAZOLAM 0.25 MG/1
0.25 TABLET ORAL 3 TIMES DAILY PRN
Status: DISCONTINUED | OUTPATIENT
Start: 2021-06-05 | End: 2021-06-06 | Stop reason: HOSPADM

## 2021-06-05 RX ADMIN — BUSPIRONE HYDROCHLORIDE 5 MG: 5 TABLET ORAL at 20:37

## 2021-06-05 RX ADMIN — SODIUM CHLORIDE, PRESERVATIVE FREE 2000 MG: 5 INJECTION INTRAVENOUS at 08:23

## 2021-06-05 RX ADMIN — FAMOTIDINE 20 MG: 10 INJECTION, SOLUTION INTRAVENOUS at 08:25

## 2021-06-05 RX ADMIN — FAMOTIDINE 20 MG: 10 INJECTION, SOLUTION INTRAVENOUS at 19:42

## 2021-06-05 RX ADMIN — ENOXAPARIN SODIUM 40 MG: 40 INJECTION SUBCUTANEOUS at 08:24

## 2021-06-05 RX ADMIN — METHYLPREDNISOLONE SODIUM SUCCINATE 40 MG: 40 INJECTION, POWDER, FOR SOLUTION INTRAMUSCULAR; INTRAVENOUS at 06:01

## 2021-06-05 RX ADMIN — ROPINIROLE HYDROCHLORIDE 0.25 MG: 0.5 TABLET, FILM COATED ORAL at 00:05

## 2021-06-05 RX ADMIN — SODIUM CHLORIDE, PRESERVATIVE FREE 2000 MG: 5 INJECTION INTRAVENOUS at 19:42

## 2021-06-05 RX ADMIN — TIZANIDINE 2 MG: 4 TABLET ORAL at 15:14

## 2021-06-05 RX ADMIN — CITALOPRAM HYDROBROMIDE 40 MG: 20 TABLET ORAL at 08:29

## 2021-06-05 RX ADMIN — TIZANIDINE 2 MG: 4 TABLET ORAL at 00:06

## 2021-06-05 RX ADMIN — Medication 2 MG: at 20:44

## 2021-06-05 RX ADMIN — FAMOTIDINE 20 MG: 10 INJECTION, SOLUTION INTRAVENOUS at 00:05

## 2021-06-05 RX ADMIN — THIAMINE HCL TAB 100 MG 100 MG: 100 TAB at 08:30

## 2021-06-05 RX ADMIN — TIZANIDINE 2 MG: 4 TABLET ORAL at 20:37

## 2021-06-05 RX ADMIN — VANCOMYCIN HYDROCHLORIDE 1250 MG: 10 INJECTION, POWDER, LYOPHILIZED, FOR SOLUTION INTRAVENOUS at 20:37

## 2021-06-05 RX ADMIN — IPRATROPIUM BROMIDE 0.5 MG: 0.5 SOLUTION RESPIRATORY (INHALATION) at 18:40

## 2021-06-05 RX ADMIN — GABAPENTIN 300 MG: 300 CAPSULE ORAL at 08:29

## 2021-06-05 RX ADMIN — CLONAZEPAM 0.5 MG: 0.5 TABLET ORAL at 00:06

## 2021-06-05 RX ADMIN — GABAPENTIN 300 MG: 300 CAPSULE ORAL at 15:14

## 2021-06-05 RX ADMIN — METHYLPREDNISOLONE SODIUM SUCCINATE 40 MG: 40 INJECTION, POWDER, FOR SOLUTION INTRAMUSCULAR; INTRAVENOUS at 22:58

## 2021-06-05 RX ADMIN — BUSPIRONE HYDROCHLORIDE 5 MG: 5 TABLET ORAL at 00:06

## 2021-06-05 RX ADMIN — Medication 2 MG: at 02:33

## 2021-06-05 RX ADMIN — TOPIRAMATE 50 MG: 50 TABLET, FILM COATED ORAL at 20:37

## 2021-06-05 RX ADMIN — MIDODRINE HYDROCHLORIDE 10 MG: 10 TABLET ORAL at 17:01

## 2021-06-05 RX ADMIN — MIDODRINE HYDROCHLORIDE 10 MG: 10 TABLET ORAL at 11:32

## 2021-06-05 RX ADMIN — BUDESONIDE 500 MCG: 0.5 SUSPENSION RESPIRATORY (INHALATION) at 18:40

## 2021-06-05 RX ADMIN — BUSPIRONE HYDROCHLORIDE 5 MG: 5 TABLET ORAL at 08:30

## 2021-06-05 RX ADMIN — MIDODRINE HYDROCHLORIDE 10 MG: 10 TABLET ORAL at 08:29

## 2021-06-05 RX ADMIN — MONTELUKAST 10 MG: 10 TABLET, FILM COATED ORAL at 20:36

## 2021-06-05 RX ADMIN — DOCUSATE SODIUM 100 MG: 100 CAPSULE, LIQUID FILLED ORAL at 08:30

## 2021-06-05 RX ADMIN — ROPINIROLE HYDROCHLORIDE 0.25 MG: 0.5 TABLET, FILM COATED ORAL at 20:36

## 2021-06-05 RX ADMIN — BUDESONIDE 500 MCG: 0.5 SUSPENSION RESPIRATORY (INHALATION) at 06:27

## 2021-06-05 RX ADMIN — TIZANIDINE 2 MG: 4 TABLET ORAL at 08:29

## 2021-06-05 RX ADMIN — Medication 2 MG: at 13:41

## 2021-06-05 RX ADMIN — ALPRAZOLAM 0.25 MG: 0.25 TABLET ORAL at 20:46

## 2021-06-05 RX ADMIN — TOPIRAMATE 50 MG: 50 TABLET, FILM COATED ORAL at 08:30

## 2021-06-05 RX ADMIN — GABAPENTIN 300 MG: 300 CAPSULE ORAL at 00:05

## 2021-06-05 RX ADMIN — CLONAZEPAM 0.5 MG: 0.5 TABLET ORAL at 08:29

## 2021-06-05 RX ADMIN — Medication 2 MG: at 06:01

## 2021-06-05 RX ADMIN — MONTELUKAST 10 MG: 10 TABLET, FILM COATED ORAL at 00:06

## 2021-06-05 RX ADMIN — GABAPENTIN 300 MG: 300 CAPSULE ORAL at 20:36

## 2021-06-05 RX ADMIN — LEVOFLOXACIN 500 MG: 5 INJECTION, SOLUTION INTRAVENOUS at 00:07

## 2021-06-05 RX ADMIN — Medication 2 MG: at 23:46

## 2021-06-05 RX ADMIN — METHYLPREDNISOLONE SODIUM SUCCINATE 40 MG: 40 INJECTION, POWDER, FOR SOLUTION INTRAMUSCULAR; INTRAVENOUS at 15:14

## 2021-06-05 RX ADMIN — IPRATROPIUM BROMIDE 0.5 MG: 0.5 SOLUTION RESPIRATORY (INHALATION) at 06:28

## 2021-06-05 RX ADMIN — VANCOMYCIN HYDROCHLORIDE 1250 MG: 10 INJECTION, POWDER, LYOPHILIZED, FOR SOLUTION INTRAVENOUS at 08:23

## 2021-06-05 RX ADMIN — TOPIRAMATE 50 MG: 50 TABLET, FILM COATED ORAL at 00:06

## 2021-06-05 RX ADMIN — ALPRAZOLAM 0.25 MG: 0.25 TABLET ORAL at 14:00

## 2021-06-05 RX ADMIN — IPRATROPIUM BROMIDE 0.5 MG: 0.5 SOLUTION RESPIRATORY (INHALATION) at 14:17

## 2021-06-05 RX ADMIN — LEVOFLOXACIN 500 MG: 5 INJECTION, SOLUTION INTRAVENOUS at 22:58

## 2021-06-05 RX ADMIN — CLONAZEPAM 0.5 MG: 0.5 TABLET ORAL at 20:36

## 2021-06-05 RX ADMIN — IPRATROPIUM BROMIDE 0.5 MG: 0.5 SOLUTION RESPIRATORY (INHALATION) at 11:01

## 2021-06-05 ASSESSMENT — PAIN SCALES - GENERAL
PAINLEVEL_OUTOF10: 10
PAINLEVEL_OUTOF10: 9
PAINLEVEL_OUTOF10: 6
PAINLEVEL_OUTOF10: 6
PAINLEVEL_OUTOF10: 7
PAINLEVEL_OUTOF10: 0
PAINLEVEL_OUTOF10: 9
PAINLEVEL_OUTOF10: 3

## 2021-06-05 ASSESSMENT — PAIN DESCRIPTION - PROGRESSION: CLINICAL_PROGRESSION: NOT CHANGED

## 2021-06-05 ASSESSMENT — PAIN DESCRIPTION - PAIN TYPE: TYPE: ACUTE PAIN

## 2021-06-05 ASSESSMENT — PAIN DESCRIPTION - FREQUENCY: FREQUENCY: CONTINUOUS

## 2021-06-05 ASSESSMENT — PAIN - FUNCTIONAL ASSESSMENT: PAIN_FUNCTIONAL_ASSESSMENT: PREVENTS OR INTERFERES SOME ACTIVE ACTIVITIES AND ADLS

## 2021-06-05 ASSESSMENT — PAIN DESCRIPTION - ONSET: ONSET: ON-GOING

## 2021-06-05 ASSESSMENT — PAIN DESCRIPTION - LOCATION: LOCATION: RIB CAGE

## 2021-06-05 ASSESSMENT — PAIN DESCRIPTION - DESCRIPTORS: DESCRIPTORS: ACHING

## 2021-06-05 ASSESSMENT — PAIN DESCRIPTION - ORIENTATION: ORIENTATION: LEFT

## 2021-06-05 NOTE — PROGRESS NOTES
[facility]        Hospitalist Progress Note  6/5/2021 2:17 PM  Subjective:   Admit Date: 6/4/2021  PCP: JERILYN Narayan    Chief Complaint: Worsening dyspnea; pain; hypotension at PCP office    Subjective: Patient seen and examined at bedside. Tearful. Dyspneic. Denies chest pain. Cumulative Hospital History: 60-year-old obese female with a past medical history of lupus, anxiety, migraines recently admitted (discharged 5/28/21) for dyspnea found to have large left-sided pleural effusion with pleural nodularity concerning for a malignancy with chest tube placed with drainage of left pleural fluid. Patient also status post CT-guided biopsy of left pleural masslike thickening performed 5/27/2021. Oncology on board and pathology now showing metastatic poorly differentiated adenocarcinoma. Patient with lytic lesions on CT head with some intermittent confusion; EEG showing findings of encephalopathy and MRI brain showing enhancing skull lesion suspicious for osseous metastasis. Patient wishes to be DNR/DNI after goals of care discussion. She was sent home with 4L of home oxygen at that time. Patient presented to her PCPs office for follow-up and she was noted to be hypotensive, more dyspneic and in pain; she was subsequently sent to the emergency department for further evaluation. She was found to have a loculated left sided pleural effusion with associated worsening dyspnea and was admitted for further management. CT surgery, oncology have been consulted. ROS: 14 point review of systems is negative except as specifically addressed above.     ADULT DIET; Easy to Chew    Intake/Output Summary (Last 24 hours) at 6/5/2021 1417  Last data filed at 6/5/2021 7402  Gross per 24 hour   Intake 470.05 ml   Output --   Net 470.05 ml     Medications:   sodium chloride       Current Facility-Administered Medications   Medication Dose Route Frequency Provider Last Rate Last Admin    polyethylene glycol chloride infusion  25 mL Intravenous PRN Alma Shabazz MD        enoxaparin (LOVENOX) injection 40 mg  40 mg Subcutaneous Daily Alma Shabazz MD   40 mg at 06/05/21 0824    polyethylene glycol (GLYCOLAX) packet 17 g  17 g Oral Daily PRN Alma Shabazz MD        acetaminophen (TYLENOL) tablet 650 mg  650 mg Oral Q6H PRN Alma Shabazz MD        Or    acetaminophen (TYLENOL) suppository 650 mg  650 mg Rectal Q6H PRN Alma Shabazz MD        potassium chloride (KLOR-CON M) extended release tablet 40 mEq  40 mEq Oral PRN Alma Shabazz, MD        Or    potassium bicarb-citric acid (EFFER-K) effervescent tablet 40 mEq  40 mEq Oral PRN Alma Shabazz, MD        Or    potassium chloride 10 mEq/100 mL IVPB (Peripheral Line)  10 mEq Intravenous PRN Alma Shabazz MD        potassium chloride 10 mEq/100 mL IVPB (Peripheral Line)  10 mEq Intravenous PRN Alma Shabazz MD        magnesium sulfate 2000 mg in 50 mL IVPB premix  2,000 mg Intravenous PRN Alma Shabazz MD        ondansetron (ZOFRAN) injection 4 mg  4 mg Intravenous Q6H PRN Alma Shabazz MD        famotidine (PEPCID) injection 20 mg  20 mg Intravenous BID Alma Shabazz MD   20 mg at 06/05/21 0825    nicotine (NICODERM CQ) 21 MG/24HR 1 patch  1 patch Transdermal Daily Alma Shabazz MD   1 patch at 06/05/21 0828    ceFEPIme (MAXIPIME) 2,000 mg in sodium chloride (PF) 20 mL IV syringe  2,000 mg Intravenous Q12H Alma Shabazz MD   2,000 mg at 06/05/21 0823    levoFLOXacin (LEVAQUIN) 500 MG/100ML infusion 500 mg  500 mg Intravenous Q24H Alma Shabazz MD   Stopped at 06/05/21 0107    methylPREDNISolone sodium (SOLU-MEDROL) injection 40 mg  40 mg Intravenous Q8H Alma Shabazz MD   40 mg at 06/05/21 0601    morphine injection 2 mg  2 mg Intravenous Q3H PRN Alma Shabazz MD   2 mg at 06/05/21 1341    motor function intact  Skin: warm    Assessment and Plan:   Principal Problem:    Loculated pleural effusion  Active Problems:    Depression    Cervical radiculopathy    Hypoesthesia of skin    Cigarette nicotine dependence with nicotine-induced disorder    Palliative care patient    Metastatic lung cancer (metastasis from lung to other site) (HCC)    Pain syndrome, chronic    Adenocarcinoma of left lung, stage 4 (HCC)    Anorexia    Tobacco abuse    COPD (chronic obstructive pulmonary disease) (HCC)    Hypoxemia    Dyspnea  Resolved Problems:    * No resolved hospital problems. *    Loculated pleural effusion/metastatic adenocarcinoma/COPD: CT surgery consult. Oncology consult. Antibiotics. Bronchodilators. As needed pain management. Depression/Anxiety: Home medications continued. I have had an extensive discussion with the patient at bedside with her mother on the phone. Patient is DNR/DNI. She is strongly considering comfort care/hospice at this time but she does want the fluid removed from her lungs so she can breathe better knowing that it would likely come back. Palliative care consulted.     Advance Directive: DNR-CC    DVT prophylaxis: Lovenox    Discharge planning: TBD      Signed:  Giselle Capmos MD 6/5/2021 2:17 PM  Rounding Hospitalist

## 2021-06-05 NOTE — CONSULTS
ONCOLOGY CONSULTATION:      Reason for Consult: Stage IV adenocarcinoma of the lung    Requesting Physician: Dr. Mindy Lindsey:    Chief Complaint   Patient presents with    Hypotension     sent by PCP kirsten for hypotension    Shortness of Breath         History Obtained From:  History obtained from electronic medical record, patient and her son Dorene Nolan:    Ingrid Colorado is a 56-year old  female with a diagnosis of 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 TUMOR HISTORY: Stage IV metastatic poorly differentiated adenocarcinoma of the lung 5/27/2021  Ingrid Colorado was seen in initial oncology consultation by Dr. Karina Castillo on 5/27/2021 as an inpatient at [facility] with a large left pleural effusion with left lung consolidation, hilar/mediastinal adenopathy, concerning for malignancy. She was also seen by pulmonologist,  . A transthoracic needle biopsy of a left parietal pleural-based mass was performed on 5/27/2021. Pathology documented metastatic poorly differentiated adenocarcinoma. Ingrid Colorado was discharged to be followed up in outpatient setting to complete the metastatic work-up and for further decision-making. She returned due to increasing symptoms for evaluation. CTA of the chest on 6/4/2021 compared to CT scan from 5/24/2021 documented the following:  · No evidence of pulmonary embolus  · Mediastinal, left hilar, subcarinal lymphadenopathy with progression compared to the previous scan only 2 weeks prior. · Metastatic disease involving the left pleura. Small right pleural effusion with associated atelectasis.   · Loculated fluid collections on the left  · Diffuse infiltrate in the left lung consistent with carcinomatosis  · Fatty infiltration of the liver    Ingrid Colorado was seen in evaluation with her son Ingeabby Neves present as well as JERILYN Sullivan. Her clinical and radiographic picture was discussed at length with Dr. Petrona Zarco. Her CTA of the chest from 6/4/2021 was personally reviewed by me and disclosed and shown to her son and family at their request.     ASSESSMENT RECOMMENDATION AND PLAN:  Discussion undertaken with the patient and her son regarding potential options including Pleurx catheter placement, port placement and systemic therapy that could include chemotherapy and/or immunotherapy. Damon Rea and her son Inge Neves make it abundantly clear that they do not desire interventions of any sort but as previously discussed with other clinicians are requesting to be sent home with hospice for comfort measures only  She does not desire surgical interventions at this time. As discussed with Dr. Petrona Zarco, if symptoms became overwhelming, a Pleurx catheter could be placed and this could be done in the outpatient setting. They did request and desire a consultation with hospice so that they could be involved immediately with management. This is reasonable and appropriate. Consultation is placed.             Past Medical History:    Past Medical History:   Diagnosis Date    Anxiety     Arthritis     Chronic tension-type headache, intractable 1/8/2016    COPD (chronic obstructive pulmonary disease) ([facility] [address] 6/4/2021    Depression     Headache(784.0)     migraine    Lupus (HCC)     Metastatic lung cancer (metastasis from lung to other site) ([facility] [address] 6/4/2021    Neck pain 1/8/2016         Past Surgical History:    Past Surgical History:   Procedure Laterality Date    COLONOSCOPY  10/23/2013    Dr Negro Reyes: internal hemorrhoids; hyperplastic polyps (5 yr recal for screening)    HYSTERECTOMY      NECK SURGERY  07/26/2016    Dr Fly Crabtree FLX DX W/TARUN Zamarripa 1977 PFRMD N/A 2/6/2017    COLONOSCOPY DIAGNOSTIC OR SCREENING performed by Bruno Vazquez DO at [facility] OR    CT EGD TRANSORAL BIOPSY SINGLE/MULTIPLE N/A 7/26/2018    Dr Mandel-w/dilation over wire-51 [place]-mild esophageal narrowing-Yas (-)    TUBAL LIGATION      TUMOR REMOVAL      right deltoid area    UPPER GASTROINTESTINAL ENDOSCOPY  11/06/2013    Dr Alisson Telles: normal    UPPER GASTROINTESTINAL ENDOSCOPY  7/26/2018    Dr Roland/dilation over wire-51 [place]-mild esophageal narrowing-Yas (-)         Immunizations:    Immunization History   Administered Date(s) Administered    Influenza 10/22/2013    Influenza Virus Vaccine 12/01/2015    Influenza, Hyacinth Aas, IM, (6 mo and older Fluzone, Flulaval, Fluarix and 3 yrs and older Afluria) 11/10/2017    Influenza, Quadv, IM, PF (6 mo and older Fluzone, Flulaval, Fluarix, and 3 yrs and older Afluria) 01/19/2017, 01/15/2020    Pneumococcal Polysaccharide ([id number]) 12/01/2015    Zoster Recombinant (Shingrix) 01/15/2020         Current Hospital Medications:    Current Facility-Administered Medications   Medication Dose Route Frequency Provider Last Rate Last Admin    polyethylene glycol (GLYCOLAX) packet 17 g  17 g Oral Daily PRN Kristina Armenta MD        ALPRAZolam Ramona Loya) tablet 0.25 mg  0.25 mg Oral TID PRN Kaycee Cole MD   0.25 mg at 06/05/21 1400    albumin human 25 % IV solution 25 g  25 g Intravenous Once Kristina Armenta MD        0.9 % sodium chloride bolus  500 mL Intravenous Once Kristina Armenta MD        midodrine (PROAMATINE) tablet 10 mg  10 mg Oral TID WC Kristina Armenta MD   10 mg at 06/05/21 1132    vancomycin (VANCOCIN) 1,250 mg in dextrose 5 % 250 mL IVPB  1,250 mg Intravenous Q12H Kristina Armenta MD   Stopped at 06/05/21 0953    rOPINIRole (REQUIP) tablet 0.25 mg  0.25 mg Oral Nightly Kristina Armenta MD   0.25 mg at 06/05/21 0005    topiramate (TOPAMAX) tablet 50 mg  50 mg Oral BID Kristina Armenta MD   50 mg at 06/05/21 0830    citalopram (CELEXA) tablet 40 mg  40 mg Oral Daily Kristina Armenta MD   40 mg at 06/05/21 0829    busPIRone (BUSPAR) tablet 5 mg  5 mg Oral BID Mina Augustine MD   5 mg at 06/05/21 0830    montelukast (SINGULAIR) tablet 10 mg  10 mg Oral Nightly Mina Augustine MD   10 mg at 06/05/21 0006    gabapentin (NEURONTIN) capsule 300 mg  300 mg Oral TID Mina Augustine MD   300 mg at 06/05/21 1514    docusate sodium (COLACE) capsule 100 mg  100 mg Oral Daily Mina Augustine MD   100 mg at 06/05/21 0830    thiamine mononitrate tablet 100 mg  100 mg Oral Daily Mina Augustine MD   100 mg at 06/05/21 0830    tiZANidine (ZANAFLEX) tablet 2 mg  2 mg Oral TID Mina Augustine MD   2 mg at 06/05/21 1514    clonazePAM (KLONOPIN) tablet 0.5 mg  0.5 mg Oral BID Mina Augustine MD   0.5 mg at 06/05/21 0829    sodium chloride flush 0.9 % injection 5-40 mL  5-40 mL Intravenous 2 times per day Mina Augustine MD   10 mL at 06/04/21 2307    sodium chloride flush 0.9 % injection 5-40 mL  5-40 mL Intravenous PRN Mina Augustine MD        0.9 % sodium chloride infusion  25 mL Intravenous PRN Mina Augustine MD        enoxaparin (LOVENOX) injection 40 mg  40 mg Subcutaneous Daily Mina Augustine MD   40 mg at 06/05/21 0824    polyethylene glycol (GLYCOLAX) packet 17 g  17 g Oral Daily PRN Mina Augustine MD        acetaminophen (TYLENOL) tablet 650 mg  650 mg Oral Q6H PRN Mina Augustine MD        Or    acetaminophen (TYLENOL) suppository 650 mg  650 mg Rectal Q6H PRN Mina Augustine MD        potassium chloride (KLOR-CON M) extended release tablet 40 mEq  40 mEq Oral PRN Mina Augustine MD        Or    potassium bicarb-citric acid (EFFER-K) effervescent tablet 40 mEq  40 mEq Oral PRN Mina Augustine MD        Or    potassium chloride 10 mEq/100 mL IVPB (Peripheral Line)  10 mEq Intravenous PRN Mina Augustine MD        potassium chloride 10 mEq/100 mL IVPB (Peripheral Line)  10 mEq Intravenous PRN Conan Baumgarten, MD        magnesium sulfate 2000 mg in 50 mL IVPB premix  2,000 mg Intravenous PRN Conan Baumgarten, MD        ondansetron (ZOFRAN) injection 4 mg  4 mg Intravenous Q6H PRN Conan Baumgarten, MD        famotidine (PEPCID) injection 20 mg  20 mg Intravenous BID Conan Baumgarten, MD   20 mg at 06/05/21 0825    nicotine (NICODERM CQ) 21 MG/24HR 1 patch  1 patch Transdermal Daily Conan Baumgarten, MD   1 patch at 06/05/21 0828    ceFEPIme (MAXIPIME) 2,000 mg in sodium chloride (PF) 20 mL IV syringe  2,000 mg Intravenous Q12H Conan Baumgarten, MD   2,000 mg at 06/05/21 0823    levoFLOXacin (LEVAQUIN) 500 MG/100ML infusion 500 mg  500 mg Intravenous Q24H Conan Baumgarten, MD   Stopped at 06/05/21 0107    methylPREDNISolone sodium (SOLU-MEDROL) injection 40 mg  40 mg Intravenous Q8H Conan Baumgarten, MD   40 mg at 06/05/21 1514    morphine injection 2 mg  2 mg Intravenous Q3H PRN Conan Baumgarten, MD   2 mg at 06/05/21 1341    ipratropium (ATROVENT) 0.02 % nebulizer solution 0.5 mg  0.5 mg Nebulization Q4H WA Conan Baumgarten, MD   0.5 mg at 06/05/21 1417    budesonide (PULMICORT) nebulizer suspension 500 mcg  0.5 mg Nebulization BID Conan Baumgarten, MD   500 mcg at 06/05/21 5326    vancomycin (VANCOCIN) intermittent dosing (placeholder)   Other Alberto Magallanes MD             Allergies:    Allergies   Allergen Reactions    Ventolin [Albuterol] Swelling         Social History:    Social History     Socioeconomic History    Marital status: Single     Spouse name: Not on file    Number of children: Not on file    Years of education: Not on file    Highest education level: Not on file   Occupational History    Not on file   Tobacco Use    Smoking status: Current Every Day Smoker     Packs/day: 0.50     Years: 32.00     Pack years: 16.00     Types: Cigarettes, E-Cigarettes    Smokeless tobacco: Never Used   Vaping Use    Vaping Use: Every day    Substances: Nicotine   Substance and Sexual Activity    Alcohol use: No     Alcohol/week: 0.0 standard drinks    Drug use: No    Sexual activity: Not on file   Other Topics Concern    Not on file   Social History Narrative    Not on file     Social Determinants of Health     Financial Resource Strain: Low Risk     Difficulty of Paying Living Expenses: Not hard at all   Food Insecurity: No Food Insecurity    Worried About Running Out of Food in the Last Year: Never true    Jenn of Food in the Last Year: Never true   Transportation Needs: No Transportation Needs    Lack of Transportation (Medical): No    Lack of Transportation (Non-Medical): No   Physical Activity:     Days of Exercise per Week:     Minutes of Exercise per Session:    Stress:     Feeling of Stress :    Social Connections:     Frequency of Communication with Friends and Family:     Frequency of Social Gatherings with Friends and Family:     Attends Restoration Services:     Active Member of Clubs or Organizations:     Attends Club or Organization Meetings:     Marital Status:    Intimate Partner Violence:     Fear of Current or Ex-Partner:     Emotionally Abused:     Physically Abused:     Sexually Abused:          Family History:   Family History   Problem Relation Age of Onset    Colon Cancer Maternal Aunt     Colon Polyps Maternal Aunt     Liver Cancer Neg Hx     Liver Disease Neg Hx     Rectal Cancer Neg Hx     Stomach Cancer Neg Hx        Review of Systems:  Constitutional: Fatigue, cough and dyspnea  HENT: Negative for congestion, hearing loss, nosebleeds or sore throat. Eyes: Negative for photophobia, pain, discharge, redness and visual disturbance. Respiratory: Dyspnea  Cardiovascular: Negative for chest pain, palpitations or leg swelling.    Gastrointestinal: Negative for abdominal pain, blood in stool, constipation, diarrhea, nausea or vomiting. Genitourinary: Negative for dysuria, flank pain, frequency, hematuria or urgency. Musculoskeletal: Negative for back pain, joint swelling, myalgias or neck pain. Skin: Negative for rash or petechiae. Neurological: Negative for tremors, seizures, syncope, weakness or headaches. Hematological: No active bruising or bleeding. Psychiatric/Behavioral: Negative for hallucinations. Objective:  Vitals:    Vitals:    06/05/21 1315   BP: 103/69   Pulse: 75   Resp: 18   Temp: 96.3 °F (35.7 °C)   SpO2: 94%       Physical Exam   Constitutional: Oriented to person, place, and time. No acute distress. Head: Normocephalic and atraumatic. Nose: Nose normal.   Mouth/Throat: Oropharynx is clear and moist. No oropharyngeal exudate. Eyes: Pupils are equal and round. Conjunctivae and EOM are normal. No scleral icterus. Neck: Normal range of motion. Neck supple. No JVD. No appreciable thyromegaly. Cardiovascular: Normal rate, regular rhythm, normal heart sounds and intact distal pulses. Exam reveals no gallop, murmurs or friction rub. Pulmonary/Chest: Decreased breath sounds on the left with scattered rales and mild wheezing diffusely otherwise. Abdominal: Soft. Bowel sounds are normal. No organomegally or masses. No tenderness. There is no rebound and no guarding. Musculoskeletal: Normal range of motion. No edema or tenderness. Lymphadenopathy: No cervical, axillary or inguinal lymphadenopathy. Neurological: Alert and oriented to person, place, and time. Cranial nerves are intact. Neurological exam is nonfocal  Skin: Skin is warm and dry. No rash noted. No erythema. No pallor.    Psychiatric: Judgment normal.         DATA:    Labs:  General Labs:  CBC:   Lab Results   Component Value Date    WBC 14.8 (H) 06/05/2021    HGB 10.1 (L) 06/05/2021    HCT 32.9 (L) 06/05/2021    MCV 98.2 06/05/2021     (H) 06/05/2021       CMP:    Lab Results   Component Value Date    NA 136 06/05/2021    K 4.1 06/05/2021     06/05/2021    CO2 23 06/05/2021    BUN 5 (L) 06/05/2021    CREATININE 0.5 06/05/2021    GLUCOSE 99 06/05/2021    CALCIUM 8.3 (L) 06/05/2021    PROT 6.1 (L) 06/05/2021    LABALBU 2.7 (L) 06/05/2021    BILITOT <0.2 06/05/2021    ALKPHOS 134 (H) 06/05/2021    AST 15 06/05/2021    ALT 7 06/05/2021    LABGLOM >60 06/05/2021    GFRAA >59 06/05/2021    AGRATIO 1.8 06/04/2018    GLOB 2.4 06/04/2018         30 Day lookback of cultures:    Blood Culture Recent:   Recent Labs     05/23/21  0730   BC No growth after 5 days of incubation. Gram Stain Recent:   Recent Labs     05/23/21  1430   LABGRAM Moderate WBC's (Polymorphonuclear)  No organisms seen       Resp Culture Recent: No results for input(s): CULTRESP in the last 720 hours. Body Fluid Recent : No results for input(s): BFCX in the last 720 hours. MRSA Recent : No results for input(s): [facility] in the last 720 hours. Urine Culture Recent : No results for input(s): LABURIN in the last 720 hours. Organism Recent : No results for input(s): ORG in the last 720 hours. IMPRESSION/RECOMMENDATIONS:    Damon Rea is a 56-year old  female with a diagnosis of 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 TUMOR HISTORY: Stage IV metastatic poorly differentiated adenocarcinoma of the lung 5/27/2021  Damon Rea was seen in initial oncology consultation by Dr. Papa Wolfe on 5/27/2021 as an inpatient at [facility] with a large left pleural effusion with left lung consolidation, hilar/mediastinal adenopathy, concerning for malignancy. She was also seen by pulmonologist,  . A transthoracic needle biopsy of a left parietal pleural-based mass was performed on 5/27/2021. Pathology documented metastatic poorly differentiated adenocarcinoma.     Damon Rea was discharged to be followed up in outpatient setting to complete the metastatic work-up and for further decision-making. She returned due to increasing symptoms for evaluation. CTA of the chest on 6/4/2021 compared to CT scan from 5/24/2021 documented the following:  · No evidence of pulmonary embolus  · Mediastinal, left hilar, subcarinal lymphadenopathy with progression compared to the previous scan only 2 weeks prior. · Metastatic disease involving the left pleura. Small right pleural effusion with associated atelectasis. · Loculated fluid collections on the left  · Diffuse infiltrate in the left lung consistent with carcinomatosis  · Fatty infiltration of the liver    Shay Barragan was seen in evaluation with her son Ortega Clarke present as well as JERILYN Amezcua. Her clinical and radiographic picture was discussed at length with Dr. Griselda Christians. Her CTA of the chest from 6/4/2021 was personally reviewed by me and disclosed and shown to her son and family at their request.     ASSESSMENT RECOMMENDATION AND PLAN:  Discussion undertaken with the patient and her son regarding potential options including Pleurx catheter placement, port placement and systemic therapy that could include chemotherapy and/or immunotherapy. Shay Barragan and her son Ortega Clarke make it abundantly clear that they do not desire interventions of any sort but as previously discussed with other clinicians are requesting to be sent home with hospice for comfort measures only  She does not desire surgical interventions at this time. As discussed with Dr. Griselda Christians, if symptoms became overwhelming, a Pleurx catheter could be placed and this could be done in the outpatient setting. They did request and desire a consultation with hospice so that they could be involved immediately with management. This is reasonable and appropriate. Consultation is placed.          Ethan Zambrano MD    06/05/21  4:28 PM

## 2021-06-05 NOTE — CONSULTS
[address] [facility] Cardiothoracic Surgery  [address], [place] [address]  Phone: [phone]  Fax: [phone]    Name: Corina Habermann  : 1965    DATE: 2021                                          CARDIOTHORACIC SURGERY CONSULTATION    The patient is a 78-year-old female with a history of cigarette smoking who presented to the hospital last week with pleuritic left-sided chest pain and shortness of breath. She was found to have a large left pleural effusion with consolidation of the left lower lobe she was seen in consultation by  pulmonologist.  A transthoracic needle biopsy was performed that demonstrated evidence of metastatic adenocarcinoma. . She was discharged home to follow-up with oncology she developed increasing shortness of breath and severe pain and was readmitted to the hospital for further evaluation. A chest CT scan performed yesterday demonstrates essentially pleural carcinomatosis of the entire left pleural space there is marked consolidation of the left lower lobe. There are 2 small loculated areas of pleural effusion along the diaphragm and paraspinal area these were also present on the scan last week the majority of the free fluid that was mostly encompassing the left upper lobe remains evacuated. . Because of the radiology interpretation of loculated fluid in the left pleural space I was asked see the patient to see if she would be a candidate for further drainage or chest tube insertion    Past Medical History:   Diagnosis Date    Anxiety     Arthritis     Chronic tension-type headache, intractable 2016    COPD (chronic obstructive pulmonary disease) ([address] 2021    Depression     Headache(784.0)     migraine    Lupus ([address]     Metastatic lung cancer (metastasis from lung to other site) ([address] 2021    Neck pain 2016     Past Surgical History:   Procedure Laterality Date    COLONOSCOPY  10/23/2013    Dr Milana Pryor: internal hemorrhoids; hyperplastic polyps (5 yr recal for screening)    HYSTERECTOMY      NECK SURGERY  07/26/2016    Dr Koo Number FLX DX W/COLLJ SPEC WHEN PFRMD N/A 2/6/2017    COLONOSCOPY DIAGNOSTIC OR SCREENING performed by Kishor Cortez DO at [address] [facility] OR    MS EGD TRANSORAL BIOPSY SINGLE/MULTIPLE N/A 7/26/2018    Dr Mandel-w/dilation over wire-51 Croatian-mild esophageal narrowing-Yas (-)    TUBAL LIGATION      TUMOR REMOVAL      right deltoid area    UPPER GASTROINTESTINAL ENDOSCOPY  11/06/2013    Dr Milana Pryor: normal    UPPER GASTROINTESTINAL ENDOSCOPY  7/26/2018    Dr Mandel-w/dilation over wire-51 Croatian-mild esophageal narrowing-Yas (-)     Current Facility-Administered Medications   Medication Dose Route Frequency Provider Last Rate Last Admin    polyethylene glycol (GLYCOLAX) packet 17 g  17 g Oral Daily PRN Elisha Jensen MD        ALPRAZolam Chen Victoriano) tablet 0.25 mg  0.25 mg Oral TID PRN Servando Seo MD   0.25 mg at 06/05/21 1400    albumin human 25 % IV solution 25 g  25 g Intravenous Once Elisha Jensen MD        0.9 % sodium chloride bolus  500 mL Intravenous Once Elisha Jensen MD        midodrine (PROAMATINE) tablet 10 mg  10 mg Oral TID WC Elisha Jensen MD   10 mg at 06/05/21 1132    vancomycin (VANCOCIN) 1,250 mg in dextrose 5 % 250 mL IVPB  1,250 mg Intravenous Q12H Elisha Jensen MD   Stopped at 06/05/21 0953    rOPINIRole (REQUIP) tablet 0.25 mg  0.25 mg Oral Nightly Elisha Jensen MD   0.25 mg at 06/05/21 0005    topiramate (TOPAMAX) tablet 50 mg  50 mg Oral BID Elisha Jensen MD   50 mg at 06/05/21 0830    citalopram (CELEXA) tablet 40 mg  40 mg Oral Daily Elisha Jensen MD   40 mg at 06/05/21 0829    busPIRone (BUSPAR) tablet 5 mg  5 mg Oral BID Elisha Jensen MD   5 mg at 06/05/21 0830    montelukast (SINGULAIR) tablet 10 mg 10 mg Oral Nightly Conan Baumgarten, MD   10 mg at 06/05/21 0006    gabapentin (NEURONTIN) capsule 300 mg  300 mg Oral TID Conan Baumgarten, MD   300 mg at 06/05/21 1514    docusate sodium (COLACE) capsule 100 mg  100 mg Oral Daily Conan Baumgarten, MD   100 mg at 06/05/21 0830    thiamine mononitrate tablet 100 mg  100 mg Oral Daily Conan Baumgarten, MD   100 mg at 06/05/21 0830    tiZANidine (ZANAFLEX) tablet 2 mg  2 mg Oral TID Conan Baumgarten, MD   2 mg at 06/05/21 1514    clonazePAM (KLONOPIN) tablet 0.5 mg  0.5 mg Oral BID Conan Baumgarten, MD   0.5 mg at 06/05/21 0829    sodium chloride flush 0.9 % injection 5-40 mL  5-40 mL Intravenous 2 times per day Conan Baumgarten, MD   10 mL at 06/04/21 2307    sodium chloride flush 0.9 % injection 5-40 mL  5-40 mL Intravenous PRN Conan Baumgarten, MD        0.9 % sodium chloride infusion  25 mL Intravenous PRN Conan Baumgarten, MD        enoxaparin (LOVENOX) injection 40 mg  40 mg Subcutaneous Daily Conan Baumgarten, MD   40 mg at 06/05/21 0824    polyethylene glycol (GLYCOLAX) packet 17 g  17 g Oral Daily PRN Conan Baumgarten, MD        acetaminophen (TYLENOL) tablet 650 mg  650 mg Oral Q6H PRN Conan Baumgarten, MD        Or    acetaminophen (TYLENOL) suppository 650 mg  650 mg Rectal Q6H PRN Conan Baumgarten, MD        potassium chloride (KLOR-CON M) extended release tablet 40 mEq  40 mEq Oral PRN Conan Baumgarten, MD        Or    potassium bicarb-citric acid (EFFER-K) effervescent tablet 40 mEq  40 mEq Oral PRN Conan Baumgarten, MD        Or    potassium chloride 10 mEq/100 mL IVPB (Peripheral Line)  10 mEq Intravenous PRN Conan Baumgarten, MD        potassium chloride 10 mEq/100 mL IVPB (Peripheral Line)  10 mEq Intravenous PRN Conan Baumgarten, MD        magnesium sulfate 2000 mg in 50 mL IVPB premix  2,000 mg Intravenous PRN Mennie Diver S MD [place]        ondansetron (ZOFRAN) injection 4 mg  4 mg Intravenous Q6H PRN Jack Helms MD        famotidine (PEPCID) injection 20 mg  20 mg Intravenous BID Jack Helms MD   20 mg at 06/05/21 0825    nicotine (NICODERM CQ) 21 MG/24HR 1 patch  1 patch Transdermal Daily Jack Helms MD   1 patch at 06/05/21 0828    ceFEPIme (MAXIPIME) 2,000 mg in sodium chloride (PF) 20 mL IV syringe  2,000 mg Intravenous Q12H Jack Helms MD   2,000 mg at 06/05/21 0823    levoFLOXacin (LEVAQUIN) 500 MG/100ML infusion 500 mg  500 mg Intravenous Q24H Jack Helms MD   Stopped at 06/05/21 0107    methylPREDNISolone sodium (SOLU-MEDROL) injection 40 mg  40 mg Intravenous Q8H Jack Helms MD   40 mg at 06/05/21 1514    morphine injection 2 mg  2 mg Intravenous Q3H PRN Jack Helms MD   2 mg at 06/05/21 1341    ipratropium (ATROVENT) 0.02 % nebulizer solution 0.5 mg  0.5 mg Nebulization Q4H WA Jack Helms MD   0.5 mg at 06/05/21 1417    budesonide (PULMICORT) nebulizer suspension 500 mcg  0.5 mg Nebulization BID Jack Helms MD   500 mcg at 06/05/21 4113    vancomycin (VANCOCIN) intermittent dosing (placeholder)   Other RX Placeholder Jack Helms MD         Allergies   Allergen Reactions    Ventolin [Albuterol] Swelling     Family History   Problem Relation Age of Onset    Colon Cancer Maternal Aunt     Colon Polyps Maternal Aunt     Liver Cancer Neg Hx     Liver Disease Neg Hx     Rectal Cancer Neg Hx     Stomach Cancer Neg Hx      Social History     Socioeconomic History    Marital status: Single     Spouse name: Not on file    Number of children: Not on file    Years of education: Not on file    Highest education level: Not on file   Occupational History    Not on file   Tobacco Use    Smoking status: Current Every Day Smoker     Packs/day: 0.50     Years: 32.00     Pack years: 16.00 Temp 96.3 °F (35.7 °C) (Temporal)   Resp 18   Ht 5' 3\" (1.6 m)   Wt 175 lb (79.4 kg)   SpO2 94%   BMI 31.00 kg/m²   A chronically ill mildly debilitated elderly lady who appears much older than her stated age she appears in moderate discomfort at this time  ENT: Throat is clear., Mucosa clear., Septum intact., No pyorrhea or abscess. ,  Dentition is poor  Neck: Neck is soft., Throat is clear., Neck veins are not distended. , Thyroid is normal size.,  She does have some enlarged cervical lymph nodes  Respiratory: Markedly diminished breath sounds on the left side as compared to the right there are no inspiratory wheezes no fremitus. Cardiovascular: Cardiac sounds are clear., Regular rate and rhythm., No murmur., No carotid bruits. , Peripheral pulses are intact., Extremities exhibit no edema or varicosities. Abdomen: Abdomen is soft., No masses or tenderness. , Liver and spleen not palpable., Normal bowel sounds. Lymphatic: No lymphadenopathy in the cervical, axillary, or femoral areas. Musculoskeletal: No clubbing or cyanosis. , Normal muscle tone., Normal range of motion upper and lower extremities. , No joint inflammation or swelling. Skin: No rashes, lesions, or ulcers., No swelling or edema. Neurologic exam: No lateralizing neurologic findings. , Cranial nerves II-XII are normal., Examination of sensation is normal.  Psychiatric: Patient exhibits normal judgement and is oriented to name, time, and place., No anxiety or depression. 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 Prior to my arrival she was being evaluated by oncologist Dr. Sangeeta Keyes. The patient and her family have decided not to pursue active therapy with chemotherapy and have excepted hospice as the final palliative treatment for her widely metastatic disease.   From a thoracic surgical standpoint she has fairly minimal remaining fluid on the left side as compared to the fluid that was there last week the 2 small loculated areas cannot be reached with a chest tube and she certainly is not a candidate for decortication. I did leave the patient and her family with the thought that if the fluid does come back causing substantial shortness of breath she may be a candidate for Pleurx catheter which can be inserted as an outpatient and serves a good palliative purpose for large pleural effusions. I will see her again as needed.      Electronically signed by Lobo Silva MD on 6/5/2021 at 3:37 PM

## 2021-06-05 NOTE — H&P
HISTORY AND PHYSICAL             Date: 6/4/2021        Patient Name: Tyler Kahn     YOB: 1965      Age:  48 y.o. Chief Complaint     Chief Complaint   Patient presents with    Hypotension     sent by PCP kirsten for hypotension    Shortness of Breath      Being evaluated for admission due to recurrent sx of pneumonia and cough and progression of lung cancer and hemoptysis and she states her pain is not controlled with dilaudid and she wants morphine, which will drop her blood pressure. (she states, I dont care. !). However will judiciously use morphine to control her bp and treat pain     History Obtained From   patient    History of Present Illness   Please see the extensive h /p  And discharge summary just done for the patient. She was discharged 5/28/21 and she has returned with recurrent sx which are completely expected and being readmitted for pneuomonia and cough and shortness of breath. She has underlying dx of metastatic stg lV lung cancer, with malignant cells in pleural fluids.   (told her very clearly and up front, that we have no pulmonary coverage at all at the hospital until June 15th). In the event she needed another thoracentesis or pulmonary evaluation she would need to be [facility] to [facility].  She has been admitted there in the past.    She is very tearful and anxious and upset as she is given the news about readmission. She is hysterical and crying and does not wish to discuss anything until she gets herself together.   She needs to call her son to go over the concerns about readmission with family     Past Medical History     Past Medical History:   Diagnosis Date    Anxiety     Arthritis     Chronic tension-type headache, intractable 1/8/2016    COPD (chronic obstructive pulmonary disease) ([facility] [address] 6/4/2021    Depression     Headache(784.0)     migraine    Lupus ([facility] [address]     Metastatic lung cancer (metastasis from lung to other site) ([facility] [address] 6/4/2021    Neck pain 1/8/2016        Past Surgical History     Past Surgical History:   Procedure Laterality Date    COLONOSCOPY  10/23/2013    Dr Gibson Flanagan: internal hemorrhoids; hyperplastic polyps (5 yr recal for screening)    HYSTERECTOMY      NECK SURGERY  07/26/2016    Dr Luz Maria Sotelo FLX DX W/COLLJ SPEC WHEN PFRMD N/A 2/6/2017    COLONOSCOPY DIAGNOSTIC OR SCREENING performed by Philip Chowdhury DO at [facility]    CA EGD TRANSORAL BIOPSY SINGLE/MULTIPLE N/A 7/26/2018    Dr Mandel-w/dilation over wire-51 French-mild esophageal narrowing-Yas (-)    TUBAL LIGATION      TUMOR REMOVAL      right deltoid area    UPPER GASTROINTESTINAL ENDOSCOPY  11/06/2013    Dr Gibson Flanagan: normal    UPPER GASTROINTESTINAL ENDOSCOPY  7/26/2018    Dr Mandel-gal/dilation over wire-51 French-mild esophageal narrowing-Yas (-)        Medications Prior to Admission     Prior to Admission medications    Medication Sig Start Date End Date Taking? Authorizing Provider   azithromycin (ZITHROMAX) 500 MG tablet Take 500 mg by mouth daily    Historical Provider, MD   OXYGEN Inhale into the lungs    Historical Provider, MD   oxymetazoline (AFRIN) 0.05 % nasal spray 2 sprays by Nasal route 2 times daily    Historical Provider, MD   polyethylene glycol (GLYCOLAX) 17 g packet Take 17 g by mouth daily as needed for Constipation    Historical Provider, MD   HYDROcodone-acetaminophen (NORCO) 7.5-325 MG per tablet TAKE 1 TABLET BY MOUTH THREE TIMES DAILY MUST LAST 30 DAYS 5/15/21   Historical Provider, MD   NARCAN 4 MG/0.1ML LIQD nasal spray ADMINISTER A SINGLE SPRAY IN ONE NOSTRIL UPON SIGNS OF OPIOID OVERDOSE. CALL 911. REPEAT AFTER 3 MINUTES IF NO RESPONSE. 4/8/21   Historical Provider, MD   montelukast (SINGULAIR) 10 MG tablet Take 1 tablet by mouth nightly 5/28/21   Jose Antonio Son MD   gabapentin (NEURONTIN) 300 MG capsule Take 1 capsule by mouth 3 times daily for 30 days.  5/28/21 6/27/21  Jose Antonio Son MD   propranolol (INDERAL) 10 MG tablet Take 1 tablet by mouth 2 times daily 5/28/21   Kerrin Bernheim, MD   docusate sodium (COLACE, DULCOLAX) 100 MG CAPS Take 100 mg by mouth daily 5/29/21 6/28/21  Kerrin Bernheim, MD   vitamin B-1 (THIAMINE) 100 MG tablet Take 1 tablet by mouth daily 5/28/21   Kerrin Bernheim, MD   cefdinir (OMNICEF) 300 MG capsule Take 1 capsule by mouth 2 times daily for 7 days 5/28/21 6/4/21  Kerrin Bernheim, MD   ipratropium (ATROVENT HFA) 17 MCG/ACT inhaler Inhale 1 puff into the lungs 3 times daily  Patient not taking: Reported on 6/4/2021 5/28/21 5/28/22  Kerrin Bernheim, MD   tiZANidine (ZANAFLEX) 2 MG tablet Take 1 tablet by mouth 3 times daily 5/28/21   Kerrin Bernheim, MD   ondansetron (ZOFRAN ODT) 4 MG disintegrating tablet Take 1 tablet by mouth every 8 hours as needed for Nausea or Vomiting  Patient not taking: Reported on 6/4/2021 5/18/21   JERILYN Hoyt   citalopram (CELEXA) 40 MG tablet Take 1 tablet by mouth once daily 3/21/21   JERILYN Hoyt   sucralfate (CARAFATE) 1 GM tablet Take 1 tablet by mouth twice daily 3/21/21   JERILYN Hoyt   busPIRone (BUSPAR) 10 MG tablet Take 1 tablet by mouth twice daily 3/21/21   JERILYN Hoyt   simvastatin (ZOCOR) 10 MG tablet Take 1 tablet by mouth nightly 3/21/21   JERILYN Hoyt   [facility]) 120 MG/ML SOAJ 1 injection every 30 days 2/23/21   JERILYN Hoyt   clonazePAM (KLONOPIN) 0.5 MG tablet Take 0.5 mg by mouth 2 times daily as needed.     Historical Provider, MD   SUMAtriptan (IMITREX) 25 MG tablet Take 1 tablet prn migraine 2/2/21   JERILYN Hoyt   rOPINIRole (REQUIP) 0.25 MG tablet Take 1 tablet by mouth nightly 2/2/21   JERILYN Hoyt   omeprazole (PRILOSEC) 40 MG delayed release capsule TAKE ONE CAPSULE BY MOUTH ONCE DAILY 2/2/21   JERILYN Hoyt   fluticasone (FLONASE) 50 MCG/ACT nasal spray USE 1 SPRAY(S) IN EACH NOSTRIL ONCE DAILY 2/2/21   JERILYN Hoyt   topiramate (TOPAMAX) 50 MG tablet TAKE 1 TABLET BY MOUTH TWICE DAILY 2/2/21 JERILYN Zamarripa   diclofenac sodium 1 % GEL Apply 2 g topically 4 times daily  Patient not taking: Reported on 6/4/2021 9/13/19   JERILYN aZmarripa        Allergies   Ventolin [albuterol]    Social History     Social History     Tobacco History     Smoking Status  Current Every Day Smoker Smoking Frequency  0.5 packs/day for 32 years (16 pk yrs) Smoking Tobacco Type  Cigarettes, E-Cigarettes    Smokeless Tobacco Use  Never Used          Alcohol History     Alcohol Use Status  No          Drug Use     Drug Use Status  No          Sexual Activity     Sexually Active  Not Asked                Family History     Family History   Problem Relation Age of Onset    Colon Cancer Maternal Aunt     Colon Polyps Maternal Aunt     Liver Cancer Neg Hx     Liver Disease Neg Hx     Rectal Cancer Neg Hx     Stomach Cancer Neg Hx        Review of Systems   Review of Systems   Constitutional: Positive for activity change and fatigue. HENT: Negative. Eyes: Negative. Respiratory: Positive for cough, shortness of breath and wheezing. Cardiovascular: Positive for palpitations and leg swelling. Gastrointestinal: Negative. Endocrine: Negative. Genitourinary: Negative. Musculoskeletal: Positive for arthralgias, back pain and myalgias. Left shoulder pain    Allergic/Immunologic: Negative. Neurological: Positive for dizziness and tremors. Hematological: Negative. Psychiatric/Behavioral: Positive for agitation and dysphoric mood. The patient is nervous/anxious. All other systems reviewed and are negative. Physical Exam   /76   Pulse 108   Temp 98.8 °F (37.1 °C) (Temporal)   Resp 24   Wt 175 lb (79.4 kg)   SpO2 96%   BMI 31.00 kg/m²     Physical Exam  Vitals and nursing note reviewed. Constitutional:       General: She is in acute distress. HENT:      Head: Normocephalic and atraumatic.       Nose: Nose normal.      Mouth/Throat:      Mouth: Mucous membranes are moist.   Eyes: Conjunctiva/sclera: Conjunctivae normal.   Cardiovascular:      Rate and Rhythm: Regular rhythm. Tachycardia present. Pulses: Normal pulses. Pulmonary:      Breath sounds: Wheezing and rales present. Comments: Right side and left side of lung with diminished coarse breath sounds with wheezes and rhonchi noted. Abdominal:      General: Abdomen is flat. Bowel sounds are normal.   Musculoskeletal:      Cervical back: Normal range of motion. Right lower leg: Edema present. Left lower leg: Edema present. Skin:     General: Skin is warm.          Labs      Recent Results (from the past 24 hour(s))   Blood Gas, Arterial    Collection Time: 06/04/21  3:28 PM   Result Value Ref Range    pH, Arterial 7.410 7.350 - 7.450    pCO2, Arterial 38.0 35.0 - 45.0 mmHg    pO2, Arterial 69.0 (L) 80.0 - 100.0 mmHg    HCO3, Arterial 24.1 22.0 - 26.0 mmol/L    Base Excess, Arterial -0.4 -2.0 - 2.0 mmol/L    Hemoglobin, Art, Extended 9.9 (L) 12.0 - 16.0 g/dL    O2 Sat, Arterial 93.8 >92 %    Carboxyhgb, Arterial 1.6 0.0 - 5.0 %    Methemoglobin, Arterial 1.0 <1.5 %    O2 Content, Arterial 13.1 Not Established mL/dL    O2 Therapy Unknown    Potassium, Whole Blood    Collection Time: 06/04/21  3:28 PM   Result Value Ref Range    Potassium, Whole Blood 3.7    CBC Auto Differential    Collection Time: 06/04/21  4:16 PM   Result Value Ref Range    WBC 10.9 (H) 4.8 - 10.8 K/uL    RBC 3.43 (L) 4.20 - 5.40 M/uL    Hemoglobin 10.4 (L) 12.0 - 16.0 g/dL    Hematocrit 33.5 (L) 37.0 - 47.0 %    MCV 97.7 81.0 - 99.0 fL    MCH 30.3 27.0 - 31.0 pg    MCHC 31.0 (L) 33.0 - 37.0 g/dL    RDW 13.0 11.5 - 14.5 %    Platelets 520 (H) 307 - 400 K/uL    MPV 9.0 (L) 9.4 - 12.3 fL    Neutrophils % 77.8 (H) 50.0 - 65.0 %    Lymphocytes % 11.9 (L) 20.0 - 40.0 %    Monocytes % 8.9 0.0 - 10.0 %    Eosinophils % 0.4 0.0 - 5.0 %    Basophils % 0.5 0.0 - 1.0 %    Neutrophils Absolute 8.5 (H) 1.5 - 7.5 K/uL    Immature Granulocytes # 0.1 K/uL Lymphocytes Absolute 1.3 1.1 - 4.5 K/uL    Monocytes Absolute 1.00 (H) 0.00 - 0.90 K/uL    Eosinophils Absolute 0.00 0.00 - 0.60 K/uL    Basophils Absolute 0.10 0.00 - 0.20 K/uL   Comprehensive Metabolic Panel w/ Reflex to MG    Collection Time: 06/04/21  4:16 PM   Result Value Ref Range    Sodium 135 (L) 136 - 145 mmol/L    Potassium reflex Magnesium 4.6 3.5 - 5.0 mmol/L    Chloride 99 98 - 111 mmol/L    CO2 26 22 - 29 mmol/L    Anion Gap 10 7 - 19 mmol/L    Glucose 86 74 - 109 mg/dL    BUN 5 (L) 6 - 20 mg/dL    CREATININE 0.5 0.5 - 0.9 mg/dL    GFR Non-African American >60 >60    GFR African American >59 >59    Calcium 8.2 (L) 8.6 - 10.0 mg/dL    Total Protein 6.2 (L) 6.6 - 8.7 g/dL    Albumin 2.5 (L) 3.5 - 5.2 g/dL    Total Bilirubin 0.3 0.2 - 1.2 mg/dL    Alkaline Phosphatase 133 (H) 35 - 104 U/L    ALT 8 5 - 33 U/L    AST 24 5 - 32 U/L   Troponin    Collection Time: 06/04/21  4:16 PM   Result Value Ref Range    Troponin <0.01 0.00 - 0.03 ng/mL   Lipase    Collection Time: 06/04/21  4:16 PM   Result Value Ref Range    Lipase 14 13 - 60 U/L   Urinalysis Reflex to Culture    Collection Time: 06/04/21  4:16 PM    Specimen: Urine, clean catch   Result Value Ref Range    Color, UA YELLOW Straw/Yellow    Clarity, UA Clear Clear    Glucose, Ur Negative Negative mg/dL    Bilirubin Urine Negative Negative    Ketones, Urine Negative Negative mg/dL    Specific Gravity, UA 1.013 1.005 - 1.030    Blood, Urine Negative Negative    pH, UA 8.0 5.0 - 8.0    Protein, UA Negative Negative mg/dL    Urobilinogen, Urine 2.0 (A) <2.0 E.U./dL    Nitrite, Urine Negative Negative    Leukocyte Esterase, Urine TRACE (A) Negative   Lactic Acid, Plasma    Collection Time: 06/04/21  4:16 PM   Result Value Ref Range    Lactic Acid 1.8 0.5 - 1.9 mmol/L   PROCALCITONIN    Collection Time: 06/04/21  4:16 PM   Result Value Ref Range    Procalcitonin 0.18 (H) 0.00 - 0.09 ng/mL   Microscopic Urinalysis    Collection Time: 06/04/21  4:16 PM   Result left hilar lymphadenopathy. There is subcarinal lymphadenopathy. LUNGS: The lungs are clear with no focal infiltrate or effusion. UPPER ABDOMEN: There is fatty infiltration of the visualized liver. BONES/SOFT TISSUES/: There are degenerative changes of the spine. There is loculated pleural effusion on the left side. There is nodularity along the pleural surfaces consistent with metastatic pleural disease. There is diffuse infiltrate throughout the lingula, left lower lobe and to a lesser extent in the left upper lobe. There is mild dependent atelectasis in the right lung. There is a small right pleural effusion. 1. No CT evidence of pulmonary embolus. 2. Mediastinal and left hilar lymphadenopathy. Subcarinal lymphadenopathy. The adenopathy may have progressed some compared to the prior study. 3. Metastatic disease involving the pleura on the left side. There are loculated fluid collections on the left side that are likely related to the malignant pleural disease. Infection/empyema is not fully ruled out. 4. Diffuse infiltrate in the left lung may be infectious or inflammatory. Post radiation pneumonitis is possible. Interstitial spread of tumor is also in the differential. 5. Minimal right pleural effusion. Mild dependent atelectasis on the right. 6. Fatty infiltration of the liver.  Signed by Dr Ana M Rowley on 6/4/2021 6:47 PM      Assessment      Hospital Problems         Last Modified POA    * (Principal) Bacteremia due to Streptococcus pneumoniae 6/4/2021 Yes    Depression 6/4/2021 Yes    Cervical radiculopathy 6/4/2021 Yes    Hypoesthesia of skin 6/4/2021 Yes    Cigarette nicotine dependence with nicotine-induced disorder 6/4/2021 Yes    Palliative care patient 6/4/2021 Yes    Metastatic lung cancer (metastasis from lung to other site) ([address] 6/4/2021 Yes    Pain syndrome, chronic 6/4/2021 Yes    Adenocarcinoma of left lung, stage 4 ([address] 6/4/2021 Yes    Anorexia 6/4/2021 Yes    Tobacco abuse 6/4/2021 Yes COPD (chronic obstructive pulmonary disease) ([facility] [address] 6/4/2021 Yes    Hypoxemia 6/4/2021 Yes    Dyspnea 6/4/2021 Yes          Plan   1. Patient is being admitted for recurrent sx of shortness of breath and cough and pneumonia and hypoxemia worsened from baseline and she was just discharged home on 5/28/21 and she did well at home for a  Day or so and then began feeling terrible again. Today, was having a more difficult time with cough and shortness of breath and pain in chest wall area, with radiation to shoulder. Recent dx noted to be poorly differentiated adenocarcinoma most likely of lung primary   And with diffuse metastatic disease involving pleural fluid. Admit for recurrent pneumonia , hospital acquired pneumonia and treat with abx vanc, cefepime, and levaquin, bronchodilators, and steroids. And supplemental oxygen and she feels that morphine helps her more with pain control than dilaudid     She is mildly hypotensive and told her I would need to get her bp up prior to giving her pain meds. Midodrine   Fluids   Albumin   Steroids   And then institute morphine    2. Code status   dnr cc   And do not intubate  Consult palliative care if she agrees. 3.  Copd and tobacco abuse, and hypoxemia with recent dx of lung cancer, adenocarcinoma of lung with metastatic disease. 4.  Anorexia     5. Depression and going the stages of grief and acceptance of illness     6.  dvt and gi prophylaxis.      7.  She did require a thoracentesis and removal of pleural fluid last admit  She may require this again, however, we do not have pulmonary support here at hospital until [address]         Consultations Ordered:  [address]    Electronically signed by Myke Ocasio MD on 6/4/21 at 7:57 PM CDT

## 2021-06-06 VITALS
OXYGEN SATURATION: 90 % | HEIGHT: 63 IN | TEMPERATURE: 96.7 F | HEART RATE: 85 BPM | DIASTOLIC BLOOD PRESSURE: 68 MMHG | SYSTOLIC BLOOD PRESSURE: 106 MMHG | BODY MASS INDEX: 30.3 KG/M2 | WEIGHT: 171 LBS | RESPIRATION RATE: 18 BRPM

## 2021-06-06 LAB
ALBUMIN SERPL-MCNC: 2.8 G/DL (ref 3.5–5.2)
ALP BLD-CCNC: 120 U/L (ref 35–104)
ALT SERPL-CCNC: 8 U/L (ref 5–33)
ANION GAP SERPL CALCULATED.3IONS-SCNC: 10 MMOL/L (ref 7–19)
AST SERPL-CCNC: 16 U/L (ref 5–32)
BASOPHILS ABSOLUTE: 0 K/UL (ref 0–0.2)
BASOPHILS RELATIVE PERCENT: 0.2 % (ref 0–1)
BILIRUB SERPL-MCNC: <0.2 MG/DL (ref 0.2–1.2)
BUN BLDV-MCNC: 8 MG/DL (ref 6–20)
CALCIUM SERPL-MCNC: 8.7 MG/DL (ref 8.6–10)
CHLORIDE BLD-SCNC: 103 MMOL/L (ref 98–111)
CO2: 26 MMOL/L (ref 22–29)
CREAT SERPL-MCNC: 0.4 MG/DL (ref 0.5–0.9)
EOSINOPHILS ABSOLUTE: 0 K/UL (ref 0–0.6)
EOSINOPHILS RELATIVE PERCENT: 0 % (ref 0–5)
GFR AFRICAN AMERICAN: >59
GFR NON-AFRICAN AMERICAN: >60
GLUCOSE BLD-MCNC: 115 MG/DL (ref 74–109)
HCT VFR BLD CALC: 31.4 % (ref 37–47)
HEMOGLOBIN: 9.6 G/DL (ref 12–16)
IMMATURE GRANULOCYTES #: 0.1 K/UL
LYMPHOCYTES ABSOLUTE: 0.7 K/UL (ref 1.1–4.5)
LYMPHOCYTES RELATIVE PERCENT: 5.3 % (ref 20–40)
MAGNESIUM: 2.2 MG/DL (ref 1.6–2.6)
MCH RBC QN AUTO: 29.7 PG (ref 27–31)
MCHC RBC AUTO-ENTMCNC: 30.6 G/DL (ref 33–37)
MCV RBC AUTO: 97.2 FL (ref 81–99)
MONOCYTES ABSOLUTE: 0.6 K/UL (ref 0–0.9)
MONOCYTES RELATIVE PERCENT: 4.3 % (ref 0–10)
NEUTROPHILS ABSOLUTE: 11.5 K/UL (ref 1.5–7.5)
NEUTROPHILS RELATIVE PERCENT: 89.7 % (ref 50–65)
PDW BLD-RTO: 13 % (ref 11.5–14.5)
PLATELET # BLD: 603 K/UL (ref 130–400)
PMV BLD AUTO: 8.7 FL (ref 9.4–12.3)
POTASSIUM SERPL-SCNC: 4.5 MMOL/L (ref 3.5–5)
RBC # BLD: 3.23 M/UL (ref 4.2–5.4)
SODIUM BLD-SCNC: 139 MMOL/L (ref 136–145)
TOTAL PROTEIN: 6.1 G/DL (ref 6.6–8.7)
VANCOMYCIN TROUGH: 18.2 UG/ML (ref 10–20)
WBC # BLD: 12.8 K/UL (ref 4.8–10.8)

## 2021-06-06 PROCEDURE — 83735 ASSAY OF MAGNESIUM: CPT

## 2021-06-06 PROCEDURE — 2580000003 HC RX 258: Performed by: INTERNAL MEDICINE

## 2021-06-06 PROCEDURE — 36415 COLL VENOUS BLD VENIPUNCTURE: CPT

## 2021-06-06 PROCEDURE — 6360000002 HC RX W HCPCS: Performed by: INTERNAL MEDICINE

## 2021-06-06 PROCEDURE — 80053 COMPREHEN METABOLIC PANEL: CPT

## 2021-06-06 PROCEDURE — 2500000003 HC RX 250 WO HCPCS: Performed by: INTERNAL MEDICINE

## 2021-06-06 PROCEDURE — 2700000000 HC OXYGEN THERAPY PER DAY

## 2021-06-06 PROCEDURE — 99232 SBSQ HOSP IP/OBS MODERATE 35: CPT | Performed by: INTERNAL MEDICINE

## 2021-06-06 PROCEDURE — 6370000000 HC RX 637 (ALT 250 FOR IP): Performed by: INTERNAL MEDICINE

## 2021-06-06 PROCEDURE — 94640 AIRWAY INHALATION TREATMENT: CPT

## 2021-06-06 PROCEDURE — 80202 ASSAY OF VANCOMYCIN: CPT

## 2021-06-06 PROCEDURE — 85025 COMPLETE CBC W/AUTO DIFF WBC: CPT

## 2021-06-06 RX ORDER — AMITRIPTYLINE HYDROCHLORIDE 100 MG/1
100 TABLET, FILM COATED ORAL NIGHTLY
COMMUNITY

## 2021-06-06 RX ORDER — OXYCODONE HYDROCHLORIDE 5 MG/1
5 TABLET ORAL EVERY 6 HOURS PRN
Qty: 12 TABLET | Refills: 0 | Status: SHIPPED | OUTPATIENT
Start: 2021-06-06 | End: 2021-06-09

## 2021-06-06 RX ORDER — ZOLPIDEM TARTRATE 5 MG/1
5 TABLET ORAL NIGHTLY PRN
Status: DISCONTINUED | OUTPATIENT
Start: 2021-06-06 | End: 2021-06-06 | Stop reason: HOSPADM

## 2021-06-06 RX ORDER — MECOBALAMIN 5000 MCG
5 TABLET,DISINTEGRATING ORAL NIGHTLY
Qty: 30 TABLET | Refills: 0 | Status: SHIPPED | OUTPATIENT
Start: 2021-06-06

## 2021-06-06 RX ORDER — MECOBALAMIN 5000 MCG
5 TABLET,DISINTEGRATING ORAL NIGHTLY
Status: DISCONTINUED | OUTPATIENT
Start: 2021-06-06 | End: 2021-06-06 | Stop reason: HOSPADM

## 2021-06-06 RX ORDER — ZOLPIDEM TARTRATE 5 MG/1
5 TABLET ORAL NIGHTLY PRN
Qty: 14 TABLET | Refills: 0 | Status: SHIPPED | OUTPATIENT
Start: 2021-06-06 | End: 2021-06-20

## 2021-06-06 RX ORDER — MORPHINE SULFATE 4 MG/ML
4 INJECTION, SOLUTION INTRAMUSCULAR; INTRAVENOUS
Status: DISCONTINUED | OUTPATIENT
Start: 2021-06-06 | End: 2021-06-06 | Stop reason: HOSPADM

## 2021-06-06 RX ORDER — MORPHINE SULFATE 4 MG/ML
2 INJECTION, SOLUTION INTRAMUSCULAR; INTRAVENOUS
Status: DISCONTINUED | OUTPATIENT
Start: 2021-06-06 | End: 2021-06-06 | Stop reason: HOSPADM

## 2021-06-06 RX ORDER — ALPRAZOLAM 0.25 MG/1
0.25 TABLET ORAL 3 TIMES DAILY PRN
Qty: 30 TABLET | Refills: 0 | Status: SHIPPED | OUTPATIENT
Start: 2021-06-06 | End: 2021-07-06

## 2021-06-06 RX ADMIN — IPRATROPIUM BROMIDE 0.5 MG: 0.5 SOLUTION RESPIRATORY (INHALATION) at 06:32

## 2021-06-06 RX ADMIN — Medication 2 MG: at 08:44

## 2021-06-06 RX ADMIN — MORPHINE SULFATE 4 MG: 4 INJECTION, SOLUTION INTRAMUSCULAR; INTRAVENOUS at 12:02

## 2021-06-06 RX ADMIN — MIDODRINE HYDROCHLORIDE 10 MG: 10 TABLET ORAL at 12:02

## 2021-06-06 RX ADMIN — BUSPIRONE HYDROCHLORIDE 5 MG: 5 TABLET ORAL at 08:52

## 2021-06-06 RX ADMIN — MORPHINE SULFATE 4 MG: 4 INJECTION, SOLUTION INTRAMUSCULAR; INTRAVENOUS at 15:31

## 2021-06-06 RX ADMIN — Medication 2 MG: at 03:35

## 2021-06-06 RX ADMIN — GABAPENTIN 300 MG: 300 CAPSULE ORAL at 08:53

## 2021-06-06 RX ADMIN — VANCOMYCIN HYDROCHLORIDE 1250 MG: 10 INJECTION, POWDER, LYOPHILIZED, FOR SOLUTION INTRAVENOUS at 08:51

## 2021-06-06 RX ADMIN — SODIUM CHLORIDE, PRESERVATIVE FREE 2000 MG: 5 INJECTION INTRAVENOUS at 08:46

## 2021-06-06 RX ADMIN — BUDESONIDE 500 MCG: 0.5 SUSPENSION RESPIRATORY (INHALATION) at 06:34

## 2021-06-06 RX ADMIN — FAMOTIDINE 20 MG: 10 INJECTION, SOLUTION INTRAVENOUS at 09:00

## 2021-06-06 RX ADMIN — CLONAZEPAM 0.5 MG: 0.5 TABLET ORAL at 08:53

## 2021-06-06 RX ADMIN — TIZANIDINE 2 MG: 4 TABLET ORAL at 08:53

## 2021-06-06 RX ADMIN — ALPRAZOLAM 0.25 MG: 0.25 TABLET ORAL at 15:31

## 2021-06-06 RX ADMIN — TOPIRAMATE 50 MG: 50 TABLET, FILM COATED ORAL at 08:53

## 2021-06-06 RX ADMIN — ENOXAPARIN SODIUM 40 MG: 40 INJECTION SUBCUTANEOUS at 08:49

## 2021-06-06 RX ADMIN — Medication 2 MG: at 06:45

## 2021-06-06 RX ADMIN — ALPRAZOLAM 0.25 MG: 0.25 TABLET ORAL at 08:44

## 2021-06-06 RX ADMIN — IPRATROPIUM BROMIDE 0.5 MG: 0.5 SOLUTION RESPIRATORY (INHALATION) at 10:20

## 2021-06-06 RX ADMIN — DOCUSATE SODIUM 100 MG: 100 CAPSULE, LIQUID FILLED ORAL at 08:52

## 2021-06-06 RX ADMIN — THIAMINE HCL TAB 100 MG 100 MG: 100 TAB at 08:53

## 2021-06-06 RX ADMIN — TIZANIDINE 2 MG: 4 TABLET ORAL at 14:21

## 2021-06-06 RX ADMIN — METHYLPREDNISOLONE SODIUM SUCCINATE 40 MG: 40 INJECTION, POWDER, FOR SOLUTION INTRAMUSCULAR; INTRAVENOUS at 09:00

## 2021-06-06 RX ADMIN — GABAPENTIN 300 MG: 300 CAPSULE ORAL at 14:21

## 2021-06-06 RX ADMIN — CITALOPRAM HYDROBROMIDE 40 MG: 20 TABLET ORAL at 08:53

## 2021-06-06 RX ADMIN — MIDODRINE HYDROCHLORIDE 10 MG: 10 TABLET ORAL at 08:53

## 2021-06-06 RX ADMIN — IPRATROPIUM BROMIDE 0.5 MG: 0.5 SOLUTION RESPIRATORY (INHALATION) at 14:18

## 2021-06-06 ASSESSMENT — PAIN SCALES - GENERAL
PAINLEVEL_OUTOF10: 8
PAINLEVEL_OUTOF10: 10
PAINLEVEL_OUTOF10: 0
PAINLEVEL_OUTOF10: 10
PAINLEVEL_OUTOF10: 9
PAINLEVEL_OUTOF10: 3
PAINLEVEL_OUTOF10: 7

## 2021-06-06 ASSESSMENT — ENCOUNTER SYMPTOMS
EYE REDNESS: 0
EYE DISCHARGE: 0
BACK PAIN: 0
EYE PAIN: 0
SORE THROAT: 0
BLOOD IN STOOL: 0
DIARRHEA: 0
CONSTIPATION: 0
EYES NEGATIVE: 1
WHEEZING: 0
COUGH: 1
VOMITING: 0
GASTROINTESTINAL NEGATIVE: 1
SHORTNESS OF BREATH: 1
ABDOMINAL PAIN: 0
NAUSEA: 0

## 2021-06-06 NOTE — PROGRESS NOTES
[facility]        Hospitalist Progress Note  6/6/2021 11:42 AM  Subjective:   Admit Date: 6/4/2021  PCP: JERILYN Meredith    Chief Complaint: Worsening dyspnea; pain; hypotension at PCP office    Subjective: Patient seen and examined at bedside. Still in pain. Didn't sleep well last night. Cumulative Hospital History: 71-year-old obese female with a past medical history of lupus, anxiety, migraines recently admitted (discharged 5/28/21) for dyspnea found to have large left-sided pleural effusion with pleural nodularity concerning for a malignancy with chest tube placed with drainage of left pleural fluid. Patient also status post CT-guided biopsy of left pleural masslike thickening performed 5/27/2021. Oncology on board and pathology now showing metastatic poorly differentiated adenocarcinoma. Patient with lytic lesions on CT head with some intermittent confusion; EEG showing findings of encephalopathy and MRI brain showing enhancing skull lesion suspicious for osseous metastasis. Patient wishes to be DNR/DNI after goals of care discussion. She was sent home with 4L of home oxygen at that time. Patient presented to her PCPs office for follow-up and she was noted to be hypotensive, more dyspneic and in pain; she was subsequently sent to the emergency department for further evaluation. She was found to have a loculated left sided pleural effusion with associated worsening dyspnea and was admitted for further management. CT surgery, oncology have been consulted. ROS: 14 point review of systems is negative except as specifically addressed above.     ADULT DIET; Easy to Chew    Intake/Output Summary (Last 24 hours) at 6/6/2021 1142  Last data filed at 6/6/2021 0934  Gross per 24 hour   Intake 1549 ml   Output 900 ml   Net 649 ml     Medications:   sodium chloride       Current Facility-Administered Medications   Medication Dose Route Frequency Provider Last Rate Last Admin    vancomycin (VANCOCIN) 1000 mg in dextrose 5% 250 mL IVPB  1,000 mg Intravenous Q12H Samra Cooper MD        melatonin disintegrating tablet 5 mg  5 mg Oral Nightly Anjelica Dunbar MD        zolpidem [facility]) tablet 5 mg  5 mg Oral Nightly PRN Anjelica Dunbar MD        morphine injection 2 mg  2 mg Intravenous Q2H PRN Anjelica Dunbar MD        Or    morphine injection 4 mg  4 mg Intravenous Q2H PRN Anjelica Dunbar MD        polyethylene glycol [facility]) packet 17 g  17 g Oral Daily PRN Samra Cooper MD        ALPRAZolam [facility]) tablet 0.25 mg  0.25 mg Oral TID PRN Anjelica Dunbar MD   0.25 mg at 06/06/21 0844    albumin human 25 % IV solution 25 g  25 g Intravenous Once Samra Cooper MD        0.9 % sodium chloride bolus  500 mL Intravenous Once Samra Cooper MD        midodrine (PROAMATINE) tablet 10 mg  10 mg Oral TID WC Samra Cooper MD   10 mg at 06/06/21 0853    rOPINIRole (REQUIP) tablet 0.25 mg  0.25 mg Oral Nightly Samra Cooper MD   0.25 mg at 06/05/21 2036    topiramate (TOPAMAX) tablet 50 mg  50 mg Oral BID Samra Cooper MD   50 mg at 06/06/21 0853    citalopram (CELEXA) tablet 40 mg  40 mg Oral Daily Samra Cooper MD   40 mg at 06/06/21 0853    busPIRone (BUSPAR) tablet 5 mg  5 mg Oral BID Samra Cooper MD   5 mg at 06/06/21 0852    montelukast (SINGULAIR) tablet 10 mg  10 mg Oral Nightly Samra Cooper MD   10 mg at 06/05/21 2036    gabapentin (NEURONTIN) capsule 300 mg  300 mg Oral TID Samra Cooper MD   300 mg at 06/06/21 0853    docusate sodium (COLACE) capsule 100 mg  100 mg Oral Daily Samra Cooper MD   100 mg at 06/06/21 1200    thiamine mononitrate tablet 100 mg  100 mg Oral Daily Samra Cooper MD   100 mg at 06/06/21 0853    tiZANidine (ZANAFLEX) tablet 2 mg  2 mg Oral TID Samra Cooper MD   2 mg at 06/06/21 0853    clonazePAM (KLONOPIN) tablet 0.5 mg  0.5 mg Oral BID Nabila Nichols MD   0.5 mg at 06/06/21 0853    sodium chloride flush 0.9 % injection 5-40 mL  5-40 mL Intravenous 2 times per day Nabila Nichols MD   10 mL at 06/04/21 2307    sodium chloride flush 0.9 % injection 5-40 mL  5-40 mL Intravenous PRN Nabila Nichols MD        0.9 % sodium chloride infusion  25 mL Intravenous PRN Nabila Nichols MD        enoxaparin (LOVENOX) injection 40 mg  40 mg Subcutaneous Daily Nabila Nichols MD   40 mg at 06/06/21 0849    polyethylene glycol (GLYCOLAX) packet 17 g  17 g Oral Daily PRN Nabila Nichols MD        acetaminophen (TYLENOL) tablet 650 mg  650 mg Oral Q6H PRN Nabila Nichols MD        Or    acetaminophen (TYLENOL) suppository 650 mg  650 mg Rectal Q6H PRN Nabila Nichols MD        potassium chloride (KLOR-CON M) extended release tablet 40 mEq  40 mEq Oral PRN Nabila Nichols MD        Or    potassium bicarb-citric acid (EFFER-K) effervescent tablet 40 mEq  40 mEq Oral PRN Nabila Nichols MD        Or    potassium chloride 10 mEq/100 mL IVPB (Peripheral Line)  10 mEq Intravenous PRN Nabila Nichols MD        potassium chloride 10 mEq/100 mL IVPB (Peripheral Line)  10 mEq Intravenous PRN Nabila Nichols MD        magnesium sulfate 2000 mg in 50 mL IVPB premix  2,000 mg Intravenous PRN Nabila Nichols MD        ondansetron (ZOFRAN) injection 4 mg  4 mg Intravenous Q6H PRN Nabila Nichols MD        famotidine (PEPCID) injection 20 mg  20 mg Intravenous BID Nabila Nichols MD   20 mg at 06/05/21 1942    nicotine (NICODERM CQ) 21 MG/24HR 1 patch  1 patch Transdermal Daily Nabila Nichols MD   1 patch at 06/06/21 0850    ceFEPIme (MAXIPIME) 2,000 mg in sodium chloride (PF) 20 mL IV syringe  2,000 mg Intravenous Q12H Nabila Nichols MD   2,000 mg at 06/06/21 0846    levoFLOXacin (LEVAQUIN) 500 MG/100ML infusion 500 mg 500 mg Intravenous Q24H Zoraida Forbes MD   Stopped at 06/06/21 0102    methylPREDNISolone sodium (SOLU-MEDROL) injection 40 mg  40 mg Intravenous Q8H Zoraida Forbes MD   40 mg at 06/05/21 2258    ipratropium (ATROVENT) 0.02 % nebulizer solution 0.5 mg  0.5 mg Nebulization Q4H WA Zoraida Forbes MD   0.5 mg at 06/06/21 1020    budesonide (PULMICORT) nebulizer suspension 500 mcg  0.5 mg Nebulization BID Zoraida Forbes MD   500 mcg at 06/06/21 5372    vancomycin (VANCOCIN) intermittent dosing (placeholder)   Other RX Placeholder Zoraida Forbes MD            Labs:     Recent Labs     06/04/21  1616 06/05/21  0304 06/06/21  0305   WBC 10.9* 14.8* 12.8*   RBC 3.43* 3.35* 3.23*   HGB 10.4* 10.1* 9.6*   HCT 33.5* 32.9* 31.4*   MCV 97.7 98.2 97.2   MCH 30.3 30.1 29.7   MCHC 31.0* 30.7* 30.6*   * 527* 603*     Recent Labs     06/04/21  1616 06/05/21  0304 06/06/21  0305   * 136 139   K 4.6 4.1 4.5   ANIONGAP 10 13 10   CL 99 100 103   CO2 26 23 26   BUN 5* 5* 8   CREATININE 0.5 0.5 0.4*   GLUCOSE 86 99 115*   CALCIUM 8.2* 8.3* 8.7     Recent Labs     06/06/21  0305   MG 2.2     Recent Labs     06/04/21  1616 06/05/21  0304 06/06/21  0305   AST 24 15 16   ALT 8 7 8   BILITOT 0.3 <0.2 <0.2   ALKPHOS 133* 134* 120*     ABGs:No results for input(s): PH, PO2, PCO2, HCO3, BE, O2SAT in the last 72 hours.   Troponin T:   Recent Labs     06/04/21  1616   TROPONINI <0.01     INR:   Recent Labs     06/05/21  0304   INR 1.20*     Lactic Acid:   Recent Labs     06/05/21  0304   LACTA 1.2       Objective:   Vitals: /68   Pulse 85   Temp 96.7 °F (35.9 °C) (Temporal)   Resp 18   Ht 5' 3\" (1.6 m)   Wt 171 lb (77.6 kg)   SpO2 90%   BMI 30.29 kg/m²   24HR INTAKE/OUTPUT:      Intake/Output Summary (Last 24 hours) at 6/6/2021 1142  Last data filed at 6/6/2021 0934  Gross per 24 hour   Intake 1549 ml   Output 900 ml   Net 649 ml     General appearance: alert and cooperative with exam, appears uncomfortable, appears older than stated age  [de-identified]: AT/NC  Lungs: BLAE, +crackles, diminished air entry left base. Mild wheezing appreciated  Heart: RRR, no murmurs appreciated  Abdomen: BS+, soft, NT, ND  Extremities: no edema, pulses+  Neurologic: Alert, gross motor function intact  Skin: warm    Assessment and Plan:   Principal Problem:    Loculated pleural effusion  Active Problems:    Malignant pleural effusion    Depression    Cervical radiculopathy    Hypoesthesia of skin    Cigarette nicotine dependence with nicotine-induced disorder    Palliative care patient    Metastatic lung cancer (metastasis from lung to other site) (HCC)    Pain syndrome, chronic    Adenocarcinoma of left lung, stage 4 (HCC)    Anorexia    Tobacco abuse    COPD (chronic obstructive pulmonary disease) (HCC)    Hypoxemia    Dyspnea  Resolved Problems:    * No resolved hospital problems. *    Loculated pleural effusion/metastatic adenocarcinoma/COPD: Antibiotics. Bronchodilators. As needed pain management. Hospice consulted. Depression/Anxiety: Home medications continued. Hospital bed ordered for home. Pain medications increased. Ambien and melatonin available for sleep. Hospice placement/home with hospice once evaluation has been performed.      Advance Directive: DNR-CC    DVT prophylaxis: Lovenox    Discharge planning: TBD      Signed:  Giselle Campos MD 6/6/2021 11:42 AM  Rounding Hospitalist

## 2021-06-06 NOTE — PROGRESS NOTES
radiographic picture was discussed at length with Dr. Rachna Harrington. Her CTA of the chest from 6/4/2021 was personally reviewed by me and disclosed and shown to her son and family at their request.           Review of Systems   Constitutional: Positive for activity change (Declined) and fatigue. Negative for chills, diaphoresis and fever. HENT: Negative. Negative for congestion, ear pain, hearing loss, nosebleeds, sore throat and tinnitus. Eyes: Negative. Negative for pain, discharge and redness. Respiratory: Positive for cough and shortness of breath. Negative for wheezing. Cardiovascular: Negative. Negative for chest pain, palpitations and leg swelling. Gastrointestinal: Negative. Negative for abdominal pain, blood in stool, constipation, diarrhea, nausea and vomiting. Endocrine: Negative for polydipsia. Genitourinary: Negative for dysuria, flank pain, frequency, hematuria and urgency. Musculoskeletal: Negative. Negative for back pain, myalgias and neck pain. Skin: Negative. Negative for rash. Neurological: Positive for weakness. Negative for dizziness, tremors, seizures and headaches. Hematological: Does not bruise/bleed easily. Psychiatric/Behavioral: Negative. The patient is not nervous/anxious.         Current Pain Assessment  Pain Assessment  Pain Assessment: FLACC  Pain Level: 10  Patient's Stated Pain Goal: No pain  Pain Type: Acute pain  Pain Location: Rib cage  Pain Orientation: Left  Pain Descriptors: Aching  Pain Frequency: Continuous  Pain Onset: On-going  Clinical Progression: Not changed  Functional Pain Assessment: Prevents or interferes some active activities and ADLs  Non-Pharmaceutical Pain Intervention(s): Emotional support  Response to Pain Intervention: Asleep with RR greater than 10    OBJECTIVE:  VITALS: /68   Pulse 85   Temp 96.7 °F (35.9 °C) (Temporal)   Resp 18   Ht 5' 3\" (1.6 m)   Wt 171 lb (77.6 kg)   SpO2 90%   BMI 30.29 kg/m²   I&O: Intake/Output Summary (Last 24 hours) at 6/6/2021 0946  Last data filed at 6/6/2021 0934  Gross per 24 hour   Intake 1549 ml   Output 900 ml   Net 649 ml         Physical Exam  Vitals reviewed. Constitutional:       General: She is not in acute distress. Appearance: She is well-developed. She is ill-appearing. She is not diaphoretic. HENT:      Head: Normocephalic and atraumatic. Mouth/Throat:      Pharynx: Uvula midline. Tonsils: No tonsillar exudate. Eyes:      General: Lids are normal. No scleral icterus. Right eye: No discharge. Left eye: No discharge. Conjunctiva/sclera: Conjunctivae normal.      Pupils: Pupils are equal, round, and reactive to light. Neck:      Thyroid: No thyroid mass or thyromegaly. Vascular: No JVD. Trachea: Trachea normal. No tracheal deviation. Cardiovascular:      Rate and Rhythm: Normal rate and regular rhythm. Heart sounds: Normal heart sounds. No murmur heard. No friction rub. No gallop. Pulmonary:      Effort: Pulmonary effort is normal. No respiratory distress. Breath sounds: Examination of the left-middle field reveals decreased breath sounds. Examination of the left-lower field reveals decreased breath sounds. Decreased breath sounds, wheezing (Mild and diffuse) and rales (Scattered) present. Chest:      Chest wall: No tenderness. Abdominal:      General: Bowel sounds are normal. There is no distension. Palpations: Abdomen is soft. There is no mass. Tenderness: There is no abdominal tenderness. There is no guarding or rebound. Hernia: No hernia is present. Musculoskeletal:         General: No tenderness or deformity. Cervical back: Normal range of motion and neck supple. Comments: Range of motion within normal limits x4 extremities   Skin:     General: Skin is warm. Coloration: Skin is not pale. Findings: No erythema or rash.    Neurological:      Mental Status: She is alert and oriented to person, place, and time. Cranial Nerves: No cranial nerve deficit. Coordination: Coordination normal.   Psychiatric:         Behavior: Behavior normal.         Thought Content: Thought content normal.         BMP:   Recent Labs     06/05/21  0304 06/06/21  0305    139   K 4.1 4.5    103   CO2 23 26   BUN 5* 8   CREATININE 0.5 0.4*   GLUCOSE 99 115*   CALCIUM 8.3* 8.7     Recent Labs     06/06/21  0305 06/05/21  0304 06/04/21  1616   WBC 12.8* 14.8* 10.9*   HGB 9.6* 10.1* 10.4*   HCT 31.4* 32.9* 33.5*   MCV 97.2 98.2 97.7   * 527* 562*     CMP:    Recent Labs     06/05/21  0304 06/06/21  0305    139   K 4.1 4.5    103   CO2 23 26   BUN 5* 8   CREATININE 0.5 0.4*   GFRAA >59 >59   LABGLOM >60 >60   GLUCOSE 99 115*   PROT 6.1* 6.1*   LABALBU 2.7* 2.8*   CALCIUM 8.3* 8.7   BILITOT <0.2 <0.2   ALKPHOS 134* 120*   AST 15 16   ALT 7 8       30Day lookback of cultures:    Blood Culture Recent:   Recent Labs     06/04/21 2021   BC No Growth to date. Any change in status will be called. Gram Stain Recent:   Recent Labs     05/23/21  1430   LABGRAM Moderate WBC's (Polymorphonuclear)  No organisms seen       Resp Culture Recent: No results for input(s): CULTRESP in the last 720 hours. Body Fluid Recent : No results for input(s): BFCX in the last 720 hours. MRSA Recent : No results for input(s): [facility] in the last 720 hours. Urine Culture Recent : No results for input(s): LABURIN in the last 720 hours. Organism Recent : No results for input(s): ORG in the last 720 hours. Radiology:   XR CHEST (2 VW)    Result Date: 5/17/2021  EXAMINATION: XR CHEST (2 VW) 5/17/2021 1:21 PM HISTORY: Chest congestion COMPARISON: 10/30/2018, 10/13/2018 FINDINGS: There are airspace opacities throughout the left lung with more focal consolidation left lung base. A small layering left pleural effusion is also evident. The right lung remains clear.  The heart and mediastinal contours are normal. Pulmonary vasculature appears grossly normal. There has been prior fusion of the lower cervical spine. Multifocal pneumonia on the left with probable reactive pleural effusion. Follow-up PA and lateral chest radiograph is recommended in 6-8 weeks to document resolution. Signed by Dr Kavon Garcia on 5/17/2021 1:23 PM    XR BONE SURVEY COMPLETE    Result Date: 5/27/2021  EXAMINATION: XR BONE SURVEY COMPLETE 5/27/2021 5:40 PM HISTORY: Lytic lesions concerning for myeloma. Report: A total of 21 x-rays of the skeleton were obtained. COMPARISON: Portable chest x-ray 5/25/2021, CT of the head 5/26/2021, CT of the chest 5/24/2021. AP and lateral views of the skull and straight 2 small focal well-circumscribed lytic lesions, one in the left frontal skull and one in the posterior left parietal skull as demonstrated on recent head CT. The larger lesion is in the left frontal skull. The facial bones are unremarkable, the patient's is edentulous. There is evidence of previous ACDF. Vertebral body alignment is normal. There are no definite lytic lesions within the axial skeleton. No compression fractures are seen in the axial skeleton. The included chest x-ray shows no definite focal rib lesions. There is diffuse pleural thickening in the left hemithorax, with volume loss and reticular interstitial infiltrates. An AP view the pelvis shows no definite lytic or blastic osseous lesions. There is mild arthritic change involving both hips. Radiographs of the upper lower extremities are unremarkable. The hands and feet are not included in the osseous survey. There are 2 small focal well-circumscribed lytic lesions in the skull on the left that may represent lesions associated with multiple myeloma or metastatic disease, no definite additional osseous lesions are identified. Signed by Dr Basia South.  Jayesh on 5/27/2021 5:48 PM    CT HEAD WO CONTRAST    Result Date: 5/26/2021  EXAMINATION: CT HEAD WO CONTRAST 5/26/2021 3:24 PM HISTORY: Altered mental status, difficulty with  strength. History of SLE. DOSE: 757 mGycm. Automatic exposure control was utilized in an effort to use as little radiation as possible, without compromising image quality. REPORT: Spiral CT of the head was performed without contrast, reconstructed coronal and sagittal images are also reviewed. COMPARISON: CT head without contrast 2/5/2021. No intracranial hemorrhage, mass or mass effect is identified. The ventricles and basal cisterns are normal. There is mildly decreased attenuation in the periventricular white matter tracts suggestive of mild chronic small vessel white matter ischemic changes. Review of bone windows demonstrates no fractures. There are 2 new well-circumscribed lytic lesions within the skull, one in the left frontal bone and the other in the left parietal bone. There is normal aeration of the visualized paranasal sinuses and mastoid air cells. 1. No acute intracranial abnormality is identified. Mild chronic small vessel white matter ischemic changes are identified. 2. There are 2 new small well-circumscribed lytic lesions within the skull on the left compared with the CT from February, 2021, concerning for multiple myeloma, though with the history of SLE, lupus associated myelofibrosis is in the differential. Clinical correlation is recommended. Signed by Dr Ketan Gibbs. Jayesh on 5/26/2021 3:33 PM    CT CHEST WO CONTRAST    Result Date: 5/24/2021  Examination. CT CHEST WO CONTRAST 5/24/2021 4:40 AM History: Follow-up after chest tube placement. DLP: 881 mGycm. A CT scan of the chest is performed without intravenous contrast enhancement. The images are acquired in axial plane with subsequent reconstruction in coronal and sagittal planes. The comparison is made with the previous study dated 5/23/2021. There is interval bone graft chest tube placement. There is significant improvement in the left-sided pleural effusion.  Small residual pleural effusion is seen in the left lower chest. There is also small loculated fluid in the left lower chest posteriorly including the left major fissure. There is nodular thickening of the pleura more pronounced medially and posteriorly in the left chest no significant change since the previous study. A nodular infiltrate in the left lower lobe, predominantly in the superior segment of the left lower lobe persists. The remaining lungs are well-expanded. There is a persistent mediastinal lymphadenopathy. No change. The hilar lymph nodes are not evaluated due to lack of contrast enhancement. There is moderate lobulated fullness of the left hilum which is similar to the previous study. The possibility of left hilar lymphadenopathy is not excluded. Atheromatous changes of the thoracic aorta are seen. No aneurysmal dilatation. There is moderate diffuse dilatation of the main pulmonary artery and branches. The limited visualized coronary arteries are unremarkable without significant atheromatous changes. The unenhanced liver and spleen appear unremarkable. The gallbladder is moderately distended with a layer of high density/sludge. No calculi. There are degenerative glands are normal. A nonobstructing calculus in the right kidney is reidentified. Moderately prominent celiac lymph nodes are similar to the previous study. A focal soft tissue thickening in the left upper abdomen laterally may represent extension of the pleural thickening/processes? . The images reviewed in bone window show no acute bony abnormality or a focal bony lesion. The hardware fusion of cervical spine is incompletely visualized. The interval significant decrease in the left-sided pleural effusion as detailed above. Multifocal nodular thickening of the pleura is similar to the previous study and may represent a neoplastic process. Evidence of mediastinal lymphadenopathy. Question left hilar lymphadenopathy.  Proximal abdominal/celiac lymphadenopathy. Persistent nodular infiltrate in the left lower lobe superior segment. A nonobstructing right renal calculus. Signed by Dr Rocky Sanchez on 5/24/2021 7:35 AM    CT GUIDED NEEDLE PLACEMENT    Result Date: 5/27/2021  CT GUIDED NEEDLE PLACEMENT 5/27/2021 12:57 PM INDICATION: 60-year-old with LEFT masslike pleural thickening presenting for CT-guided biopsy. CONSENT: An informed written consent was obtained from the patient's relative. COMPLICATIONS: None. DLP: 1100 a mGy cm. In order to have a CT radiation dose as low as reasonably achievable, Automated Exposure Control was utilized for adjustment of the mA and/or KV according to patient size. MEDICATIONS: None. CONTRAST: None. ESTIMATED BLOOD LOSS: Less than 5 ml. PROCEDURE: Prior to sterile preparation and local anesthetic, noninfused axial CT scans were obtained. The optimal site for biopsy was marked. A 17-gauge coaxial needle was introduced into the mass. Multiple core biopsy samples were obtained using an 18-gauge cutting needle. The pathologist was present at the time of the procedure and determined that adequate tissue samples were obtained. No immediate complications. Successful CT guided biopsy of LEFT pleural masslike thickening as discussed above. Signed by Dr Kya Momin on 5/27/2021 3:37 PM    XR CHEST PORTABLE    Result Date: 6/4/2021  XR CHEST PORTABLE 6/4/2021 3:13 PM HISTORY:   Shortness of breath and chest pain Single view. COMPARISONS:  5/21/2021 5/23/2021 chest radiography and chest CT imaging 5/24/2021 FINDINGS: Left-sided chest tube is no longer present. Left-sided pleural reaction appreciated again appreciated with diffuse interstitial prominence and airspace consolidation in the right mid to lower lung zone region. Acute infectious/inflammatory process/pneumonia considered. The right lung is essentially stable and grossly clear with a small right pleural effusion likely.  The heart is generous without evidence of overt heart failure. Change from anterior cervical fusion with anterior plating noted. 1. Left-sided pleural reaction with left-sided interstitial and airspace opacities with mid to lower lung consolidation. Differential considerations include infectious/inflammatory change, pneumonia. 2. Cardiomegaly. Signed by Dr Jace Zhou on 6/4/2021 3:47 PM    XR CHEST PORTABLE    Result Date: 5/25/2021  Exam: XR CHEST PORTABLE - 5/25/2021 8:46 AM Indication: Assess pleural effusion prior to chest tube removal Comparison: 5/23/2021 Findings: Stable position of left-sided pleural drain. No change in trace residual LEFT pleural effusion and LEFT lower lobe atelectasis. No right-sided pleural effusion. No visible pneumothorax. Cardiac silhouette is stable. Partially imaged cervical spine fusion hardware. Impression: No significant change in trace LEFT pleural effusion and LEFT lower lobe atelectasis. Signed by Dr Farzad Moreno on 5/25/2021 11:18 AM    XR CHEST PORTABLE    Result Date: 5/23/2021  EXAM: XR CHEST PORTABLE -- 5/23/2021 1:11 PM HISTORY: 55 years, Female, status post chest tube placement COMPARISON: 5/23/2021 TECHNIQUE:  1 images. Frontal view of the chest. FINDINGS:  No pneumothorax. Interval placement left small bore chest tube. Decreased left pleural fluid. Mild diffuse hazy opacity of the left lung persists. Right lung clear. Cardiac and mediastinal silhouette within normal limits. Fixation hardware at the cervical spine. No acute bony finding. 1. Interval placement small bore left chest tube. Decreased left pleural effusion. Improved aeration of the left lung, which has persistent mild diffuse hazy opacity. Signed by Dr Arthur Ingram on 5/23/2021 2:27 PM    XR CHEST PORTABLE    Result Date: 5/23/2021  EXAM: XR CHEST PORTABLE -- 5/23/2021 6:10 AM HISTORY: 55 years, Female, shortness of air COMPARISON: 5/17/2021 TECHNIQUE:  1 images. Frontal view of the chest. FINDINGS:  No pneumothorax. Right lung clear. Increased left basilar opacity and effusion with patchy upper lung opacities. Cardiac silhouette obscured by pulmonary opacity. Upper mediastinum within normal limits. Lower cervical spine fixation hardware. No acute bony finding. 1. Increased left basilar opacity and effusion compared to 5/17/2021. Similar patchy left upper lung opacities. Signed by Dr Mannie Bhandari on 5/23/2021 7:20 AM    CTA PULMONARY W CONTRAST    Result Date: 6/4/2021  EXAMINATION:  CTA PULMONARY W CONTRAST  6/4/2021 6:40 PM HISTORY: Chest pain. Shortness of breath. COMPARISON: 5/24/2021. DLP: 614 mGy-cm. Automated exposure control was utilized. TECHNIQUE: Spiral CT angiography was performed of the chest with contrast. Sagittal, coronal and 3-D images were reconstructed. MEDIASTINUM/VASCULAR: The thoracic aorta is normal caliber. There is no CT evidence of pulmonary embolus. Heart size is upper limits of normal. There is mediastinal and left hilar lymphadenopathy. There is subcarinal lymphadenopathy. LUNGS: The lungs are clear with no focal infiltrate or effusion. UPPER ABDOMEN: There is fatty infiltration of the visualized liver. BONES/SOFT TISSUES/: There are degenerative changes of the spine. There is loculated pleural effusion on the left side. There is nodularity along the pleural surfaces consistent with metastatic pleural disease. There is diffuse infiltrate throughout the lingula, left lower lobe and to a lesser extent in the left upper lobe. There is mild dependent atelectasis in the right lung. There is a small right pleural effusion. 1. No CT evidence of pulmonary embolus. 2. Mediastinal and left hilar lymphadenopathy. Subcarinal lymphadenopathy. The adenopathy may have progressed some compared to the prior study. 3. Metastatic disease involving the pleura on the left side. There are loculated fluid collections on the left side that are likely related to the malignant pleural disease. Infection/empyema is not fully ruled out. 4. Diffuse infiltrate in the left lung may be infectious or inflammatory. Post radiation pneumonitis is possible. Interstitial spread of tumor is also in the differential. 5. Minimal right pleural effusion. Mild dependent atelectasis on the right. 6. Fatty infiltration of the liver. Signed by Dr Ivelisse Rey on 6/4/2021 6:47 PM    CTA PULMONARY W CONTRAST    Result Date: 5/23/2021  EXAM: CTA PULMONARY W CONTRAST -- 5/23/2021 7:18 AM HISTORY: 55 years, Female, large effusion, low oxygen saturation COMPARISON: 10/30/2018, chest radiograph 5/23/2021 DLP: 415 mGy cm. Automated exposure control was utilized to minimize patient radiation dose. TECHNIQUE: Enhanced axial CT images obtained. 3-D postprocessing, including MIPS, performed and images saved to PACS. FINDINGS: AIRWAYS/PULMONARY PARENCHYMA: The central airways are midline and patent. Emphysematous changes. Right lung clear. No right pleural effusion. Left lung with compressive atelectasis at the lung base. Patchy consolidative and groundglass opacities with left hilar adenopathy and pleural nodularity. Large left pleural effusion. VASCULATURE: Contrast bolus is adequate. There are no pulmonary arterial intraluminal filling defects. Normal pulmonary artery caliber. Thoracic aorta is normal in course and caliber. CARDIAC:  Cardiac size is normal.  No pericardial effusion. MEDIASTINUM: Left hilar, AP window and left paratracheal lymphadenopathy. For instance, left paratracheal lymph node measures up to 1.3 cm in short axis on axial series 2, image 46. Esophagus has normal coarse, caliber and wall thickness. EXTRATHORACIC: The visualized portions of the thyroid gland are unremarkable. No thoracic inlet or axillary adenopathy. The extrathoracic soft tissues are within normal limits. INCLUDED UPPER ABDOMEN: Focal fatty infiltration of the liver adjacent to the falciform ligament. Arterial phase of imaging limits evaluation.  The gallbladder, pancreas, spleen, adrenal glands appear within normal limits. Left retroperitoneal lymph node measures up to 1.2 cm in short axis on axial series 2, image 124. Nonobstructing right renal calculi. No hydronephrosis. OSSEOUS: No acute or suspicious bony finding. 1. Large left pleural effusion with pleural nodularity. Patchy left lung consolidative and groundglass opacities with left lower lobe atelectasis. Left hilar and mediastinal adenopathy. Overall, appearance is most concerning for malignancy. Malignancy with superimposed infection would also be a consideration. 2. Left retroperitoneal lymphadenopathy. 3. Nonobstructing right renal calculi. Signed by Dr Arthur Ingram on 5/23/2021 9:03 AM    MRI BRAIN W WO CONTRAST    Result Date: 5/27/2021  Exam: MRI BRAIN W WO CONTRAST - 5/27/2021 9:59 AM Indication: Altered mental status, lytic skull lesion Comparison: None available. Findings: No diffusion restriction to suggest acute infarction. Some vague diffusion restriction in the region of the wilma is likely related to artifact given lack of correlate on other sequences. No increased susceptibility to suggest hemorrhage. Bilateral symmetric occipital increased subarachnoid FLAIR signal is likely related to oxygen therapy. Enhancing 1.4 cm LEFT frontal skull lesion, correlating with lytic lesion identified on prior CT. There is also a 7 mm focus of abnormal enhancement in the LEFT parietal calvarium on coronal image 27 and series 901. A few scattered T2 FLAIR periventricular and subcortical white matter lesions are likely related to chronic microvascular ischemia. Brain parenchyma otherwise demonstrates normal signal intensity. No midline shift or mass effect. Lateral ventricles are nondilated. Basilar cisterns are patent. Sella turcica and its contents are unremarkable. No abnormal intracranial enhancement. No acute orbital finding. Trace LEFT mastoid effusion. Paranasal sinuses are clear. Impression: 1. No acute intracranial findings. 2.  No evidence of intracranial/parenchymal metastatic disease. 3.  Enhancing skull lesions, suspicious for osseous metastasis.  Signed by Dr Lennox Brier on 5/27/2021 4:11 PM      Medications  Current Facility-Administered Medications   Medication Dose Route Frequency Provider Last Rate Last Admin    vancomycin (VANCOCIN) 1000 mg in dextrose 5% 250 mL IVPB  1,000 mg Intravenous Q12H Patsy King MD        polyethylene glycol (GLYCOLAX) packet 17 g  17 g Oral Daily PRN Patsy King MD        ALPRAZolam Therese Vincent) tablet 0.25 mg  0.25 mg Oral TID PRN Alfredo Ferraro MD   0.25 mg at 06/06/21 0844    albumin human 25 % IV solution 25 g  25 g Intravenous Once Patsy King MD        0.9 % sodium chloride bolus  500 mL Intravenous Once Patsy King MD        midodrine (PROAMATINE) tablet 10 mg  10 mg Oral TID WC Patsy King MD   10 mg at 06/06/21 0853    rOPINIRole (REQUIP) tablet 0.25 mg  0.25 mg Oral Nightly Patsy King MD   0.25 mg at 06/05/21 2036    topiramate (TOPAMAX) tablet 50 mg  50 mg Oral BID Patsy King MD   50 mg at 06/06/21 0853    citalopram (CELEXA) tablet 40 mg  40 mg Oral Daily Patsy King MD   40 mg at 06/06/21 0853    busPIRone (BUSPAR) tablet 5 mg  5 mg Oral BID Patsy King MD   5 mg at 06/06/21 0852    montelukast (SINGULAIR) tablet 10 mg  10 mg Oral Nightly Patsy King MD   10 mg at 06/05/21 2036    gabapentin (NEURONTIN) capsule 300 mg  300 mg Oral TID Patsy King MD   300 mg at 06/06/21 0853    docusate sodium (COLACE) capsule 100 mg  100 mg Oral Daily Patsy King MD   100 mg at 06/06/21 2188    thiamine mononitrate tablet 100 mg  100 mg Oral Daily Patsy King MD   100 mg at 06/06/21 0853    tiZANidine (ZANAFLEX) tablet 2 mg  2 mg Oral TID Patsy King MD   2 mg at 06/06/21 0853    clonazePAM (KLONOPIN) tablet 0.5 mg  0.5 mg 500 mg Intravenous Q24H Patricia Best MD   Stopped at 06/06/21 0102    methylPREDNISolone sodium (SOLU-MEDROL) injection 40 mg  40 mg Intravenous Q8H Patricia Best MD   40 mg at 06/05/21 2258    morphine injection 2 mg  2 mg Intravenous Q3H PRN Patricia Best MD   2 mg at 06/06/21 0844    ipratropium (ATROVENT) 0.02 % nebulizer solution 0.5 mg  0.5 mg Nebulization Q4H WA Patricia Best MD   0.5 mg at 06/06/21 4123    budesonide (PULMICORT) nebulizer suspension 500 mcg  0.5 mg Nebulization BID Patricia Best MD   500 mcg at 06/06/21 3047    vancomycin (VANCOCIN) intermittent dosing (placeholder)   Other RX Placeholder Patricia Best MD           Allergies  Ventolin [albuterol]        IMPRESSION/RECOMMENDATIONS:    Bright Clarke is a 56-year old  female with a diagnosis of 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      TUMOR HISTORY: Stage IV metastatic poorly differentiated adenocarcinoma of the lung 5/27/2021  Bright Clarke was seen in initial oncology consultation by Dr. Jennifer Amador on 5/27/2021 as an inpatient at [facility] with a large left pleural effusion with left lung consolidation, hilar/mediastinal adenopathy, concerning for malignancy. She was also seen by pulmonologist,  .      A transthoracic needle biopsy of a left parietal pleural-based mass was performed on 5/27/2021. Pathology documented metastatic poorly differentiated adenocarcinoma.     Bright Clarke was discharged to be followed up in outpatient setting to complete the metastatic work-up and for further decision-making.   She returned due to increasing symptoms for evaluation on 6/4/2021.     CTA of the chest on 6/4/2021 compared to CT scan from 5/24/2021 documented the following:  · No evidence of pulmonary embolus  · Mediastinal, left hilar, subcarinal lymphadenopathy with progression compared to and nurses assessments. I have reviewed the above documentation completed by Rosemary JEAN-BAPTISTE   Please see my additional addended and/or modified contributions to the history of present illness, physical examination, and assessment/medical decision-making and plan that reflects my findings and impressions. I discussed essential elements of the care plan with Cynthia JEAN-BAPTISTE and the patient. I have encouraged and answered all the questions raised to the patient's understanding and satisfaction. I concur with the above stated. Subjective-awake alert sitting on side of the bed talking to her mother who lives in [place], by phone. Objective- examination stable with decreased breath sounds at the left base and midlung field. She is in no acute distress currently stable vital signs    Assessment/plan:  Pain relatively well controlled. No exaggerated dyspnea or respiratory distress or problems in that regard. She again confirms her desire for no further intervention or diagnostic work-up. She wants to go home with hospice    I was able also to converse with her mother briefly by phone explaining extent of disease. Sugey Dukes continues to desire to be discharged with hospice today. She is scheduled to speak with hospice today in hospital prior to discharge.        Electronically signed by Sandra Jasso MD on 6/6/21 at 12:23 PM CDT

## 2021-06-06 NOTE — PLAN OF CARE
Problem: Falls - Risk of:  Goal: Will remain free from falls  Description: Will remain free from falls  Outcome: Ongoing  Goal: Absence of physical injury  Description: Absence of physical injury  Outcome: Ongoing     Problem: Pain:  Goal: Pain level will decrease  Description: Pain level will decrease  Outcome: Ongoing  Goal: Control of acute pain  Description: Control of acute pain  Outcome: Ongoing  Goal: Control of chronic pain  Description: Control of chronic pain  Outcome: Ongoing     Problem: Safety:  Goal: Free from accidental physical injury  Description: Free from accidental physical injury  Outcome: Ongoing  Goal: Free from intentional harm  Description: Free from intentional harm  Outcome: Ongoing

## 2021-06-06 NOTE — DISCHARGE SUMMARY
Discharge Summary [facility] WITH HOSPICE      Corina Habermann  :  1965  MRN:  [id number]    Admit date:  2021  Discharge date:      Admitting Physician:  Thee Johnson MD    Advance Directive: [facility]    Consults: hematology/oncology and cardiothoracic surgery    Primary Care Physician:  Graham Gomez, JERILYN    Discharge Diagnoses:  Principal Problem:    Adenocarcinoma of left lung, stage 4 ([address]  Active Problems:    Malignant pleural effusion    Depression    Cervical radiculopathy    Hypoesthesia of skin    Cigarette nicotine dependence with nicotine-induced disorder    Palliative care patient    Metastatic lung cancer (metastasis from lung to other site) [facility])    Pain syndrome, chronic    Anorexia    Tobacco abuse    COPD (chronic obstructive pulmonary disease) (HCC)    Hypoxemia    Dyspnea    Loculated pleural effusion  Resolved Problems:    * No resolved hospital problems. *      Significant Diagnostic Studies:   XR CHEST PORTABLE    Result Date: 2021  XR CHEST PORTABLE 2021 3:13 PM HISTORY:   Shortness of breath and chest pain Single view. COMPARISONS:  2021 chest radiography and chest CT imaging 2021 FINDINGS: Left-sided chest tube is no longer present. Left-sided pleural reaction appreciated again appreciated with diffuse interstitial prominence and airspace consolidation in the right mid to lower lung zone region. Acute infectious/inflammatory process/pneumonia considered. The right lung is essentially stable and grossly clear with a small right pleural effusion likely. The heart is generous without evidence of overt heart failure. Change from anterior cervical fusion with anterior plating noted. 1. Left-sided pleural reaction with left-sided interstitial and airspace opacities with mid to lower lung consolidation. Differential considerations include infectious/inflammatory change, pneumonia. 2. Cardiomegaly.  Signed by Dr Aldo Meyer on 2021 3:47 PM    CTA PULMONARY W CONTRAST    Result Date: 6/4/2021  EXAMINATION:  CTA PULMONARY W CONTRAST  6/4/2021 6:40 PM HISTORY: Chest pain. Shortness of breath. COMPARISON: 5/24/2021. DLP: 614 mGy-cm. Automated exposure control was utilized. TECHNIQUE: Spiral CT angiography was performed of the chest with contrast. Sagittal, coronal and 3-D images were reconstructed. MEDIASTINUM/VASCULAR: The thoracic aorta is normal caliber. There is no CT evidence of pulmonary embolus. Heart size is upper limits of normal. There is mediastinal and left hilar lymphadenopathy. There is subcarinal lymphadenopathy. LUNGS: The lungs are clear with no focal infiltrate or effusion. UPPER ABDOMEN: There is fatty infiltration of the visualized liver. BONES/SOFT TISSUES/: There are degenerative changes of the spine. There is loculated pleural effusion on the left side. There is nodularity along the pleural surfaces consistent with metastatic pleural disease. There is diffuse infiltrate throughout the lingula, left lower lobe and to a lesser extent in the left upper lobe. There is mild dependent atelectasis in the right lung. There is a small right pleural effusion. 1. No CT evidence of pulmonary embolus. 2. Mediastinal and left hilar lymphadenopathy. Subcarinal lymphadenopathy. The adenopathy may have progressed some compared to the prior study. 3. Metastatic disease involving the pleura on the left side. There are loculated fluid collections on the left side that are likely related to the malignant pleural disease. Infection/empyema is not fully ruled out. 4. Diffuse infiltrate in the left lung may be infectious or inflammatory. Post radiation pneumonitis is possible. Interstitial spread of tumor is also in the differential. 5. Minimal right pleural effusion. Mild dependent atelectasis on the right. 6. Fatty infiltration of the liver.  Signed by Dr Raymond Zhao on 6/4/2021 6:47 PM      Pertinent Labs:   CBC:   Recent Labs     06/04/21  1616 06/05/21  0304 06/06/21  0305   WBC 10.9* 14.8* 12.8*   HGB 10.4* 10.1* 9.6*   * 527* 603*     BMP:    Recent Labs     06/04/21  1616 06/05/21  0304 06/06/21  0305   * 136 139   K 4.6 4.1 4.5   CL 99 100 103   CO2 26 23 26   BUN 5* 5* 8   CREATININE 0.5 0.5 0.4*   GLUCOSE 86 99 115*     INR:   Recent Labs     06/05/21  0304   INR 1.20*     ABGs:No results for input(s): PH, PO2, PCO2, HCO3, BE, O2SAT in the last 72 hours. Lactic Acid:  Recent Labs     06/05/21 0304   LACTA 1.2       Procedures: None  Hospital Course: 60-year-old obese female with a past medical history of lupus, anxiety, migraines recently admitted (discharged 5/28/21) for dyspnea found to have large left-sided pleural effusion with pleural nodularity concerning for a malignancy with chest tube placed with drainage of left pleural fluid.  Patient also status post CT-guided biopsy of left pleural masslike thickening performed 5/27/2021.  Oncology on board and pathology now showing metastatic poorly differentiated adenocarcinoma.  Patient with lytic lesions on CT head with some intermittent confusion; EEG showing findings of encephalopathy and MRI brain showing enhancing skull lesion suspicious for osseous metastasis. Patient wishes to be DNR/DNI after goals of care discussion. She was sent home with 4L of home oxygen at that time.   Patient presented to her PCPs office for follow-up and she was noted to be hypotensive, more dyspneic and in pain; she was subsequently sent to the emergency department for further evaluation. She was found to have a loculated left sided pleural effusion with associated worsening dyspnea and was admitted for further management. CT surgery, oncology have been consulted. Patient has decided to transition to hospice. Hospice was consulted and home hospice has been set up for the patient. Hospital bed ordered for her. Home oxygen in place. Patient to be discharged home to hospice today.     Physical Exam:  Vitals: BP 106/68   Pulse 85   Temp 96.7 °F (35.9 °C) (Temporal)   Resp 18   Ht 5' 3\" (1.6 m)   Wt 171 lb (77.6 kg)   SpO2 90%   BMI 30.29 kg/m²   24HR INTAKE/OUTPUT:      Intake/Output Summary (Last 24 hours) at 6/6/2021 1351  Last data filed at 6/6/2021 1241  Gross per 24 hour   Intake 2218 ml   Output 900 ml   Net 1318 ml     General appearance: alert and cooperative with exam, appears older than stated age  HEENT: AT/NC  Lungs: BLAE, +crackles, diminished air entry left base. Mild wheezing appreciated  Heart: RRR, no murmurs appreciated  Abdomen: BS+, soft, NT, ND  Extremities: no edema, pulses+  Neurologic: Alert, gross motor function intact  Skin: warm    Discharge Medications:       Chris Phillip   Dixon Medication Instructions PID:[id number]    Printed on:06/06/21 1351   Medication Information                      ALPRAZolam (XANAX) 0.25 MG tablet  Take 1 tablet by mouth 3 times daily as needed for Anxiety for up to 30 days. amitriptyline (ELAVIL) 100 MG tablet  Take 100 mg by mouth nightly             busPIRone (BUSPAR) 10 MG tablet  Take 1 tablet by mouth twice daily             citalopram (CELEXA) 40 MG tablet  Take 1 tablet by mouth once daily             clonazePAM (KLONOPIN) 0.5 MG tablet  Take 0.5 mg by mouth 2 times daily as needed. diclofenac sodium 1 % GEL  Apply 2 g topically 4 times daily             docusate sodium (COLACE, DULCOLAX) 100 MG CAPS  Take 100 mg by mouth daily             fluticasone (FLONASE) 50 MCG/ACT nasal spray  USE 1 SPRAY(S) IN EACH NOSTRIL ONCE DAILY             gabapentin (NEURONTIN) 300 MG capsule  Take 1 capsule by mouth 3 times daily for 30 days.              Galcanezumab-gnlm (EMGALITY) 120 MG/ML SOAJ  1 injection every 30 days             HYDROcodone-acetaminophen (NORCO) 7.5-325 MG per tablet  TAKE 1 TABLET BY MOUTH THREE TIMES DAILY MUST LAST 30 DAYS             ipratropium (ATROVENT HFA) 17 MCG/ACT inhaler  Inhale 1 puff into the lungs 3 times daily             melatonin 5 MG TBDP disintegrating tablet  Take 1 tablet by mouth nightly             montelukast (SINGULAIR) 10 MG tablet  Take 1 tablet by mouth nightly             NARCAN 4 MG/0.1ML LIQD nasal spray  ADMINISTER A SINGLE SPRAY IN ONE NOSTRIL UPON SIGNS OF OPIOID OVERDOSE. CALL 911. REPEAT AFTER 3 MINUTES IF NO RESPONSE.             omeprazole (PRILOSEC) 40 MG delayed release capsule  TAKE ONE CAPSULE BY MOUTH ONCE DAILY             ondansetron (ZOFRAN ODT) 4 MG disintegrating tablet  Take 1 tablet by mouth every 8 hours as needed for Nausea or Vomiting             oxyCODONE (ROXICODONE) 5 MG immediate release tablet  Take 1 tablet by mouth every 6 hours as needed for Pain for up to 3 days. Intended supply: 3 days. Take lowest dose possible to manage pain             OXYGEN  Inhale into the lungs             oxymetazoline (AFRIN) 0.05 % nasal spray  2 sprays by Nasal route 2 times daily             polyethylene glycol (GLYCOLAX) 17 g packet  Take 17 g by mouth daily as needed for Constipation             propranolol (INDERAL) 10 MG tablet  Take 1 tablet by mouth 2 times daily             rOPINIRole (REQUIP) 0.25 MG tablet  Take 1 tablet by mouth nightly             simvastatin (ZOCOR) 10 MG tablet  Take 1 tablet by mouth nightly             sucralfate (CARAFATE) 1 GM tablet  Take 1 tablet by mouth twice daily             SUMAtriptan (IMITREX) 25 MG tablet  Take 1 tablet prn migraine             tiZANidine (ZANAFLEX) 2 MG tablet  Take 1 tablet by mouth 3 times daily             topiramate (TOPAMAX) 50 MG tablet  TAKE 1 TABLET BY MOUTH TWICE DAILY             vitamin B-1 (THIAMINE) 100 MG tablet  Take 1 tablet by mouth daily             zolpidem (AMBIEN) 5 MG tablet  Take 1 tablet by mouth nightly as needed for Sleep for up to 14 days. Discharge Instructions: Follow up with Elspeth Felty, APRN in 7 days. Take medications as directed. Resume activity as tolerated.     Diet: ADULT DIET; Easy to Chew     Disposition: Patient will be discharged [facility]. Time spent on discharge 35 minutes.     Signed:  Samantha Conway MD 6/6/2021 1:51 PM

## 2021-06-06 NOTE — PROGRESS NOTES
Pharmacy Vancomycin Consult     Vancomycin Day: 3  Current Dosing: Vancomycin 1250 mg IV every 12 hours    Temp max:  96.7    Recent Labs     06/05/21  0304 06/06/21  0305   BUN 5* 8       Recent Labs     06/05/21  0304 06/06/21  0305   CREATININE 0.5 0.4*       Recent Labs     06/05/21  0304 06/06/21  0305   WBC 14.8* 12.8*         Intake/Output Summary (Last 24 hours) at 6/6/2021 0904  Last data filed at 6/6/2021 0233  Gross per 24 hour   Intake 1309 ml   Output 650 ml   Net 659 ml       Culture Date Source Results   06/04/21 Blood x 2 No growth       Ht Readings from Last 1 Encounters:   06/04/21 5' 3\" (1.6 m)        Wt Readings from Last 1 Encounters:   06/06/21 171 lb (77.6 kg)         Body mass index is 30.29 kg/m². Estimated Creatinine Clearance: 159 mL/min (A) (based on SCr of 0.4 mg/dL (L)). Trough: 18.2    Assessment/Plan: Decrease Vancomycin to 1000 mg IV every 12 hours for a predicted trough of 15.6 and predicted AUC of 498. Thank you for the consult. Will continue to follow.      Electronically signed by Abril Gandara, [facility] on 6/6/2021 at 9:04 AM

## 2021-06-06 NOTE — PLAN OF CARE
Problem: Falls - Risk of:  Goal: Will remain free from falls  Description: Will remain free from falls  6/5/2021 2331 by Will Pretty RN  Outcome: Ongoing  6/5/2021 2329 by Will Pretty RN  Outcome: Ongoing  Goal: Absence of physical injury  Description: Absence of physical injury  6/5/2021 2331 by Will Pretty RN  Outcome: Ongoing  6/5/2021 2329 by Will Pretty RN  Outcome: Ongoing     Problem: Pain:  Goal: Pain level will decrease  Description: Pain level will decrease  6/5/2021 2331 by Will Pretty RN  Outcome: Ongoing  6/5/2021 2329 by Will Pretty RN  Outcome: Ongoing  Goal: Control of acute pain  Description: Control of acute pain  6/5/2021 2331 by Will Pretty RN  Outcome: Ongoing  6/5/2021 2329 by Will Pretty RN  Outcome: Ongoing  Goal: Control of chronic pain  Description: Control of chronic pain  6/5/2021 2331 by Will Pretty RN  Outcome: Ongoing  6/5/2021 2329 by Will Pretty RN  Outcome: Ongoing     Problem: Safety:  Goal: Free from accidental physical injury  Description: Free from accidental physical injury  6/5/2021 2331 by Will Pretty RN  Outcome: Ongoing  6/5/2021 2329 by Will Pretty RN  Outcome: Ongoing  Goal: Free from intentional harm  Description: Free from intentional harm  6/5/2021 2331 by Will Pretty RN  Outcome: Ongoing  6/5/2021 2329 by Will Pretty RN  Outcome: Ongoing

## 2021-06-07 DIAGNOSIS — C34.92 ADENOCARCINOMA OF LEFT LUNG, STAGE 4 (HCC): Primary | ICD-10-CM

## 2021-06-07 LAB
EKG P AXIS: 58 DEGREES
EKG P-R INTERVAL: 184 MS
EKG Q-T INTERVAL: 364 MS
EKG QRS DURATION: 80 MS
EKG QTC CALCULATION (BAZETT): 418 MS
EKG T AXIS: 43 DEGREES

## 2021-06-07 PROCEDURE — 93010 ELECTROCARDIOGRAM REPORT: CPT | Performed by: INTERNAL MEDICINE

## 2021-06-08 ENCOUNTER — TELEPHONE (OUTPATIENT)
Dept: INTERNAL MEDICINE CLINIC | Age: 56
End: 2021-06-08

## 2021-06-08 NOTE — PROGRESS NOTES
Physician Progress Note      Kevon Timmons  ANTHONY #:                  [id number]  :                       1965  ADMIT DATE:       2021 1:28 PM  [address] [place] DATE:        2021 4:00 PM  RESPONDING  PROVIDER #:        Debora Gonsalez MD        QUERY TEXT:    Type of Encephalopathy: Please provide further specificity, if known.     Clinical indicators include: encephalopathy, bacteremia, infectious, infection  Options provided:  -- Anoxic/hypoxic encephalopathy  -- Metabolic encephalopathy  -- Toxic encephalopathy  -- Hepatic encephalopathy  -- Hypertensive encephalopathy  -- Other - I will add my own diagnosis  -- Disagree - Not applicable / Not valid  -- Disagree - Clinically Unable to determine / Unknown        PROVIDER RESPONSE TEXT:    No encephalopathy      Electronically signed by:  Debora Gonsalez MD 2021 10:36 AM

## 2021-06-08 NOTE — TELEPHONE ENCOUNTER
Hospice has referral from [address] [place] [address] sign off on the initial Hospice referral orders  ?

## 2021-06-09 ENCOUNTER — TELEPHONE (OUTPATIENT)
Dept: HEMATOLOGY | Age: 56
End: 2021-06-09

## 2021-06-09 LAB
BLOOD CULTURE, ROUTINE: NORMAL
CULTURE, BLOOD 2: NORMAL

## 2021-06-09 NOTE — TELEPHONE ENCOUNTER
Called and left message for patient regarding missed [facility] follow up appointment with Dr Jennifer Fajardo.

## 2021-06-17 ENCOUNTER — TELEPHONE (OUTPATIENT)
Dept: NEUROSURGERY | Facility: CLINIC | Age: 56
End: 2021-06-17

## 2021-06-18 ENCOUNTER — TELEPHONE (OUTPATIENT)
Dept: NEUROLOGY | Age: 56
End: 2021-06-18

## 2021-06-18 NOTE — TELEPHONE ENCOUNTER
Called the patient to see if they would like to reschedule the no show appointment with Lisette Lawler, could not reach the patient. I left a voicemail to call [phone] if they would like to reschedule.

## 2021-06-20 RX ORDER — GALCANEZUMAB 120 MG/ML
INJECTION, SOLUTION SUBCUTANEOUS
Qty: 1 ML | Refills: 0 | Status: SHIPPED | OUTPATIENT
Start: 2021-06-20

## 2021-06-22 ENCOUNTER — TELEPHONE (OUTPATIENT)
Dept: NEUROSURGERY | Facility: CLINIC | Age: 56
End: 2021-06-22

## 2021-06-22 LAB
FUNGUS (MYCOLOGY) CULTURE: NORMAL
KOH PREP: NORMAL

## 2021-06-22 NOTE — TELEPHONE ENCOUNTER
Left a vm for patient to call the office back to reschedule her missed appt with Joey Alvarado on 6/21/2021. If patient calls back make sure she has completed therapy and schedule first available. If she has not completed therapy schedule her to come back after 6 weeks and advise pt to call the facility of her choosing and set it up; she received a therapy order at check out from her last appointment in the office.  I will also mail a no show letter to address listed in her chart.

## 2022-04-29 ENCOUNTER — TELEPHONE (OUTPATIENT)
Dept: PRIMARY CARE CLINIC | Age: 57
End: 2022-04-29

## (undated) DEVICE — ENDO KIT,LOURDES HOSPITAL: Brand: MEDLINE INDUSTRIES, INC.

## (undated) DEVICE — FORCEPS BX L240CM DIA2.4MM L NDL RAD JAW 4 133340

## (undated) DEVICE — TRAP POLYP ETRAP